# Patient Record
Sex: FEMALE | Race: BLACK OR AFRICAN AMERICAN | NOT HISPANIC OR LATINO | Employment: FULL TIME | URBAN - METROPOLITAN AREA
[De-identification: names, ages, dates, MRNs, and addresses within clinical notes are randomized per-mention and may not be internally consistent; named-entity substitution may affect disease eponyms.]

---

## 2017-02-11 ENCOUNTER — HOSPITAL ENCOUNTER (EMERGENCY)
Facility: HOSPITAL | Age: 22
Discharge: HOME | End: 2017-02-11
Attending: EMERGENCY MEDICINE
Payer: MEDICAID

## 2017-02-11 VITALS
DIASTOLIC BLOOD PRESSURE: 56 MMHG | SYSTOLIC BLOOD PRESSURE: 113 MMHG | HEIGHT: 60 IN | BODY MASS INDEX: 29.64 KG/M2 | RESPIRATION RATE: 20 BRPM | OXYGEN SATURATION: 98 % | HEART RATE: 67 BPM | TEMPERATURE: 98 F | WEIGHT: 150.94 LBS

## 2017-02-11 DIAGNOSIS — J06.9 VIRAL URI WITH COUGH: Primary | ICD-10-CM

## 2017-02-11 LAB
B-HCG UR QL: NEGATIVE
CTP QC/QA: YES
FLUAV AG SPEC QL IA: NEGATIVE
FLUBV AG SPEC QL IA: NEGATIVE
SPECIMEN SOURCE: NORMAL

## 2017-02-11 PROCEDURE — 81025 URINE PREGNANCY TEST: CPT | Performed by: PHYSICIAN ASSISTANT

## 2017-02-11 PROCEDURE — 87400 INFLUENZA A/B EACH AG IA: CPT

## 2017-02-11 PROCEDURE — 25000003 PHARM REV CODE 250: Performed by: PHYSICIAN ASSISTANT

## 2017-02-11 PROCEDURE — 99283 EMERGENCY DEPT VISIT LOW MDM: CPT | Mod: 25

## 2017-02-11 RX ORDER — FLUTICASONE PROPIONATE 50 MCG
1 SPRAY, SUSPENSION (ML) NASAL 2 TIMES DAILY PRN
Qty: 15 G | Refills: 0 | Status: SHIPPED | OUTPATIENT
Start: 2017-02-11 | End: 2017-07-19

## 2017-02-11 RX ORDER — IBUPROFEN 600 MG/1
600 TABLET ORAL
Status: COMPLETED | OUTPATIENT
Start: 2017-02-11 | End: 2017-02-11

## 2017-02-11 RX ORDER — IBUPROFEN 600 MG/1
600 TABLET ORAL EVERY 6 HOURS PRN
Qty: 20 TABLET | Refills: 0 | Status: SHIPPED | OUTPATIENT
Start: 2017-02-11 | End: 2017-04-02 | Stop reason: ALTCHOICE

## 2017-02-11 RX ADMIN — IBUPROFEN 600 MG: 600 TABLET, FILM COATED ORAL at 01:02

## 2017-02-11 NOTE — ED AVS SNAPSHOT
[facility]  [place] [address]               Jay Sahux   2017  1:04 PM   ED    Description:  Female : 1995   Department:  [facility]           Your Care was Coordinated By:     Provider Role From To    Kalyan Vitale MD Attending Provider [phone] --    Jass Guzman PA-C Physician Assistant [phone] --      Reason for Visit     Sore Throat     Generalized Body Aches           Diagnoses this Visit        Comments    Viral URI with cough    -  Primary       ED Disposition     None           To Do List           Follow-up Information     Go to [facility].    Specialty:  Emergency Medicine    Why:  If symptoms worsen    Contact information:    [place] [address] [phone]        Follow up with Suzy Shanks MD. Schedule an appointment as soon as possible for a visit in 1 week.    Specialty:  Family Medicine    Why:  For follow-up care    Contact information:    [address] [place] [address]  [phone]         These Medications        Disp Refills Start End    fluticasone (FLONASE) 50 mcg/actuation nasal spray 15 g 0 2017     1 spray by Each Nare route 2 (two) times daily as needed for Rhinitis. - Each Nare    Pharmacy: [facility] [address] [facility] [address] AT [facility] Ph #: [phone]       ibuprofen (ADVIL,MOTRIN) 600 MG tablet 20 tablet 0 2017     Take 1 tablet (600 mg total) by mouth every 6 (six) hours as needed for Pain. - Oral    Pharmacy: [facility] [address] [place], [place] - [address] AT [facility] Ph #: [phone]         [facility]     [facility] Care [facility] -  Assistance  Registered nurses in the [facility] provide clinical advisement, health education, appointment booking, and other advisory  services.  Call for this free service at [phone].             Medications           Message regarding Medications     Verify the changes and/or additions to your medication regime listed below are the same as discussed with your clinician today.  If any of these changes or additions are incorrect, please notify your healthcare provider.        START taking these NEW medications        Refills    fluticasone (FLONASE) 50 mcg/actuation nasal spray 0    Si spray by Each Nare route 2 (two) times daily as needed for Rhinitis.    Class: Print    Route: Each Nare    ibuprofen (ADVIL,MOTRIN) 600 MG tablet 0    Sig: Take 1 tablet (600 mg total) by mouth every 6 (six) hours as needed for Pain.    Class: Print    Route: Oral      These medications were administered today        Dose Freq    ibuprofen tablet 600 mg 600 mg ED 1 Time    Sig: Take 1 tablet (600 mg total) by mouth ED 1 Time.    Class: Normal    Route: Oral    Cosign for Ordering: Accepted by Kalyan Vitale MD on 2017  2:02 PM      STOP taking these medications     insulin detemir (LEVEMIR FLEXTOUCH) 100 unit/mL (3 mL) SubQ InPn pen Inject 18 Units into the skin 2 (two) times daily.           Verify that the below list of medications is an accurate representation of the medications you are currently taking.  If none reported, the list may be blank. If incorrect, please contact your healthcare provider. Carry this list with you in case of emergency.           Current Medications     blood sugar diagnostic Strp 1 strip by Misc.(Non-Drug; Combo Route) route 5 (five) times daily.    fluticasone (FLONASE) 50 mcg/actuation nasal spray 1 spray by Each Nare route 2 (two) times daily as needed for Rhinitis.    GLUCAGON EMERGENCY KIT, HUMAN, 1 mg injection     ibuprofen (ADVIL,MOTRIN) 600 MG tablet Take 1 tablet (600 mg total) by mouth every 6 (six) hours as needed for Pain.    insulin lispro (HUMALOG KWIKPEN) 100 unit/mL InPn pen Sliding scale insulin as  "directed    lancets Misc Pt test 6 times daily, fast click lancets    MIRENA 20 mcg/24 hr (5 years) IUD     pen needle, diabetic (BD INSULIN PEN NEEDLE UF MINI) 31 gauge x 3/16" Ndle 1 each by Misc.(Non-Drug; Combo Route) route 3 (three) times daily.           Clinical Reference Information           Your Vitals Were     BP Pulse Temp Resp Height Weight    113/60 (BP Location: Right arm, Patient Position: Sitting) 98 98.8 °F (37.1 °C) (Oral) 17 5' (1.524 m) 68.5 kg (150 lb 15 oz)    SpO2 BMI             97% 29.48 kg/m2         Allergies as of 2/11/2017        Reactions    Clindamycin Anaphylaxis, Hives      Immunizations Administered on Date of Encounter - 2/11/2017     None      ED Micro, Lab, POCT     Start Ordered       Status Ordering Provider    02/11/17 1317 02/11/17 1316  Influenza antigen Nasopharyngeal Swab  STAT      Final result     02/11/17 1317 02/11/17 1316  POCT urine pregnancy  Once      Final result       ED Imaging Orders     None        Discharge Instructions         Viral Upper Respiratory Illness (Adult)  You have a viral upper respiratory illness (URI), which is another term for the common cold. This illness is contagious during the first few days. It is spread through the air by coughing and sneezing. It may also be spread by direct contact (touching the sick person and then touching your own eyes, nose, or mouth). Frequent handwashing will decrease risk of spread. Most viral illnesses go away within 7 to 10 days with rest and simple home remedies. Sometimes the illness may last for several weeks. Antibiotics will not kill a virus, and they are generally not prescribed for this condition.    Home care  · If symptoms are severe, rest at home for the first 2 to 3 days. When you resume activity, don't let yourself get too tired.  · Avoid being exposed to cigarette smoke (yours or others).  · You may use acetaminophen or ibuprofen to control pain and fever, unless another medicine was prescribed. " (Note: If you have chronic liver or kidney disease, have ever had a stomach ulcer or gastrointestinal bleeding, or are taking blood-thinning medicines, talk with your healthcare provider before using these medicines.) Aspirin should never be given to anyone under 18 years of age who is ill with a viral infection or fever. It may cause severe liver or brain damage.  · Your appetite may be poor, so a light diet is fine. Avoid dehydration by drinking 6 to 8 glasses of fluids per day (water, soft drinks, juices, tea, or soup). Extra fluids will help loosen secretions in the nose and lungs.  · Over-the-counter cold medicines will not shorten the length of time youre sick, but they may be helpful for the following symptoms: cough, sore throat, and nasal and sinus congestion. (Note: Do not use decongestants if you have high blood pressure.)  Follow-up care  Follow up with your healthcare provider, or as advised.  When to seek medical advice  Call your healthcare provider right away if any of these occur:  · Cough with lots of colored sputum (mucus)  · Severe headache; face, neck, or ear pain  · Difficulty swallowing due to throat pain  · Fever of 100.4°F (38°C)  Call 911, or get immediate medical care  Call emergency services right away if any of these occur:  · Chest pain, shortness of breath, wheezing, or difficulty breathing  · Coughing up blood  · Inability to swallow due to throat pain  Date Last Reviewed: 9/13/2015  © 5865-0337 [employer]. [address] [place] [address]. All rights reserved. This information is not intended as a substitute for professional medical care. Always follow your healthcare professional's instructions.           [facility] complies with applicable Federal civil rights laws and does not discriminate on the basis of race, color, national origin, age, disability, or sex.        Language Assistance Services     ATTENTION: Language assistance services  are available, free of charge. Please call [phone].      ATENCIÓN: Si habla español, tiene a gray disposición servicios gratuitos de asistencia lingüística. Llame al [phone].     CHÚ Ý: N?u b?n nói Ti?ng Vi?t, có các d?ch v? h? tr? ngôn ng? mi?n phí dành cho b?n. G?i s? [phone].

## 2017-02-11 NOTE — DISCHARGE INSTRUCTIONS
Viral Upper Respiratory Illness (Adult)  You have a viral upper respiratory illness (URI), which is another term for the common cold. This illness is contagious during the first few days. It is spread through the air by coughing and sneezing. It may also be spread by direct contact (touching the sick person and then touching your own eyes, nose, or mouth). Frequent handwashing will decrease risk of spread. Most viral illnesses go away within 7 to 10 days with rest and simple home remedies. Sometimes the illness may last for several weeks. Antibiotics will not kill a virus, and they are generally not prescribed for this condition.    Home care  · If symptoms are severe, rest at home for the first 2 to 3 days. When you resume activity, don't let yourself get too tired.  · Avoid being exposed to cigarette smoke (yours or others).  · You may use acetaminophen or ibuprofen to control pain and fever, unless another medicine was prescribed. (Note: If you have chronic liver or kidney disease, have ever had a stomach ulcer or gastrointestinal bleeding, or are taking blood-thinning medicines, talk with your healthcare provider before using these medicines.) Aspirin should never be given to anyone under 18 years of age who is ill with a viral infection or fever. It may cause severe liver or brain damage.  · Your appetite may be poor, so a light diet is fine. Avoid dehydration by drinking 6 to 8 glasses of fluids per day (water, soft drinks, juices, tea, or soup). Extra fluids will help loosen secretions in the nose and lungs.  · Over-the-counter cold medicines will not shorten the length of time youre sick, but they may be helpful for the following symptoms: cough, sore throat, and nasal and sinus congestion. (Note: Do not use decongestants if you have high blood pressure.)  Follow-up care  Follow up with your healthcare provider, or as advised.  When to seek medical advice  Call your healthcare provider right away if any  of these occur:  · Cough with lots of colored sputum (mucus)  · Severe headache; face, neck, or ear pain  · Difficulty swallowing due to throat pain  · Fever of 100.4°F (38°C)  Call 911, or get immediate medical care  Call emergency services right away if any of these occur:  · Chest pain, shortness of breath, wheezing, or difficulty breathing  · Coughing up blood  · Inability to swallow due to throat pain  Date Last Reviewed: 9/13/2015  © 6505-1109 [employer] [address] [place] [address]. All rights reserved. This information is not intended as a substitute for professional medical care. Always follow your healthcare professional's instructions.

## 2017-02-11 NOTE — ED PROVIDER NOTES
"Encounter Date: 2/11/2017    SCRIBE #1 NOTE: I, Jenelel Cevallos, am scribing for, and in the presence of,  Jass Guzman PA-C. I have scribed the following portions of the note - Other sections scribed: HPI/ROS.       History     Chief Complaint   Patient presents with    Sore Throat     Pt. woke up today with "body aches and my body feelin wierd. I have a sore throat and nose stopped up".     Generalized Body Aches     Review of patient's allergies indicates:   Allergen Reactions    Clindamycin Anaphylaxis and Hives     HPI Comments: CC: Sore Throat    HPI: This 22 yo F who has history of heart murmur, sickle cell trait, and DM presents to the ED for evaluation of acute onset mild sore throat with associated mild nasal congestion, intermittent mild cough, decreased appetite, and generalized body aches for two days. No treatment attempted. Close contact with mother who was recently diagnosed with the flu. No flu shot this season. The pt denies fever, chills, N/V/D, and any other associated symptoms.     The history is provided by the patient. No  was used.     Past Medical History   Diagnosis Date    Diabetes mellitus type I     Heart murmur     Sickle cell trait      No past medical history pertinent negatives.  Past Surgical History   Procedure Laterality Date    Cyst removed on buttox  12/2011    Pilonidal cyst drainage  2014    Dilation and curettage of uterus  05/17/13     Family History   Problem Relation Age of Onset    Diabetes Paternal Grandmother     Hypertension Paternal Grandmother     Asthma Brother     Thyroid disease Mother      Social History   Substance Use Topics    Smoking status: Never Smoker    Smokeless tobacco: Never Used    Alcohol use No     Review of Systems   Constitutional: Positive for appetite change (decreased). Negative for chills and fever.   HENT: Positive for congestion (nasal) and sore throat.    Eyes: Negative for visual disturbance. "   Respiratory: Positive for cough (mild, intermittent). Negative for shortness of breath.    Cardiovascular: Negative for chest pain.   Gastrointestinal: Negative for abdominal pain, diarrhea, nausea and vomiting.   Genitourinary: Negative for dysuria and frequency.   Musculoskeletal: Positive for myalgias.   Skin: Negative for rash.   Neurological: Negative for headaches.       Physical Exam   Initial Vitals   BP Pulse Resp Temp SpO2   02/11/17 1256 02/11/17 1256 02/11/17 1256 02/11/17 1256 02/11/17 1256   113/60 98 17 98.8 °F (37.1 °C) 97 %     Physical Exam    Vitals reviewed.  Constitutional: She appears well-developed and well-nourished. She is not diaphoretic. No distress.   HENT:   Head: Normocephalic and atraumatic.   Right Ear: External ear normal.   Left Ear: External ear normal.   Nose: Nose normal.   Mouth/Throat: Oropharynx is clear and moist. No oropharyngeal exudate.   Uvula midline, mild maxillary sinus tenderness.   Eyes: Conjunctivae are normal. No scleral icterus.   Neck: Normal range of motion. Neck supple. No JVD present.   Cardiovascular: Normal rate, regular rhythm, normal heart sounds and intact distal pulses.   Pulmonary/Chest: Breath sounds normal. No stridor. No respiratory distress. She has no wheezes. She has no rhonchi. She has no rales. She exhibits no tenderness.   Musculoskeletal: Normal range of motion. She exhibits no edema or tenderness.   Lymphadenopathy:     She has no cervical adenopathy.   Neurological: She is alert and oriented to person, place, and time.   Skin: Skin is warm and dry. No rash noted.         ED Course   Procedures  Labs Reviewed   INFLUENZA A AND B ANTIGEN   POCT URINE PREGNANCY             Medical Decision Making:   History:   Old Medical Records: I decided to obtain old medical records.    Emergent evaluation of a 21-year-old female with diabetes complaining of myalgias, sinus congestion, sore throat, cough ×1 day.  She denies dysuria, fever, shortness of  breath, and has been exposed to influenza recently.   She presents well-appearing in no distress, afebrile with normal vital signs.  She has mild sinus tenderness but otherwise HEENT exam is unremarkable.  I doubt strep pharyngitis, peritonsillar abscess, posterior pharyngeal abscess, epiglottitis.  Lungs sounds are clear with normal work of breathing.  Heart sounds are normal.  I doubt pneumonia, endocarditis, or bronchitis.  Influenza nasopharyngeal swab is negative.  Patient likely has a non-influenza viral URI with cough and will be treated supportively Flonase and ibuprofen.  Patient discharged with return precautions and advised follow-up with PCP.  Case discussed with Dr. Vitale.          Scribe Attestation:   Scribe #1: I performed the above scribed service and the documentation accurately describes the services I performed. I attest to the accuracy of the note.    Attending Attestation:           Physician Attestation for Scribe:  Physician Attestation Statement for Scribe #1: I, Jass Guzman PA-C, reviewed documentation, as scribed by Jenelle Cevallos in my presence, and it is both accurate and complete.                 ED Course     Clinical Impression:   The encounter diagnosis was Viral URI with cough.          Jass Guzman PA-C  02/11/17 3299

## 2017-02-20 ENCOUNTER — TELEPHONE (OUTPATIENT)
Dept: OBSTETRICS AND GYNECOLOGY | Facility: CLINIC | Age: 22
End: 2017-02-20

## 2017-02-20 NOTE — TELEPHONE ENCOUNTER
----- Message from Ana Cristina Daugherty sent at 2/20/2017 11:03 AM CST -----  Contact: [phone]  Pt is having problems with her birth control and wants to speak with a nurse  Please call pt at your earliest convenience.  Thanks !

## 2017-02-20 NOTE — TELEPHONE ENCOUNTER
Spoke with pt. Pt c/o of pelvic pain after having sex. Pt has IUD and is not sure if that is why she is having the pain. Pt informed that it is not unusual to have pelvic pain or cramping after sex due to IUD. Pt advised to try taking OTC tylenol or IBU and see if it helps relieve the pain. Pt advised to call our office for an evaluation if that does not help.

## 2017-03-13 PROBLEM — E78.00 HYPERCHOLESTEROLEMIA: Status: ACTIVE | Noted: 2017-03-13

## 2017-03-13 PROBLEM — E78.5 DYSLIPIDEMIA: Status: ACTIVE | Noted: 2017-03-13

## 2017-04-02 ENCOUNTER — HOSPITAL ENCOUNTER (EMERGENCY)
Facility: HOSPITAL | Age: 22
Discharge: HOME | End: 2017-04-02
Attending: EMERGENCY MEDICINE
Payer: MEDICAID

## 2017-04-02 VITALS
BODY MASS INDEX: 29.45 KG/M2 | OXYGEN SATURATION: 99 % | HEART RATE: 79 BPM | TEMPERATURE: 99 F | HEIGHT: 60 IN | DIASTOLIC BLOOD PRESSURE: 60 MMHG | WEIGHT: 150 LBS | RESPIRATION RATE: 17 BRPM | SYSTOLIC BLOOD PRESSURE: 106 MMHG

## 2017-04-02 DIAGNOSIS — M79.641 BILATERAL HAND PAIN: Primary | ICD-10-CM

## 2017-04-02 DIAGNOSIS — M79.642 BILATERAL HAND PAIN: Primary | ICD-10-CM

## 2017-04-02 DIAGNOSIS — E10.8 TYPE 1 DIABETES MELLITUS WITH COMPLICATION: ICD-10-CM

## 2017-04-02 LAB
B-HCG UR QL: NEGATIVE
CTP QC/QA: YES

## 2017-04-02 PROCEDURE — 81025 URINE PREGNANCY TEST: CPT | Performed by: NURSE PRACTITIONER

## 2017-04-02 PROCEDURE — 99283 EMERGENCY DEPT VISIT LOW MDM: CPT | Mod: 25

## 2017-04-02 PROCEDURE — 25000003 PHARM REV CODE 250: Performed by: NURSE PRACTITIONER

## 2017-04-02 RX ORDER — NAPROXEN 500 MG/1
500 TABLET ORAL 2 TIMES DAILY PRN
Qty: 10 TABLET | Refills: 0 | Status: SHIPPED | OUTPATIENT
Start: 2017-04-02 | End: 2017-04-07

## 2017-04-02 RX ORDER — NAPROXEN 500 MG/1
500 TABLET ORAL
Status: COMPLETED | OUTPATIENT
Start: 2017-04-02 | End: 2017-04-02

## 2017-04-02 RX ADMIN — NAPROXEN 500 MG: 500 TABLET ORAL at 06:04

## 2017-04-02 NOTE — ED PROVIDER NOTES
"Encounter Date: 4/2/2017    SCRIBE #1 NOTE: I, Willam Faith , am scribing for, and in the presence of,  VINCENT Fowler. I have scribed the following portions of the note - Other sections scribed: HPI, ROS .       History     Chief Complaint   Patient presents with    Hand Pain     bilat, worse in the right +stiffness, works as a      Review of patient's allergies indicates:   Allergen Reactions    Clindamycin Anaphylaxis and Hives     HPI Comments: CC: Hand Pain     HPI: This 21 y.o. female with a medical history of Diabetes mellitus type I; Heart murmur; and Sickle cell trait presents to the ED accompanied by spouse c/o intermittent bilateral palmar and dorsal hand pain that begins 1 day after work everytime. Pt reports moderate "aching" pain (5/10) that is exacerbated with opening a fist. Pt reports working as a . Pt states hand pain was not present while working as a . Pt reports history of similar hand pain that was treated with Gabapentin, with relief. Pt states recent PCP change and recent new work as  resulting in no Gabapentin treatment for hand pain history for weeks. Pt states compliance with DM treatment and CBG levels are normally around 100. Pt denies wrist pain, finger pain, elbow pain, fever, chills, nausea, vomiting, or any other associated symptoms. Pt has no modifying factors. Pt has no prior treatment.     The history is provided by the patient. No  was used.     Past Medical History:   Diagnosis Date    Diabetes mellitus type I     Heart murmur     Sickle cell trait      Past Surgical History:   Procedure Laterality Date    cyst removed on buttox  12/2011    DILATION AND CURETTAGE OF UTERUS  05/17/13    PILONIDAL CYST DRAINAGE  2014     Family History   Problem Relation Age of Onset    Diabetes Paternal Grandmother     Hypertension Paternal Grandmother     Asthma Brother     Thyroid disease Mother      Social History "   Substance Use Topics    Smoking status: Never Smoker    Smokeless tobacco: Never Used    Alcohol use No     Review of Systems   Constitutional: Negative for chills and fever.   HENT: Negative for sore throat.    Eyes: Negative for pain.   Respiratory: Negative for shortness of breath.    Cardiovascular: Negative for chest pain.   Gastrointestinal: Negative for nausea and vomiting.   Genitourinary: Negative for dysuria.   Musculoskeletal: Positive for arthralgias (bilateral palmar and dorsal hand ). Negative for back pain and neck pain.        (-) bilateral elbow   (-) bilateral wrist   (-) any finger pain    Skin: Negative for rash and wound.   Neurological: Negative for headaches.   Psychiatric/Behavioral: Negative for confusion.       Physical Exam   Initial Vitals   BP Pulse Resp Temp SpO2   04/02/17 1732 04/02/17 1732 04/02/17 1732 04/02/17 1732 04/02/17 1732   106/60 79 17 99.2 °F (37.3 °C) 99 %     Physical Exam    Nursing note and vitals reviewed.  Constitutional: She appears well-developed and well-nourished. She is not diaphoretic. She is cooperative.  Non-toxic appearance. No distress.   HENT:   Head: Normocephalic and atraumatic.   Right Ear: External ear normal.   Left Ear: External ear normal.   Eyes: Conjunctivae and EOM are normal.   Neck: Normal range of motion. No tracheal deviation present.   Cardiovascular: Normal rate, regular rhythm and intact distal pulses.   Pulses:       Radial pulses are 2+ on the right side, and 2+ on the left side.   Pulmonary/Chest: Effort normal. No stridor. No tachypnea and no bradypnea.   Musculoskeletal:        Right wrist: Normal.        Left wrist: Normal.        Right hand: She exhibits normal range of motion, no bony tenderness, normal two-point discrimination, normal capillary refill, no laceration and no swelling. Normal sensation noted. Normal strength noted.        Left hand: She exhibits normal range of motion, no bony tenderness, normal two-point  discrimination, normal capillary refill, no laceration and no swelling. Normal sensation noted. Normal strength noted.        Hands:  No focal tenderness palpation over the dorsal or palmar surfaces of the hand.  She has full range of motion of all fingers without pain.  She has +2 radial pulses bilaterally.  No increased warmth, open wounds, lacerations, or drainage present.   Neurological: She is alert and oriented to person, place, and time. She has normal strength. No sensory deficit. Gait normal. GCS eye subscore is 4. GCS verbal subscore is 5. GCS motor subscore is 6.   Skin: Skin is warm, dry and intact. No rash noted. No cyanosis or erythema. Nails show no clubbing.   Psychiatric: She has a normal mood and affect. Her behavior is normal. Judgment and thought content normal.         ED Course   Procedures  Labs Reviewed   POCT URINE PREGNANCY   POCT GLUCOSE MONITORING CONTINUOUS                   APC / Resident Notes:   This is an evaluation a 21-year-old female presents emergency department for a chronic history of bilateral aching hand pain that she reports is worse after physical activity including work.  She currently works as a  doing lots of repetitive activities with her hand. Physical Exam shows a non-toxic, afebrile, and well appearing female.  There is no focal tenderness palpation over the dorsal or palmar surface of the hand.  No bruising, erythema, edema, open wounds, or increased warmth noted.  Ocean and bilateral wrist without painful range of motion of all fingers without pain she reports the aching sensation is worse after movement and located between the MCP joints and the wrist on bilateral hands.  Forearm compartments are soft.  She has experiences pain previously which she was on gabapentin for short time which did help some of the symptoms however she had to stop that due to the side effects.  She does have type 1 diabetes and has been told she may have diabetic neuropathy.  Vital Signs Are Reassuring.  RESULTS: UPT negative.  Blood sugars 206mg/dL.    My overall impression is hand pain - possibly complication of diabetic neuropathy. I considered, but at this time, do not suspect fracture, dislocation, septic joint, cellulitis, compartment syndrome.    ED Course: Naproxen. D/C Meds: Naproxen. The diagnosis, treatment plan, instructions for follow-up and reevaluation with her PCP as well as ED return precautions were discussed and understanding was verbalized. All questions or concerns have been addressed.  She does report the gabapentin helped her previously however she did stop it because of side effects, given this I will defer treatment with gabapentin to a primary care doctor.  This case was discussed with Dr. Lopes who is in agreement with my assessment and plan. ADELAIDE Valentine, VINCENT-C        Scribe Attestation:   Scribe #1: I performed the above scribed service and the documentation accurately describes the services I performed. I attest to the accuracy of the note.    Attending Attestation:           Physician Attestation for Scribe:  Physician Attestation Statement for Scribe #1: I, VINCENT Fowler, reviewed documentation, as scribed by Willam Faith  in my presence, and it is both accurate and complete.                 ED Course     Clinical Impression:   The primary encounter diagnosis was Bilateral hand pain. A diagnosis of Type 1 diabetes mellitus with complication was also pertinent to this visit.    Disposition:   Disposition: Discharged  Condition: Stable       VINCENT Chiu  04/02/17 1466

## 2017-04-02 NOTE — DISCHARGE INSTRUCTIONS
Please return to the Emergency Department for any new or worsening symptoms including: worsening hand pain, fever, chest pain, shortness of breath, loss of consciousness, dizziness, weakness, or any other concerns.     Please follow up with your Primary Care Provider within in the week. If you do not have a Primary Care Provider, you may contact the one listed on your discharge paperwork or you may also call the [facility] Appointment Desk at [phone] to schedule an appointment with a Primary Care Provider.     Please take all medication as prescribed. You have been prescribed Naproxen for pain. This is an Non-Steroidal Anti-Inflammatory (NSAID) Medication. Please do not take any additional NSAIDs while you are taking this medication including (Advil, Aleve, Motrin, Ibuprofen, Mobic\meloxicam, Naprosyn, etc.). Please stop taking this medication if you experience: weakness, itching, yellow skin or eyes, joint pains, vomiting blood, blood or black stools, unusual weight gain, or swelling in your arms, legs, hands, or feet.

## 2017-04-02 NOTE — ED AVS SNAPSHOT
[facility]  [place] [address]               Jay Sahux   2017  5:38 PM   ED    Description:  Female : 1995   Department:  [facility]           Your Care was Coordinated By:     Provider Role From To    Vicky Lopes MD Attending Provider [phone] --    VINCENT Chiu Nurse Practitioner [phone] --      Reason for Visit     Hand Pain           Diagnoses this Visit        Comments    Bilateral hand pain    -  Primary     Type 1 diabetes mellitus with complication           ED Disposition     ED Disposition Condition Comment    Discharge             To Do List           Follow-up Information     Schedule an appointment as soon as possible for a visit with Suzy Shanks MD.    Specialty:  Family Medicine    Why:  This Week, For Follow-Up    Contact information:    [place] [address]  [phone]          Go to [facility].    Specialty:  Emergency Medicine    Why:  If symptoms worsen    Contact information:    [place] [id number] [phone]       These Medications        Disp Refills Start End    naproxen (NAPROSYN) 500 MG tablet 10 tablet 0 2017    Take 1 tablet (500 mg total) by mouth 2 (two) times daily as needed (Pain). Take With Meals - Oral    Pharmacy: [facility] [address] [place] [address] [address] AT [facility] Ph #: [phone]         [facility]     [facility] Care [facility] -  Assistance  Unless otherwise directed by your provider, please contact [facility], our nurse care line that is available for  assistance.     Registered nurses in the [facility] provide: appointment scheduling, clinical advisement, health education, and other advisory services.  Call: [phone] (toll free)               Medications           Message regarding  Medications     Verify the changes and/or additions to your medication regime listed below are the same as discussed with your clinician today.  If any of these changes or additions are incorrect, please notify your healthcare provider.        START taking these NEW medications        Refills    naproxen (NAPROSYN) 500 MG tablet 0    Sig: Take 1 tablet (500 mg total) by mouth 2 (two) times daily as needed (Pain). Take With Meals    Class: Print    Route: Oral      These medications were administered today        Dose Freq    naproxen tablet 500 mg 500 mg ED 1 Time    Sig: Take 1 tablet (500 mg total) by mouth ED 1 Time.    Class: Normal    Route: Oral      STOP taking these medications     ibuprofen (ADVIL,MOTRIN) 600 MG tablet Take 1 tablet (600 mg total) by mouth every 6 (six) hours as needed for Pain.           Verify that the below list of medications is an accurate representation of the medications you are currently taking.  If none reported, the list may be blank. If incorrect, please contact your healthcare provider. Carry this list with you in case of emergency.           Current Medications     insulin lispro (HUMALOG KWIKPEN) 100 unit/mL InPn pen 4 units TID AC plus Sliding scale insulin as directed    LANTUS SOLOSTAR 100 unit/mL (3 mL) InPn pen Inject 20 Units into the skin 2 (two) times daily.    blood sugar diagnostic Strp 1 strip by Misc.(Non-Drug; Combo Route) route 5 (five) times daily.    fluticasone (FLONASE) 50 mcg/actuation nasal spray 1 spray by Each Nare route 2 (two) times daily as needed for Rhinitis.    GLUCAGON EMERGENCY KIT, HUMAN, 1 mg injection     lancets Misc Pt test 6 times daily, fast click lancets    MIRENA 20 mcg/24 hr (5 years) IUD     naproxen (NAPROSYN) 500 MG tablet Take 1 tablet (500 mg total) by mouth 2 (two) times daily as needed (Pain). Take With Meals    naproxen tablet 500 mg Take 1 tablet (500 mg total) by mouth ED 1 Time.    pen needle, diabetic (BD INSULIN PEN NEEDLE  "UF MINI) 31 gauge x 3/16" Ndle 1 each by Misc.(Non-Drug; Combo Route) route 3 (three) times daily.           Clinical Reference Information           Your Vitals Were     BP Pulse Temp Resp Height Weight    106/60 (BP Location: Right arm, Patient Position: Sitting) 79 99.2 °F (37.3 °C) (Oral) 17 5' (1.524 m) 68 kg (150 lb)    SpO2 BMI             99% 29.29 kg/m2         Allergies as of 4/2/2017        Reactions    Clindamycin Anaphylaxis, Hives      Immunizations Administered on Date of Encounter - 4/2/2017     None      ED Micro, Lab, POCT     Start Ordered       Status Ordering Provider    04/02/17 1757 04/02/17 1756  POCT glucose  Once      Ordered     04/02/17 1757 04/02/17 1756  POCT urine pregnancy  Once      Final result       ED Imaging Orders     None        Discharge Instructions       Please return to the Emergency Department for any new or worsening symptoms including: worsening hand pain, fever, chest pain, shortness of breath, loss of consciousness, dizziness, weakness, or any other concerns.     Please follow up with your Primary Care Provider within in the week. If you do not have a Primary Care Provider, you may contact the one listed on your discharge paperwork or you may also call the [facility] Appointment Desk at [phone] to schedule an appointment with a Primary Care Provider.     Please take all medication as prescribed. You have been prescribed Naproxen for pain. This is an Non-Steroidal Anti-Inflammatory (NSAID) Medication. Please do not take any additional NSAIDs while you are taking this medication including (Advil, Aleve, Motrin, Ibuprofen, Mobic\meloxicam, Naprosyn, etc.). Please stop taking this medication if you experience: weakness, itching, yellow skin or eyes, joint pains, vomiting blood, blood or black stools, unusual weight gain, or swelling in your arms, legs, hands, or feet.     Discharge References/Attachments     MYALGIAS (ENGLISH)      Your Scheduled Appointments  "    Apr 04, 2017 10:15 AM CDT   Established Patient with MD Dr. [facility]    [address] [facility] [address]   [phone]               [facility] complies with applicable Federal civil rights laws and does not discriminate on the basis of race, color, national origin, age, disability, or sex.        Language Assistance Services     ATTENTION: Language assistance services are available, free of charge. Please call [phone].      ATENCIÓN: Si habla español, tiene a gray disposición servicios gratuitos de asistencia lingüística. Llame al [phone].     CHÚ Ý: N?u b?n nói Ti?ng Vi?t, có các d?ch v? h? tr? ngôn ng? mi?n phí dành cho b?n. G?i s? [phone].

## 2017-04-05 LAB — POCT GLUCOSE: 206 MG/DL (ref 70–110)

## 2017-05-13 ENCOUNTER — HOSPITAL ENCOUNTER (INPATIENT)
Facility: HOSPITAL | Age: 22
LOS: 2 days | Discharge: HOME | DRG: 392 | End: 2017-05-15
Attending: EMERGENCY MEDICINE | Admitting: EMERGENCY MEDICINE
Payer: MEDICAID

## 2017-05-13 DIAGNOSIS — D72.829 LEUKOCYTOSIS, UNSPECIFIED TYPE: ICD-10-CM

## 2017-05-13 DIAGNOSIS — B96.89 BV (BACTERIAL VAGINOSIS): ICD-10-CM

## 2017-05-13 DIAGNOSIS — K52.9 ENTEROCOLITIS: ICD-10-CM

## 2017-05-13 DIAGNOSIS — R10.9 ABDOMINAL PAIN, UNSPECIFIED LOCATION: ICD-10-CM

## 2017-05-13 DIAGNOSIS — N76.0 BV (BACTERIAL VAGINOSIS): ICD-10-CM

## 2017-05-13 DIAGNOSIS — R50.9 ACUTE FEBRILE ILLNESS: Primary | ICD-10-CM

## 2017-05-13 DIAGNOSIS — E10.9 TYPE 1 DIABETES MELLITUS NOT AT GOAL: ICD-10-CM

## 2017-05-13 DIAGNOSIS — E10.8 TYPE 1 DIABETES MELLITUS WITH COMPLICATION: ICD-10-CM

## 2017-05-13 LAB
ALBUMIN SERPL BCP-MCNC: 3.2 G/DL
ALP SERPL-CCNC: 91 U/L
ALT SERPL W/O P-5'-P-CCNC: 11 U/L
ANION GAP SERPL CALC-SCNC: 11 MMOL/L
AST SERPL-CCNC: 14 U/L
B-HCG UR QL: NEGATIVE
BACTERIA #/AREA URNS HPF: NORMAL /HPF
BACTERIA GENITAL QL WET PREP: ABNORMAL
BASOPHILS # BLD AUTO: 0.02 K/UL
BASOPHILS NFR BLD: 0.1 %
BILIRUB SERPL-MCNC: 0.4 MG/DL
BILIRUB UR QL STRIP: NEGATIVE
BUN SERPL-MCNC: 5 MG/DL
CALCIUM SERPL-MCNC: 9.4 MG/DL
CHLORIDE SERPL-SCNC: 103 MMOL/L
CLARITY UR: CLEAR
CLUE CELLS VAG QL WET PREP: ABNORMAL
CO2 SERPL-SCNC: 21 MMOL/L
COLOR UR: YELLOW
CREAT SERPL-MCNC: 0.9 MG/DL
CTP QC/QA: YES
DIFFERENTIAL METHOD: ABNORMAL
EOSINOPHIL # BLD AUTO: 0.1 K/UL
EOSINOPHIL NFR BLD: 0.3 %
ERYTHROCYTE [DISTWIDTH] IN BLOOD BY AUTOMATED COUNT: 12.4 %
EST. GFR  (AFRICAN AMERICAN): >60 ML/MIN/1.73 M^2
EST. GFR  (NON AFRICAN AMERICAN): >60 ML/MIN/1.73 M^2
FILAMENT FUNGI VAG WET PREP-#/AREA: ABNORMAL
GLUCOSE SERPL-MCNC: 144 MG/DL
GLUCOSE UR QL STRIP: NEGATIVE
HCT VFR BLD AUTO: 38 %
HGB BLD-MCNC: 13.5 G/DL
HGB UR QL STRIP: NEGATIVE
KETONES UR QL STRIP: NEGATIVE
LEUKOCYTE ESTERASE UR QL STRIP: ABNORMAL
LIPASE SERPL-CCNC: 6 U/L
LYMPHOCYTES # BLD AUTO: 0.8 K/UL
LYMPHOCYTES NFR BLD: 4.2 %
MCH RBC QN AUTO: 28.7 PG
MCHC RBC AUTO-ENTMCNC: 35.5 %
MCV RBC AUTO: 81 FL
MICROSCOPIC COMMENT: NORMAL
MONOCYTES # BLD AUTO: 0.6 K/UL
MONOCYTES NFR BLD: 3.3 %
NEUTROPHILS # BLD AUTO: 17.2 K/UL
NEUTROPHILS NFR BLD: 92.1 %
NITRITE UR QL STRIP: NEGATIVE
PH UR STRIP: 6 [PH] (ref 5–8)
PLATELET # BLD AUTO: 354 K/UL
PMV BLD AUTO: 9.3 FL
POCT GLUCOSE: 152 MG/DL (ref 70–110)
POCT GLUCOSE: 162 MG/DL (ref 70–110)
POTASSIUM SERPL-SCNC: 3.9 MMOL/L
PROT SERPL-MCNC: 7.6 G/DL
PROT UR QL STRIP: NEGATIVE
RBC # BLD AUTO: 4.71 M/UL
SODIUM SERPL-SCNC: 135 MMOL/L
SP GR UR STRIP: 1.01 (ref 1–1.03)
SPECIMEN SOURCE: ABNORMAL
SQUAMOUS #/AREA URNS HPF: 3 /HPF
T VAGINALIS GENITAL QL WET PREP: ABNORMAL
URN SPEC COLLECT METH UR: ABNORMAL
UROBILINOGEN UR STRIP-ACNC: NEGATIVE EU/DL
WBC # BLD AUTO: 18.73 K/UL
WBC #/AREA URNS HPF: 4 /HPF (ref 0–5)
WBC #/AREA VAG WET PREP: ABNORMAL
YEAST GENITAL QL WET PREP: ABNORMAL

## 2017-05-13 PROCEDURE — 85025 COMPLETE CBC W/AUTO DIFF WBC: CPT

## 2017-05-13 PROCEDURE — 96375 TX/PRO/DX INJ NEW DRUG ADDON: CPT

## 2017-05-13 PROCEDURE — 96365 THER/PROPH/DIAG IV INF INIT: CPT

## 2017-05-13 PROCEDURE — 12000002 HC ACUTE/MED SURGE SEMI-PRIVATE ROOM

## 2017-05-13 PROCEDURE — 80053 COMPREHEN METABOLIC PANEL: CPT

## 2017-05-13 PROCEDURE — 63600175 PHARM REV CODE 636 W HCPCS: Performed by: NURSE PRACTITIONER

## 2017-05-13 PROCEDURE — 83690 ASSAY OF LIPASE: CPT

## 2017-05-13 PROCEDURE — 87591 N.GONORRHOEAE DNA AMP PROB: CPT

## 2017-05-13 PROCEDURE — 25500020 PHARM REV CODE 255: Performed by: EMERGENCY MEDICINE

## 2017-05-13 PROCEDURE — 87210 SMEAR WET MOUNT SALINE/INK: CPT

## 2017-05-13 PROCEDURE — 81025 URINE PREGNANCY TEST: CPT | Performed by: EMERGENCY MEDICINE

## 2017-05-13 PROCEDURE — 99285 EMERGENCY DEPT VISIT HI MDM: CPT | Mod: 59

## 2017-05-13 PROCEDURE — 87040 BLOOD CULTURE FOR BACTERIA: CPT

## 2017-05-13 PROCEDURE — 96372 THER/PROPH/DIAG INJ SC/IM: CPT

## 2017-05-13 PROCEDURE — 96367 TX/PROPH/DG ADDL SEQ IV INF: CPT

## 2017-05-13 PROCEDURE — 87086 URINE CULTURE/COLONY COUNT: CPT

## 2017-05-13 PROCEDURE — 96361 HYDRATE IV INFUSION ADD-ON: CPT

## 2017-05-13 PROCEDURE — 81000 URINALYSIS NONAUTO W/SCOPE: CPT

## 2017-05-13 PROCEDURE — 82962 GLUCOSE BLOOD TEST: CPT

## 2017-05-13 PROCEDURE — 25000003 PHARM REV CODE 250: Performed by: NURSE PRACTITIONER

## 2017-05-13 RX ORDER — DICYCLOMINE HYDROCHLORIDE 10 MG/ML
20 INJECTION INTRAMUSCULAR
Status: COMPLETED | OUTPATIENT
Start: 2017-05-13 | End: 2017-05-13

## 2017-05-13 RX ORDER — ACETAMINOPHEN 500 MG
500 TABLET ORAL
Status: COMPLETED | OUTPATIENT
Start: 2017-05-13 | End: 2017-05-13

## 2017-05-13 RX ORDER — GLUCAGON 1 MG
1 KIT INJECTION
Status: DISCONTINUED | OUTPATIENT
Start: 2017-05-13 | End: 2017-05-15 | Stop reason: HOSPADM

## 2017-05-13 RX ORDER — ONDANSETRON 2 MG/ML
4 INJECTION INTRAMUSCULAR; INTRAVENOUS
Status: DISCONTINUED | OUTPATIENT
Start: 2017-05-13 | End: 2017-05-13

## 2017-05-13 RX ORDER — INSULIN ASPART 100 [IU]/ML
0-5 INJECTION, SOLUTION INTRAVENOUS; SUBCUTANEOUS EVERY 6 HOURS PRN
Status: DISCONTINUED | OUTPATIENT
Start: 2017-05-13 | End: 2017-05-14

## 2017-05-13 RX ORDER — ONDANSETRON 2 MG/ML
4 INJECTION INTRAMUSCULAR; INTRAVENOUS
Status: COMPLETED | OUTPATIENT
Start: 2017-05-13 | End: 2017-05-13

## 2017-05-13 RX ADMIN — SODIUM CHLORIDE 1000 ML: 0.9 INJECTION, SOLUTION INTRAVENOUS at 08:05

## 2017-05-13 RX ADMIN — DICYCLOMINE HYDROCHLORIDE 20 MG: 10 INJECTION INTRAMUSCULAR at 08:05

## 2017-05-13 RX ADMIN — ACETAMINOPHEN 500 MG: 500 TABLET ORAL at 09:05

## 2017-05-13 RX ADMIN — IOHEXOL 75 ML: 350 INJECTION, SOLUTION INTRAVENOUS at 09:05

## 2017-05-13 RX ADMIN — ONDANSETRON 4 MG: 2 INJECTION INTRAMUSCULAR; INTRAVENOUS at 09:05

## 2017-05-13 NOTE — IP AVS SNAPSHOT
[place] [address] [place] [address]  Phone: [phone]           Patient Discharge Instructions   Our goal is to set you up for success. This packet includes information on your condition, medications, and your home care.  It will help you care for yourself to prevent having to return to the hospital.     Please ask your nurse if you have any questions.      There are many details to remember when preparing to leave the hospital. Here is what you will need to do:    1. Take your medicine. If you are prescribed medications, review your Medication List on the following pages. You may have new medications to  at the pharmacy and others that you'll need to stop taking. Review the instructions for how and when to take your medications. Talk with your doctor or nurses if you are unsure of what to do.     2. Go to your follow-up appointments. Specific follow-up information is listed in the following pages. Your may be contacted by a nurse or clinical provider about future appointments. Be sure we have all of the phone numbers to reach you. Please contact your provider's office if you are unable to make an appointment.     3. Watch for warning signs. Your doctor or nurse will give you detailed warning signs to watch for and when to call for assistance. These instructions may also include educational information about your condition. If you experience any of warning signs to your health, call your doctor.               ** Verify the list of medication(s) below is accurate and up to date. Carry this with you in case of emergency. If your medications have changed, please notify your healthcare provider.             Medication List      START taking these medications        Additional Info                      ciprofloxacin HCl 500 MG tablet   Commonly known as:  CIPRO   Quantity:  20 tablet   Refills:  0   Dose:  500 mg    Instructions:  Take 1 tablet (500 mg total) by mouth 2  (two) times daily.     Begin Date    AM    Noon    PM    Bedtime       metronidazole 500 MG tablet   Commonly known as:  FLAGYL   Quantity:  42 tablet   Refills:  0   Dose:  500 mg    Instructions:  Take 1 tablet (500 mg total) by mouth 3 (three) times daily.     Begin Date    AM    Noon    PM    Bedtime         CONTINUE taking these medications        Additional Info                      blood sugar diagnostic Strp   Quantity:  200 each   Refills:  6   Dose:  1 strip    Instructions:  1 strip by Misc.(Non-Drug; Combo Route) route 5 (five) times daily.     Begin Date    AM    Noon    PM    Bedtime       fluticasone 50 mcg/actuation nasal spray   Commonly known as:  FLONASE   Quantity:  15 g   Refills:  0   Dose:  1 spray    Instructions:  1 spray by Each Nare route 2 (two) times daily as needed for Rhinitis.     Begin Date    AM    Noon    PM    Bedtime       gabapentin 300 MG capsule   Commonly known as:  NEURONTIN   Refills:  0   Dose:  300 mg    Instructions:  Take 300 mg by mouth daily as needed.     Begin Date    AM    Noon    PM    Bedtime       GLUCAGON EMERGENCY KIT (HUMAN) 1 mg Kit   Refills:  1   Generic drug:  glucagon (human recombinant)      Begin Date    AM    Noon    PM    Bedtime       insulin glargine (TOUJEO) 300 unit/mL (1.5 mL) Inpn pen   Commonly known as:  TOUJEO SOLOSTAR   Quantity:  5 Syringe   Refills:  6   Dose:  40 Units    Instructions:  Inject 40 Units into the skin every evening.     Begin Date    AM    Noon    PM    Bedtime       insulin lispro 100 unit/mL Inpn pen   Commonly known as:  HUMALOG KWIKPEN   Quantity:  1 Box   Refills:  1    Instructions:  4 units TID AC plus Sliding scale insulin as directed     Begin Date    AM    Noon    PM    Bedtime       lancets Misc   Quantity:  200 each   Refills:  11    Instructions:  Pt test 6 times daily, fast click lancets     Begin Date    AM    Noon    PM    Bedtime       MIRENA 20 mcg/24 hr (5 years) IUD   Refills:  0   Generic drug:   "levonorgestrel      Begin Date    AM    Noon    PM    Bedtime       pen needle, diabetic 31 gauge x 3/16" Ndle   Commonly known as:  BD INSULIN PEN NEEDLE UF MINI   Quantity:  100 each   Refills:  6   Dose:  1 each    Instructions:  1 each by Misc.(Non-Drug; Combo Route) route 3 (three) times daily.     Begin Date    AM    Noon    PM    Bedtime            Where to Get Your Medications      These medications were sent to [facility] [facility] [address] [facility]  [address], [facility] [address]    Hours:  24-hours Phone:  [phone]     ciprofloxacin HCl 500 MG tablet    metronidazole 500 MG tablet                  Please bring to all follow up appointments:    1. A copy of your discharge instructions.  2. All medicines you are currently taking in their original bottles.  3. Identification and insurance card.    Please arrive 15 minutes ahead of scheduled appointment time.    Please call 24 hours in advance if you must reschedule your appointment and/or time.        Your Scheduled Appointments     May 23, 2017  2:45 PM CDT   Established Patient with MD Dr. Suzy Woody ([facility])    [address] [place], [place] [id number] [phone]              Follow-up Information     Follow up with Suzy Shanks MD. Go on 5/23/2017.    Specialty:  Family Medicine    Why:  For Appointment on Tuesday 5/23/2017 @ 1:30PM    Contact information:    [address] [place], [place] [address] [phone]          Follow up with Magan Zhao MD. Go on 5/30/2017.    Specialty:  General Surgery    Why:  For Appointment on Tuesday 5/30/2017 @ 3PM.    Contact information:    [address] [place], [place] [address] [phone]            Primary Diagnosis     Your primary diagnosis was:  Inflammation Of Small Intestine And Colon      Admission Information     Date & Time Provider Department [facility]    5/13/2017  8:07 PM Suzy Shanks MD [facility] [id number]      Care Providers     Provider Role Specialty Primary office phone    Suzy Shanks MD Attending Provider Family Medicine [phone]    Magan Zhao MD Consulting Physician  General Surgery [phone]      Your Vitals Were     BP Pulse Temp Resp Height Weight    116/58 68 97.9 °F (36.6 °C) (Oral) 20 5' (1.524 m) 68 kg (150 lb)    Last Period SpO2 BMI          (LMP Unknown) 99% 29.29 kg/m2        Recent Lab Values        12/1/2011 4/30/2014 7/17/2014 9/2/2014 9/11/2014 10/4/2016 4/27/2017         4:20 PM  5:59 PM  1:43 PM  3:58 PM  3:19 PM 11:15 AM  9:23 AM     A1C 10.7 (H) 8.9 (H) 7.3 (H) 7.3 (H) 7.2 (H) 9.4 (H) 9.0 (H)     Comment for A1C at 11:15 AM on 10/4/2016:           Pre-diabetes: 5.7 - 6.4           Diabetes: >6.4           Glycemic control for adults with diabetes: <7.0      Comment for A1C at  9:23 AM on 4/27/2017:           Pre-diabetes: 5.7 - 6.4           Diabetes: >6.4           Glycemic control for adults with diabetes: <7.0        Pending Labs     Order Current Status    Blood Culture #1 **CANNOT BE ORDERED STAT** Preliminary result    Blood Culture #2 **CANNOT BE ORDERED STAT** Preliminary result      Allergies as of 5/15/2017        Reactions    Clindamycin Anaphylaxis, Hives      [facility]     [facility] Care [facility] - 24/7 Assistance  Unless otherwise directed by your provider, please contact [facility], our nurse care line that is available for 24/7 assistance.     Registered nurses in the [facility] provide clinical advisement, health education, appointment booking, and other advisory services.  Call for this free service at [phone].        Advance Directives     An advance directive is a document which, in the event you are no longer able to make decisions for yourself, tells your healthcare team what kind of treatment you do or do not want to receive, or who you would like to make those decisions for you.  If  you do not currently have an advance directive, [facility] encourages you to create one.  For more information call:  [phone]), [phone] ([phone]),  or log on to [email or web address].        Language Assistance Services     ATTENTION: Language assistance services are available, free of charge. Please call [phone].      ATENCIÓN: Si habla español, tiene a gray disposición servicios gratuitos de asistencia lingüística. Llame al [phone].     CHÚ Ý: N?u b?n nói Ti?ng Vi?t, có các d?ch v? h? tr? ngôn ng? mi?n phí dành cho b?n. G?i s? [phone].        Diabetes Discharge Instructions                                    [facility] complies with applicable Federal civil rights laws and does not discriminate on the basis of race, color, national origin, age, disability, or sex.

## 2017-05-14 PROBLEM — B96.89 BV (BACTERIAL VAGINOSIS): Status: ACTIVE | Noted: 2017-05-14

## 2017-05-14 PROBLEM — K52.9 ENTEROCOLITIS: Status: ACTIVE | Noted: 2017-05-14

## 2017-05-14 PROBLEM — N76.0 BV (BACTERIAL VAGINOSIS): Status: ACTIVE | Noted: 2017-05-14

## 2017-05-14 LAB
ALBUMIN SERPL BCP-MCNC: 2.7 G/DL
ALP SERPL-CCNC: 77 U/L
ALT SERPL W/O P-5'-P-CCNC: 11 U/L
ANION GAP SERPL CALC-SCNC: 8 MMOL/L
AST SERPL-CCNC: 15 U/L
BASOPHILS # BLD AUTO: 0.02 K/UL
BASOPHILS NFR BLD: 0.1 %
BILIRUB SERPL-MCNC: 0.6 MG/DL
BUN SERPL-MCNC: 4 MG/DL
C TRACH DNA SPEC QL NAA+PROBE: NOT DETECTED
CALCIUM SERPL-MCNC: 8.6 MG/DL
CHLORIDE SERPL-SCNC: 108 MMOL/L
CO2 SERPL-SCNC: 24 MMOL/L
CREAT SERPL-MCNC: 0.8 MG/DL
DIFFERENTIAL METHOD: ABNORMAL
EOSINOPHIL # BLD AUTO: 0 K/UL
EOSINOPHIL NFR BLD: 0.1 %
ERYTHROCYTE [DISTWIDTH] IN BLOOD BY AUTOMATED COUNT: 12.4 %
EST. GFR  (AFRICAN AMERICAN): >60 ML/MIN/1.73 M^2
EST. GFR  (NON AFRICAN AMERICAN): >60 ML/MIN/1.73 M^2
GLUCOSE SERPL-MCNC: 74 MG/DL
HCT VFR BLD AUTO: 34.7 %
HGB BLD-MCNC: 12.6 G/DL
LYMPHOCYTES # BLD AUTO: 1.1 K/UL
LYMPHOCYTES NFR BLD: 4.4 %
MCH RBC QN AUTO: 29.1 PG
MCHC RBC AUTO-ENTMCNC: 36.3 %
MCV RBC AUTO: 80 FL
MONOCYTES # BLD AUTO: 1 K/UL
MONOCYTES NFR BLD: 4 %
N GONORRHOEA DNA SPEC QL NAA+PROBE: DETECTED
NEUTROPHILS # BLD AUTO: 21.8 K/UL
NEUTROPHILS NFR BLD: 91.4 %
PLATELET # BLD AUTO: 360 K/UL
PMV BLD AUTO: 9.8 FL
POCT GLUCOSE: 188 MG/DL (ref 70–110)
POCT GLUCOSE: 224 MG/DL (ref 70–110)
POCT GLUCOSE: 231 MG/DL (ref 70–110)
POCT GLUCOSE: 314 MG/DL (ref 70–110)
POCT GLUCOSE: 64 MG/DL (ref 70–110)
POCT GLUCOSE: 82 MG/DL (ref 70–110)
POTASSIUM SERPL-SCNC: 3.6 MMOL/L
PROT SERPL-MCNC: 6.8 G/DL
RBC # BLD AUTO: 4.33 M/UL
SODIUM SERPL-SCNC: 140 MMOL/L
TOXIC GRANULES BLD QL SMEAR: PRESENT
WBC # BLD AUTO: 24.02 K/UL

## 2017-05-14 PROCEDURE — 63600175 PHARM REV CODE 636 W HCPCS: Performed by: NURSE PRACTITIONER

## 2017-05-14 PROCEDURE — 11000001 HC ACUTE MED/SURG PRIVATE ROOM

## 2017-05-14 PROCEDURE — 25000003 PHARM REV CODE 250: Performed by: NURSE PRACTITIONER

## 2017-05-14 PROCEDURE — 80053 COMPREHEN METABOLIC PANEL: CPT

## 2017-05-14 PROCEDURE — 85025 COMPLETE CBC W/AUTO DIFF WBC: CPT

## 2017-05-14 PROCEDURE — 25000003 PHARM REV CODE 250: Performed by: EMERGENCY MEDICINE

## 2017-05-14 PROCEDURE — 36415 COLL VENOUS BLD VENIPUNCTURE: CPT

## 2017-05-14 PROCEDURE — 63600175 PHARM REV CODE 636 W HCPCS: Performed by: EMERGENCY MEDICINE

## 2017-05-14 PROCEDURE — 63600175 PHARM REV CODE 636 W HCPCS: Performed by: SURGERY

## 2017-05-14 RX ORDER — HYDROMORPHONE HYDROCHLORIDE 2 MG/ML
1 INJECTION, SOLUTION INTRAMUSCULAR; INTRAVENOUS; SUBCUTANEOUS ONCE
Status: COMPLETED | OUTPATIENT
Start: 2017-05-14 | End: 2017-05-14

## 2017-05-14 RX ORDER — INSULIN ASPART 100 [IU]/ML
0-5 INJECTION, SOLUTION INTRAVENOUS; SUBCUTANEOUS
Status: DISCONTINUED | OUTPATIENT
Start: 2017-05-14 | End: 2017-05-15 | Stop reason: HOSPADM

## 2017-05-14 RX ORDER — MORPHINE SULFATE 10 MG/ML
4 INJECTION INTRAMUSCULAR; INTRAVENOUS; SUBCUTANEOUS EVERY 4 HOURS PRN
Status: DISCONTINUED | OUTPATIENT
Start: 2017-05-14 | End: 2017-05-14

## 2017-05-14 RX ORDER — SODIUM CHLORIDE 9 MG/ML
INJECTION, SOLUTION INTRAVENOUS CONTINUOUS
Status: DISCONTINUED | OUTPATIENT
Start: 2017-05-14 | End: 2017-05-15 | Stop reason: HOSPADM

## 2017-05-14 RX ORDER — MORPHINE SULFATE 10 MG/ML
2 INJECTION INTRAMUSCULAR; INTRAVENOUS; SUBCUTANEOUS EVERY 4 HOURS PRN
Status: DISCONTINUED | OUTPATIENT
Start: 2017-05-14 | End: 2017-05-14

## 2017-05-14 RX ORDER — GABAPENTIN 300 MG/1
300 CAPSULE ORAL DAILY PRN
COMMUNITY
End: 2018-09-17

## 2017-05-14 RX ORDER — HYDROMORPHONE HYDROCHLORIDE 2 MG/ML
0.5 INJECTION, SOLUTION INTRAMUSCULAR; INTRAVENOUS; SUBCUTANEOUS
Status: DISCONTINUED | OUTPATIENT
Start: 2017-05-14 | End: 2017-05-15 | Stop reason: HOSPADM

## 2017-05-14 RX ORDER — ONDANSETRON 2 MG/ML
4 INJECTION INTRAMUSCULAR; INTRAVENOUS EVERY 12 HOURS PRN
Status: DISCONTINUED | OUTPATIENT
Start: 2017-05-14 | End: 2017-05-15 | Stop reason: HOSPADM

## 2017-05-14 RX ORDER — METOCLOPRAMIDE HYDROCHLORIDE 5 MG/ML
5 INJECTION INTRAMUSCULAR; INTRAVENOUS EVERY 6 HOURS PRN
Status: DISCONTINUED | OUTPATIENT
Start: 2017-05-14 | End: 2017-05-15 | Stop reason: HOSPADM

## 2017-05-14 RX ORDER — METRONIDAZOLE 500 MG/100ML
500 INJECTION, SOLUTION INTRAVENOUS
Status: COMPLETED | OUTPATIENT
Start: 2017-05-14 | End: 2017-05-14

## 2017-05-14 RX ORDER — NALOXONE HCL 0.4 MG/ML
VIAL (ML) INJECTION
Status: COMPLETED
Start: 2017-05-14 | End: 2017-05-14

## 2017-05-14 RX ADMIN — DEXTROSE MONOHYDRATE 12.5 G: 25 INJECTION, SOLUTION INTRAVENOUS at 06:05

## 2017-05-14 RX ADMIN — METOCLOPRAMIDE 5 MG: 5 INJECTION, SOLUTION INTRAMUSCULAR; INTRAVENOUS at 10:05

## 2017-05-14 RX ADMIN — PIPERACILLIN SODIUM AND TAZOBACTAM SODIUM 4.5 G: 4; .5 INJECTION, POWDER, LYOPHILIZED, FOR SOLUTION INTRAVENOUS at 04:05

## 2017-05-14 RX ADMIN — SODIUM CHLORIDE: 0.9 INJECTION, SOLUTION INTRAVENOUS at 10:05

## 2017-05-14 RX ADMIN — INSULIN ASPART 2 UNITS: 100 INJECTION, SOLUTION INTRAVENOUS; SUBCUTANEOUS at 04:05

## 2017-05-14 RX ADMIN — ONDANSETRON 4 MG: 2 INJECTION INTRAMUSCULAR; INTRAVENOUS at 09:05

## 2017-05-14 RX ADMIN — METRONIDAZOLE 500 MG: 500 INJECTION, SOLUTION INTRAVENOUS at 12:05

## 2017-05-14 RX ADMIN — SODIUM CHLORIDE: 0.9 INJECTION, SOLUTION INTRAVENOUS at 01:05

## 2017-05-14 RX ADMIN — PIPERACILLIN SODIUM AND TAZOBACTAM SODIUM 4.5 G: 4; .5 INJECTION, POWDER, LYOPHILIZED, FOR SOLUTION INTRAVENOUS at 08:05

## 2017-05-14 RX ADMIN — PIPERACILLIN AND TAZOBACTAM 4.5 G: 4; .5 INJECTION, POWDER, LYOPHILIZED, FOR SOLUTION INTRAVENOUS; PARENTERAL at 12:05

## 2017-05-14 RX ADMIN — INSULIN ASPART 2 UNITS: 100 INJECTION, SOLUTION INTRAVENOUS; SUBCUTANEOUS at 10:05

## 2017-05-14 RX ADMIN — HYDROMORPHONE HYDROCHLORIDE 0.5 MG: 2 INJECTION INTRAMUSCULAR; INTRAVENOUS; SUBCUTANEOUS at 03:05

## 2017-05-14 RX ADMIN — MORPHINE SULFATE 4 MG: 10 INJECTION INTRAVENOUS at 01:05

## 2017-05-14 NOTE — ASSESSMENT & PLAN NOTE
Pt on Zosyn and Flagyl.  Will monitor WBC's.  Surgery says no need for surgical intervention at this time.  Will advance diet

## 2017-05-14 NOTE — CONSULTS
"[facility]  Surgery Consult    Subjective:     Reason for consultation: abdominal pain    History of Present Illness:   Patient is a 21 y.o. female presents with abdominal pain. Onset of symptoms was gradual starting 3 days ago with gradually worsening course since that time. The pain is located in the epigastrium and in the periumbilical area without radiation. Patient describes the pain as cramping and sharp and rated as severe. Pain has been associated with diarrhea and nausea. Patient denies bright red blood per rectum, constipation, melena and vomiting. Symptoms are aggravated by eating. Symptoms improve with pain meds.  Patient reports hx of feeling constipated and taking laxative x 2 w/ diarrhea since that time.  No hx of similar events.  No recent travel or abnormal PO intake.  No sick contacts.  No CMT on pelvic exam in ED.  Patient reports feeling better since admit.    Patient Active Problem List   Diagnosis    Type 1 diabetes mellitus not at goal    Hypoglycemia associated with diabetes    Status post  section    Dyslipidemia    BMI 30.0-30.9,adult    Hypercholesterolemia    Acute febrile illness          No current facility-administered medications on file prior to encounter.      Current Outpatient Prescriptions on File Prior to Encounter   Medication Sig Dispense Refill    blood sugar diagnostic Strp 1 strip by Misc.(Non-Drug; Combo Route) route 5 (five) times daily. 200 each 6    insulin glargine, TOUJEO, (TOUJEO SOLOSTAR) 300 unit/mL (1.5 mL) InPn pen Inject 40 Units into the skin every evening. 5 Syringe 6    insulin lispro (HUMALOG KWIKPEN) 100 unit/mL InPn pen 4 units TID AC plus Sliding scale insulin as directed 1 Box 1    lancets Misc Pt test 6 times daily, fast click lancets 200 each 11    MIRENA 20 mcg/24 hr (5 years) IUD   0    pen needle, diabetic (BD INSULIN PEN NEEDLE UF MINI) 31 gauge x 3/16" Ndle 1 each by Misc.(Non-Drug; Combo Route) route 3 (three) " times daily. 100 each 6    fluticasone (FLONASE) 50 mcg/actuation nasal spray 1 spray by Each Nare route 2 (two) times daily as needed for Rhinitis. 15 g 0    GLUCAGON EMERGENCY KIT, HUMAN, 1 mg injection   1          Review of patient's allergies indicates:   Allergen Reactions    Clindamycin Anaphylaxis and Hives          Past Medical History:   Diagnosis Date    Diabetes mellitus type I     Heart murmur     Sickle cell trait           Past Surgical History:   Procedure Laterality Date    cyst removed on buttox  12/2011    DILATION AND CURETTAGE OF UTERUS  05/17/13    PILONIDAL CYST DRAINAGE  2014          Family History   Problem Relation Age of Onset    Diabetes Paternal Grandmother     Hypertension Paternal Grandmother     Asthma Brother     Thyroid disease Mother           Social History     Social History    Marital status: Single     Spouse name: N/A    Number of children: N/A    Years of education: N/A     Occupational History    Hospitality at [employer]       Social History Main Topics    Smoking status: Never Smoker    Smokeless tobacco: Never Used    Alcohol use Not on file    Drug use: No    Sexual activity: Yes     Partners: Male     Birth control/ protection: IUD     Other Topics Concern    Not on file     Social History Narrative    No partner. In school, at [employer] for practical nursing.         Review of Systems:  Constitutional: positive for anorexia and fevers, negative for chills and night sweats  Respiratory: negative for cough, hemoptysis and wheezing  Cardiovascular: negative for chest pain, exertional chest pressure/discomfort and palpitations  Hematologic/lymphatic: negative for bleeding and easy bruising     Physical Exam:    Vitals:    05/14/17 0412   BP: (!) 114/59   Pulse: 81   Resp: 20   Temp: 97.2 °F (36.2 °C)       General: Alert and oriented, No acute distress.   Eye: Extraocular movements are intact, Normal conjunctiva.  No scleral icterus  HENT:  Normocephalic, Normal hearing, Oral mucosa is moist, No pharyngeal erythema.   Neck: Supple, Non-tender, No jugular venous distention.  No mass or LAD  Respiratory: Respirations are non-labored, Symmetrical chest wall expansion, No chest wall tenderness.   Cardiovascular: Normal rate, Regular rhythm, Extremities warm, No edema.   Gastrointestinal: Soft, Non-distended, min TTP upper abdomen.  No TTP at mcburney's.  No guarding or rebound.   Neurologic: Alert, Oriented, Normal motor function, No focal defects.   Psychiatric: Cooperative, Appropriate mood & affect, Normal judgment.    Data Review:  CBC:   Recent Labs  Lab 05/14/17 0450   WBC 24.02*   RBC 4.33   HGB 12.6   HCT 34.7*   *   MCV 80*   MCH 29.1   MCHC 36.3*     CMP:   Recent Labs  Lab 05/14/17 0450   GLU 74   CALCIUM 8.6*   ALBUMIN 2.7*   PROT 6.8      K 3.6   CO2 24      BUN 4*   CREATININE 0.8   ALKPHOS 77   ALT 11   AST 15   BILITOT 0.6     Microbiology Results (last 7 days)     Procedure Component Value Units Date/Time    Blood Culture #2 **CANNOT BE ORDERED STAT** [[id number]] Collected:  05/13/17 2350    Order Status:  Completed Specimen:  Blood from Peripheral, Hand, Left Updated:  05/14/17 0712     Blood Culture, Routine No Growth to date    Blood Culture #1 **CANNOT BE ORDERED STAT** [[id number]] Collected:  05/13/17 2345    Order Status:  Completed Specimen:  Blood from Peripheral, Antecubital, Left Updated:  05/14/17 0712     Blood Culture, Routine No Growth to date    Urine culture [[id number]] Collected:  05/13/17 2017    Order Status:  Sent Specimen:  Urine from Urine, Clean Catch Updated:  05/13/17 2317    C. trachomatis/N. gonorrhoeae by AMP DNA Cervix [[id number]] Collected:  05/13/17 2302    Order Status:  Sent Specimen:  Genital Updated:  05/13/17 2303          Recent Labs  Lab 05/13/17 2017   COLORU Yellow   SPECGRAV 1.010   PHUR 6.0   PROTEINUA Negative   BACTERIA Occasional   NITRITE Negative   LEUKOCYTESUR 2+*    UROBILINOGEN Negative     CT personally reviewed and d/w radiologist on at main [place].  Appendix readily visualized and normal in size and with gas in lumen.  Small bowel wall thickening c/w enteritis.    ASSESSMENT/PLAN:     Abdominal pain and diarrhea  - appendix readily visualized and wnl (normal size and air in lumen)  - radiographic evidence of enteritis   - WBC elevated  - continue fluids and IV abx for now  - ok for liquids from my standpoint    Continue supportive measures.  Will follow.

## 2017-05-14 NOTE — PLAN OF CARE
Problem: Patient Care Overview  Goal: Plan of Care Review  Outcome: Ongoing (interventions implemented as appropriate)  Plan of care established. Complaint of pain well controlled after administration of hydromorphone. IV fluids and antibiotics administered as ordered. q6h accuchecks. NPO. Remains free from falls or further trauma.

## 2017-05-14 NOTE — NURSING
Patient tolerated clear and full liquids well, advanced to 1800 ADA diet. Will continue to monitor patient. n

## 2017-05-14 NOTE — SUBJECTIVE & OBJECTIVE
"Past Medical History:   Diagnosis Date    Diabetes mellitus type I     Heart murmur     Sickle cell trait        Past Surgical History:   Procedure Laterality Date    cyst removed on buttox  12/2011    DILATION AND CURETTAGE OF UTERUS  05/17/13    PILONIDAL CYST DRAINAGE  2014       Review of patient's allergies indicates:   Allergen Reactions    Clindamycin Anaphylaxis and Hives       No current facility-administered medications on file prior to encounter.      Current Outpatient Prescriptions on File Prior to Encounter   Medication Sig    blood sugar diagnostic Strp 1 strip by Misc.(Non-Drug; Combo Route) route 5 (five) times daily.    insulin glargine, TOUJEO, (TOUJEO SOLOSTAR) 300 unit/mL (1.5 mL) InPn pen Inject 40 Units into the skin every evening.    insulin lispro (HUMALOG KWIKPEN) 100 unit/mL InPn pen 4 units TID AC plus Sliding scale insulin as directed    lancets Misc Pt test 6 times daily, fast click lancets    MIRENA 20 mcg/24 hr (5 years) IUD     pen needle, diabetic (BD INSULIN PEN NEEDLE UF MINI) 31 gauge x 3/16" Ndle 1 each by Misc.(Non-Drug; Combo Route) route 3 (three) times daily.    fluticasone (FLONASE) 50 mcg/actuation nasal spray 1 spray by Each Nare route 2 (two) times daily as needed for Rhinitis.    GLUCAGON EMERGENCY KIT, HUMAN, 1 mg injection      Family History     Problem Relation (Age of Onset)    Asthma Brother    Diabetes Paternal Grandmother    Hypertension Paternal Grandmother    Thyroid disease Mother        Social History Main Topics    Smoking status: Never Smoker    Smokeless tobacco: Never Used    Alcohol use Not on file    Drug use: No    Sexual activity: Yes     Partners: Male     Birth control/ protection: IUD     Review of Systems   All other systems reviewed and are negative.    Objective:     Vital Signs (Most Recent):  Temp: 97.2 °F (36.2 °C) (05/14/17 0412)  Pulse: 81 (05/14/17 0412)  Resp: 20 (05/14/17 0412)  BP: (!) 114/59 (05/14/17 0412)  SpO2: " 98 % (05/14/17 0412) Vital Signs (24h Range):  Temp:  [97.2 °F (36.2 °C)-101.5 °F (38.6 °C)] 97.2 °F (36.2 °C)  Pulse:  [] 81  Resp:  [16-20] 20  SpO2:  [98 %-100 %] 98 %  BP: ()/(42-65) 114/59     Weight: 68 kg (150 lb)  Body mass index is 29.29 kg/(m^2).    Physical Exam   Constitutional: She is oriented to person, place, and time. She appears well-developed and well-nourished.   HENT:   Head: Normocephalic and atraumatic.   Eyes: EOM are normal. Pupils are equal, round, and reactive to light.   Cardiovascular: Normal rate, regular rhythm and normal heart sounds.  Exam reveals no gallop and no friction rub.    No murmur heard.  Pulmonary/Chest: Effort normal and breath sounds normal.   Abdominal: Soft. Bowel sounds are normal. There is tenderness in the right lower quadrant and left lower quadrant. There is guarding. There is no rebound.   Neurological: She is alert and oriented to person, place, and time.   Skin: Skin is warm and dry.   Psychiatric: She has a normal mood and affect.        Significant Labs:   CBC:   Recent Labs  Lab 05/13/17 2035 05/14/17  0450   WBC 18.73* 24.02*   HGB 13.5 12.6   HCT 38.0 34.7*   * 360*     CMP:   Recent Labs  Lab 05/13/17 2035 05/14/17  0450   * 140   K 3.9 3.6    108   CO2 21* 24   * 74   BUN 5* 4*   CREATININE 0.9 0.8   CALCIUM 9.4 8.6*   PROT 7.6 6.8   ALBUMIN 3.2* 2.7*   BILITOT 0.4 0.6   ALKPHOS 91 77   AST 14 15   ALT 11 11   ANIONGAP 11 8   EGFRNONAA >60 >60     POCT Glucose:   Recent Labs  Lab 05/14/17  0152 05/14/17  0634 05/14/17  0727   POCTGLUCOSE 82 64* 188*     Urine Culture: No results for input(s): LABURIN in the last 48 hours.  Urine Studies:   Recent Labs  Lab 05/13/17 2017   COLORU Yellow   APPEARANCEUA Clear   PHUR 6.0   SPECGRAV 1.010   PROTEINUA Negative   GLUCUA Negative   KETONESU Negative   BILIRUBINUA Negative   OCCULTUA Negative   NITRITE Negative   UROBILINOGEN Negative   LEUKOCYTESUR 2+*   WBCUA 4   BACTERIA  Occasional   SQUAMEPITHEL 3       Significant Imaging: CT: I have reviewed all pertinent results/findings within the past 24 hours and my personal findings are:  Distention and fluid filled small and large bowel loops with minimal bowel wall thickening.  The findings are most suggestive of infectious enterocolitis.  No evidence of bowel obstruction.

## 2017-05-14 NOTE — NURSING
Arrived to unit via wheelchair accompanied by family. Oriented to room and unit. See flow sheet for admission and physical assessment. Complaint of severe, intermittent RLQ pain which is stabbing in quality. 4mg IV morphine administered as ordered.

## 2017-05-14 NOTE — ED PROVIDER NOTES
Encounter Date: 5/13/2017    SCRIBE #1 NOTE: I, Fabiola Zee, am scribing for, and in the presence of,  VINCENT Pretty. I have scribed the following portions of the note - Other sections scribed: HPI, ROS.       History     Chief Complaint   Patient presents with    Abdominal Pain     Complaining of severe abdominal pain and constipation x 3days. Denies N/V/D.     Review of patient's allergies indicates:   Allergen Reactions    Clindamycin Anaphylaxis and Hives     HPI Comments: CC: Abdominal Pain    HPI: This 21 year old female has a past medical history of diabetes mellitus type I, heart murmur, and sickle cell trait presents to the ED complaining of a 3 day history of generalized abdominal pain with an associated headache. Pt denies fever at home, shortness of breath, nausea, vomiting, and diarrhea. Pt also denies vaginal discharge, vaginal bleeding but reports frequency. Pt attempted Laxative yesterday with no relief. No other symptoms reported and no other treatments attempted prior to arrival.        The history is provided by the patient. No  was used.     Past Medical History:   Diagnosis Date    Diabetes mellitus type I     Heart murmur     Sickle cell trait      Past Surgical History:   Procedure Laterality Date    cyst removed on buttox  12/2011    DILATION AND CURETTAGE OF UTERUS  05/17/13    PILONIDAL CYST DRAINAGE  2014     Family History   Problem Relation Age of Onset    Diabetes Paternal Grandmother     Hypertension Paternal Grandmother     Asthma Brother     Thyroid disease Mother      Social History   Substance Use Topics    Smoking status: Never Smoker    Smokeless tobacco: Never Used    Alcohol use No     Review of Systems   Constitutional: Negative for fever.   HENT: Negative for sore throat.    Respiratory: Negative for shortness of breath.    Cardiovascular: Negative for chest pain.   Gastrointestinal: Positive for abdominal pain and constipation.  Negative for diarrhea, nausea and vomiting.   Genitourinary: Positive for frequency. Negative for dysuria and vaginal discharge.   Musculoskeletal: Negative for back pain.   Skin: Negative for rash.   Neurological: Negative for weakness.   Hematological: Does not bruise/bleed easily.       Physical Exam   Initial Vitals   BP Pulse Resp Temp SpO2   05/13/17 1956 05/13/17 1956 05/13/17 1956 05/13/17 1956 05/13/17 1956   120/64 127 16 100.5 °F (38.1 °C) 100 %     Physical Exam    Nursing note and vitals reviewed.  Constitutional: She appears well-developed and well-nourished. She is not diaphoretic. She is cooperative.  Non-toxic appearance. No distress.   Appears uncomfortable   HENT:   Head: Normocephalic and atraumatic.   Right Ear: External ear normal.   Left Ear: External ear normal.   Mouth/Throat: Oropharynx is clear and moist.   Eyes: Conjunctivae and EOM are normal.   Neck: Normal range of motion. No tracheal deviation and normal range of motion present. No rigidity.   Cardiovascular: Regular rhythm, normal heart sounds and normal pulses. Tachycardia present.    Pulses:       Radial pulses are 2+ on the right side, and 2+ on the left side.   Pulmonary/Chest: Effort normal and breath sounds normal. No stridor. No tachypnea and no bradypnea. No respiratory distress. She has no wheezes. She has no rhonchi. She has no rales. She exhibits no tenderness.   Abdominal: Soft. Bowel sounds are normal. She exhibits no distension, no abdominal bruit and no mass. There is tenderness (RLQ > periumbilical or suprapubic) in the right lower quadrant, periumbilical area and suprapubic area. There is no rigidity, no rebound, no guarding and no CVA tenderness.   Genitourinary: Pelvic exam was performed with patient supine. There is no rash, tenderness or lesion on the right labia. There is no rash, tenderness or lesion on the left labia. Cervix exhibits no motion tenderness, no discharge and no friability. Right adnexum displays  no mass, no tenderness and no fullness. Left adnexum displays no mass, no tenderness and no fullness. No erythema, tenderness or bleeding in the vagina. No signs of injury around the vagina. Vaginal discharge (scant thin clear/white ) found.   Genitourinary Comments: Exam chaperoned by: CASSANDRA Hoffman.  Cervical os is closed.  There is no CMT or adnexal tenderness or fullness.   Musculoskeletal: Normal range of motion.   Neurological: She is alert and oriented to person, place, and time. She has normal strength. GCS eye subscore is 4. GCS verbal subscore is 5. GCS motor subscore is 6.   Skin: Skin is warm, dry and intact. No rash noted. No cyanosis or erythema. Nails show no clubbing.   Psychiatric: She has a normal mood and affect. Her speech is normal and behavior is normal. Judgment and thought content normal.         ED Course   Procedures  Labs Reviewed   URINALYSIS - Abnormal; Notable for the following:        Result Value    Leukocytes, UA 2+ (*)     All other components within normal limits   CBC W/ AUTO DIFFERENTIAL - Abnormal; Notable for the following:     WBC 18.73 (*)     MCV 81 (*)     Platelets 354 (*)     Gran # 17.2 (*)     Lymph # 0.8 (*)     Gran% 92.1 (*)     Lymph% 4.2 (*)     Mono% 3.3 (*)     All other components within normal limits   COMPREHENSIVE METABOLIC PANEL - Abnormal; Notable for the following:     Sodium 135 (*)     CO2 21 (*)     Glucose 144 (*)     BUN, Bld 5 (*)     Albumin 3.2 (*)     All other components within normal limits   VAGINAL SCREEN - Abnormal; Notable for the following:     Clue Cells, Wet Prep Occasional (*)     WBC - Vaginal Screen Rare (*)     Bacteria - Vaginal Screen Occasional (*)     All other components within normal limits   POCT GLUCOSE - Abnormal; Notable for the following:     POCT Glucose 152 (*)     All other components within normal limits   POCT GLUCOSE - Abnormal; Notable for the following:     POCT Glucose 162 (*)     All other components within normal  limits   CULTURE, URINE   C. TRACHOMATIS/N. GONORRHOEAE BY AMP DNA   CULTURE, BLOOD   CULTURE, BLOOD   LIPASE   URINALYSIS MICROSCOPIC   POCT URINE PREGNANCY   POCT GLUCOSE MONITORING CONTINUOUS             Medical Decision Making:   Clinical Tests:   Lab Tests: Ordered and Reviewed  Radiological Study: Ordered and Reviewed       APC / Resident Notes:   This is an evaluation of a 21-year-old female that presents emergency Department with complaints of abdominal pain. Physical Exam shows a non-toxic, febrile, and well appearing female.  She has tenderness of the periumbilical area, suprapubic area, and right lower quadrant.  More tenderness in the right lower quadrant.  There is no rebound or guarding.  Bowel sounds regular.  Ears and throat without signs of infection.  Breath sounds are clear and equal auscultation.  Pelvic exam with scant thin clear/white discharge.  No CMT or adnexal tenderness or fullness.  She is febrile with a mild tachycardia however normotensive.  RESULTS: UPT negative.  CBC with leukocytosis of 18.73.  Normal H&H.  Elevated platelet count 354.  CMP: Sodium 135, CO2 21, glucose 144, and 3.2, AB 15, otherwise unremarkable.  Lipase 6.  Glucose 152 mg/dL.  Urinalysis with 2+ leukocytes, otherwise unremarkable.  Vaginal screen with clue cells.  CT of the abdomen and pelvis: Small bowel loops are distended with fluid.  Mild wall thickening of the small bowel loops.  The appendix is not consistently identified her mother no secondary findings of acute appendicitis.  There is moderate amount of fluid and stool in the large bowel.  There is prominence of the bilateral adnexa.  Trace amount of free fluid in the pelvis.    My overall impression is abdominal pain with leukocytosis and abnormal CAT scan. I considered, but at this time, do not suspect structure, bowel perforation, UTI, pyelonephritis, TOA, ovarian torsion, sepsis.  No evidence of acute intra-abdominal infection or appendicitis on CAT scan  at this time, however unable to completely rule out appendicitis at this time.  Given the findings of the CAT scan, the patient be admitted to the hospital for serial evaluations.     ED course: Tylenol, Bentyl, Zofran and IV fluids. After review of the patients physical exam, ED testing including the CAT scan, and history/symptoms, the patient will be admitted to the hospital as an Obs.  At 1011p Dr. Bustos was paged. Call returned at 1027p and the case was discussed.  Dr. Bustos will accept the admission on behalf of Dr. Shanks with recommendations for Gen Sx consult. Dr. Zhao was paged and the case was discussed with him at Dr. Bustos's request to make him aware of the patient. Admit orders will be completed. The diagnosis, treatment and plan for admission were discussed with the patient and understanding was verbalized. All questions or concerns have been addressed. This case was discussed with Dr. Escalante who is in agreement with my assessment and plan. ADELAIDE Valentine, VINCENT-C        Scribe Attestation:   Scribe #1: I performed the above scribed service and the documentation accurately describes the services I performed. I attest to the accuracy of the note.    Attending Attestation:           Physician Attestation for Scribe:  Physician Attestation Statement for Scribe #1: I, VINCENT Pretty, reviewed documentation, as scribed by Fabiola Zee in my presence, and it is both accurate and complete.                 ED Course     Clinical Impression:   The primary encounter diagnosis was Acute febrile illness. Diagnoses of Leukocytosis, unspecified type, Type 1 diabetes mellitus with complication, and Abdominal pain, unspecified location were also pertinent to this visit.    Disposition:   Disposition: Admitted  Condition: Stable       VINCENT Chiu  05/14/17 0029

## 2017-05-14 NOTE — PROGRESS NOTES
After administration of morphine, patient became very fidgety/hyperactive and pain was unrelieved. One time dose of dilaudid administered per Dr. Zhao. Patient now resting with no complaints of pain. Will notify MD for pain medication change.

## 2017-05-14 NOTE — H&P
[facility] Hospital Medicine  History & Physical    Patient Name: Jay Schmid  MRN: [id number]  Admission Date: 5/13/2017  Attending Physician: Suzy Shanks MD   Primary Care Provider: Suzy Shanks MD         Patient information was obtained from patient, parent and ER records.     Subjective:     Principal Problem:Enterocolitis    Chief Complaint:   Chief Complaint   Patient presents with    Abdominal Pain     Complaining of severe abdominal pain and constipation x 3days. Denies N/V/D.        HPI: 22yo Type 1 diabetic presents with abdominal pain in the RLQ.  Pt reports the pain became worse and she presented here to the ED.  CT initially read that the appendix could not be visualized and pt admitted for surgical evaluation.  Pt required pain medication overnight and she became hypotensive with morphine.  A rescue was called and pt was switched to Dilaudid which has controlled her pain    Past Medical History:   Diagnosis Date    Diabetes mellitus type I     Heart murmur     Sickle cell trait        Past Surgical History:   Procedure Laterality Date    cyst removed on buttox  12/2011    DILATION AND CURETTAGE OF UTERUS  05/17/13    PILONIDAL CYST DRAINAGE  2014       Review of patient's allergies indicates:   Allergen Reactions    Clindamycin Anaphylaxis and Hives       No current facility-administered medications on file prior to encounter.      Current Outpatient Prescriptions on File Prior to Encounter   Medication Sig    blood sugar diagnostic Strp 1 strip by Misc.(Non-Drug; Combo Route) route 5 (five) times daily.    insulin glargine, TOUJEO, (TOUJEO SOLOSTAR) 300 unit/mL (1.5 mL) InPn pen Inject 40 Units into the skin every evening.    insulin lispro (HUMALOG KWIKPEN) 100 unit/mL InPn pen 4 units TID AC plus Sliding scale insulin as directed    lancets Misc Pt test 6 times daily, fast click lancets    MIRENA 20 mcg/24 hr (5 years) IUD     pen needle, diabetic (BD  "INSULIN PEN NEEDLE UF MINI) 31 gauge x 3/16" Ndle 1 each by Misc.(Non-Drug; Combo Route) route 3 (three) times daily.    fluticasone (FLONASE) 50 mcg/actuation nasal spray 1 spray by Each Nare route 2 (two) times daily as needed for Rhinitis.    GLUCAGON EMERGENCY KIT, HUMAN, 1 mg injection      Family History     Problem Relation (Age of Onset)    Asthma Brother    Diabetes Paternal Grandmother    Hypertension Paternal Grandmother    Thyroid disease Mother        Social History Main Topics    Smoking status: Never Smoker    Smokeless tobacco: Never Used    Alcohol use Not on file    Drug use: No    Sexual activity: Yes     Partners: Male     Birth control/ protection: IUD     Review of Systems   All other systems reviewed and are negative.    Objective:     Vital Signs (Most Recent):  Temp: 97.2 °F (36.2 °C) (05/14/17 0412)  Pulse: 81 (05/14/17 0412)  Resp: 20 (05/14/17 0412)  BP: (!) 114/59 (05/14/17 0412)  SpO2: 98 % (05/14/17 0412) Vital Signs (24h Range):  Temp:  [97.2 °F (36.2 °C)-101.5 °F (38.6 °C)] 97.2 °F (36.2 °C)  Pulse:  [] 81  Resp:  [16-20] 20  SpO2:  [98 %-100 %] 98 %  BP: ()/(42-65) 114/59     Weight: 68 kg (150 lb)  Body mass index is 29.29 kg/(m^2).    Physical Exam   Constitutional: She is oriented to person, place, and time. She appears well-developed and well-nourished.   HENT:   Head: Normocephalic and atraumatic.   Eyes: EOM are normal. Pupils are equal, round, and reactive to light.   Cardiovascular: Normal rate, regular rhythm and normal heart sounds.  Exam reveals no gallop and no friction rub.    No murmur heard.  Pulmonary/Chest: Effort normal and breath sounds normal.   Abdominal: Soft. Bowel sounds are normal. There is tenderness in the right lower quadrant and left lower quadrant. There is guarding. There is no rebound.   Neurological: She is alert and oriented to person, place, and time.   Skin: Skin is warm and dry.   Psychiatric: She has a normal mood and affect. "        Significant Labs:   CBC:   Recent Labs  Lab 05/13/17 2035 05/14/17 0450   WBC 18.73* 24.02*   HGB 13.5 12.6   HCT 38.0 34.7*   * 360*     CMP:   Recent Labs  Lab 05/13/17 2035 05/14/17 0450   * 140   K 3.9 3.6    108   CO2 21* 24   * 74   BUN 5* 4*   CREATININE 0.9 0.8   CALCIUM 9.4 8.6*   PROT 7.6 6.8   ALBUMIN 3.2* 2.7*   BILITOT 0.4 0.6   ALKPHOS 91 77   AST 14 15   ALT 11 11   ANIONGAP 11 8   EGFRNONAA >60 >60     POCT Glucose:   Recent Labs  Lab 05/14/17  0152 05/14/17  0634 05/14/17  0727   POCTGLUCOSE 82 64* 188*     Urine Culture: No results for input(s): LABURIN in the last 48 hours.  Urine Studies:   Recent Labs  Lab 05/13/17 2017   COLORU Yellow   APPEARANCEUA Clear   PHUR 6.0   SPECGRAV 1.010   PROTEINUA Negative   GLUCUA Negative   KETONESU Negative   BILIRUBINUA Negative   OCCULTUA Negative   NITRITE Negative   UROBILINOGEN Negative   LEUKOCYTESUR 2+*   WBCUA 4   BACTERIA Occasional   SQUAMEPITHEL 3       Significant Imaging: CT: I have reviewed all pertinent results/findings within the past 24 hours and my personal findings are:  Distention and fluid filled small and large bowel loops with minimal bowel wall thickening.  The findings are most suggestive of infectious enterocolitis.  No evidence of bowel obstruction.    Assessment/Plan:     * Enterocolitis  Pt on Zosyn and Flagyl.  Will monitor WBC's.  Surgery says no need for surgical intervention at this time.  Will advance diet      Type 1 diabetes mellitus not at goal  Will cover patient with sliding scale until back eating regular diet      BV (bacterial vaginosis)  Will get pelvic US for completeness      VTE Risk Mitigation         Ordered     Low Risk of VTE  Once      05/14/17 0046        Julianna Beach MD  Department of Hospital Medicine   [facility]

## 2017-05-14 NOTE — PLAN OF CARE
05/14/17 1644   Discharge Assessment   Assessment Type Discharge Planning Assessment   Confirmed/corrected address and phone number on facesheet? Yes   Assessment information obtained from? Patient   Communicated expected length of stay with patient/caregiver no   If Healthcare Directive is received, is it scanned into Epic? no (comment)   Prior to hospitilization cognitive status: Alert/Oriented;No Deficits   Prior to hospitalization functional status: Independent;Assistive Equipment   Current cognitive status: Alert/Oriented;No Deficits   Current Functional Status: Independent   Arrived From admitted as an inpatient   Lives With alone   Able to Return to Prior Arrangements yes   How many people do you have in your home that can help with your care after discharge? 0   Who are your caregiver(s) and their phone number(s)? (Mother, Earline [phone]    )   Patient's perception of discharge disposition admitted as an inpatient   Readmission Within The Last 30 Days no previous admission in last 30 days   Patient currently being followed by outpatient case management? No   Patient currently receives home health services? No   Does the patient currently use HME? Yes   Patient currently receives private duty nursing? No   Patient currently receives any other outside agency services? No   Equipment Currently Used at Home glucometer   Do you have any problems affording any of your prescribed medications? No   Is the patient taking medications as prescribed? yes   Do you have any financial concerns preventing you from receiving the healthcare you need? No   Does the patient have transportation to healthcare appointments? Yes   Transportation Available car;family or friend will provide   On Dialysis? No   Does the patient receive services at the Coumadin Clinic? No   Are there any open cases? No   Discharge Plan A Home   Discharge Plan B Home with family   Patient/Family In Agreement With Plan yes   Help at home from  mother, Earline Danielsdix   [phone]              [facility] [address] [place] [address] [address]  [address]  [facility] [address]  [facility] [address]  Phone: [phone] Fax: [phone]    [facility] [address] [place] [address] [address]  [address]  [facility] [address]  [facility] [address]  Phone: [phone] Fax: [phone]    [facility] [address] [place] [address] [address] AT [address]  [place] [address]  Phone: [phone] Fax: [phone]

## 2017-05-14 NOTE — NURSING
"Pt called from restroom with complaint that, "I feel like I'm about to pass out." BP 80/40 HR 40bpm CBG 82. Watery bowel movement noted. Assisted back to bed. Restless and complaining of increased, diffuse abdominal pain. Charge nurse and ICU charge nurse at bedside. Dr. Zhao notified of change in condition. Will notify primary. BP now 109/55, HR 67.     "

## 2017-05-15 VITALS
SYSTOLIC BLOOD PRESSURE: 116 MMHG | DIASTOLIC BLOOD PRESSURE: 58 MMHG | WEIGHT: 150 LBS | RESPIRATION RATE: 20 BRPM | HEART RATE: 68 BPM | BODY MASS INDEX: 29.45 KG/M2 | TEMPERATURE: 98 F | HEIGHT: 60 IN | OXYGEN SATURATION: 99 %

## 2017-05-15 LAB
ALBUMIN SERPL BCP-MCNC: 2.4 G/DL
ALP SERPL-CCNC: 64 U/L
ALT SERPL W/O P-5'-P-CCNC: 9 U/L
ANION GAP SERPL CALC-SCNC: 6 MMOL/L
AST SERPL-CCNC: 11 U/L
BACTERIA UR CULT: NORMAL
BASOPHILS # BLD AUTO: 0.02 K/UL
BASOPHILS NFR BLD: 0.2 %
BILIRUB SERPL-MCNC: 0.3 MG/DL
BUN SERPL-MCNC: 6 MG/DL
CALCIUM SERPL-MCNC: 8.6 MG/DL
CHLORIDE SERPL-SCNC: 107 MMOL/L
CO2 SERPL-SCNC: 23 MMOL/L
CREAT SERPL-MCNC: 0.9 MG/DL
DIFFERENTIAL METHOD: ABNORMAL
EOSINOPHIL # BLD AUTO: 0.2 K/UL
EOSINOPHIL NFR BLD: 2.2 %
ERYTHROCYTE [DISTWIDTH] IN BLOOD BY AUTOMATED COUNT: 12.5 %
EST. GFR  (AFRICAN AMERICAN): >60 ML/MIN/1.73 M^2
EST. GFR  (NON AFRICAN AMERICAN): >60 ML/MIN/1.73 M^2
GLUCOSE SERPL-MCNC: 334 MG/DL
HCT VFR BLD AUTO: 33.9 %
HGB BLD-MCNC: 12 G/DL
LYMPHOCYTES # BLD AUTO: 2.2 K/UL
LYMPHOCYTES NFR BLD: 22 %
MCH RBC QN AUTO: 28.5 PG
MCHC RBC AUTO-ENTMCNC: 35.4 %
MCV RBC AUTO: 81 FL
MONOCYTES # BLD AUTO: 0.4 K/UL
MONOCYTES NFR BLD: 3.7 %
NEUTROPHILS # BLD AUTO: 7.2 K/UL
NEUTROPHILS NFR BLD: 71.9 %
PLATELET # BLD AUTO: 330 K/UL
PMV BLD AUTO: 9.3 FL
POCT GLUCOSE: 301 MG/DL (ref 70–110)
POTASSIUM SERPL-SCNC: 3.7 MMOL/L
PROT SERPL-MCNC: 6 G/DL
RBC # BLD AUTO: 4.21 M/UL
SODIUM SERPL-SCNC: 136 MMOL/L
WBC # BLD AUTO: 9.98 K/UL

## 2017-05-15 PROCEDURE — 85025 COMPLETE CBC W/AUTO DIFF WBC: CPT

## 2017-05-15 PROCEDURE — 36415 COLL VENOUS BLD VENIPUNCTURE: CPT

## 2017-05-15 PROCEDURE — 94761 N-INVAS EAR/PLS OXIMETRY MLT: CPT

## 2017-05-15 PROCEDURE — 25000003 PHARM REV CODE 250: Performed by: NURSE PRACTITIONER

## 2017-05-15 PROCEDURE — 63600175 PHARM REV CODE 636 W HCPCS

## 2017-05-15 PROCEDURE — 80053 COMPREHEN METABOLIC PANEL: CPT

## 2017-05-15 RX ORDER — CIPROFLOXACIN 500 MG/1
500 TABLET ORAL 2 TIMES DAILY
Qty: 20 TABLET | Refills: 0 | Status: SHIPPED | OUTPATIENT
Start: 2017-05-15 | End: 2017-05-25

## 2017-05-15 RX ORDER — METRONIDAZOLE 500 MG/1
500 TABLET ORAL 3 TIMES DAILY
Qty: 42 TABLET | Refills: 0 | Status: SHIPPED | OUTPATIENT
Start: 2017-05-15 | End: 2017-05-29

## 2017-05-15 RX ADMIN — INSULIN DETEMIR 30 UNITS: 100 INJECTION, SOLUTION SUBCUTANEOUS at 08:05

## 2017-05-15 RX ADMIN — PIPERACILLIN SODIUM AND TAZOBACTAM SODIUM 4.5 G: 4; .5 INJECTION, POWDER, LYOPHILIZED, FOR SOLUTION INTRAVENOUS at 08:05

## 2017-05-15 RX ADMIN — PIPERACILLIN SODIUM AND TAZOBACTAM SODIUM 4.5 G: 4; .5 INJECTION, POWDER, LYOPHILIZED, FOR SOLUTION INTRAVENOUS at 12:05

## 2017-05-15 NOTE — PLAN OF CARE
Problem: Patient Care Overview  Goal: Plan of Care Review  Outcome: Ongoing (interventions implemented as appropriate)  Resting comfortably this shift. Denies abdominal pain but complains of nausea/cramping which is adequately relieved with PRN medication. VSSAF. IV fluids and antibiotics administered as ordered. Elevated blood sugar managed with sliding scale insulin. Ambulates independently. Remains free from falls or further trauma.

## 2017-05-15 NOTE — PROGRESS NOTES
"Call placed to Dr. Zhao's office [phone], spoke to Chayo. Follow up appointment arranged for patient to see MD on Thursday 5/30/2017 @ 3PM. All information added to "follow up" tab.      Call placed to Dr. Farfan office [phone], spoke to Vanessa. Follow up appointment arranged for patient to see MD on Thursday 5/23/2017 @ 1:30PM. All information added to "follow up" tab.    "

## 2017-05-15 NOTE — NURSING
Patient discharged per MD order. IV removed. Catheter tip intact. No distress noted. Patient given d/c instructions, rx, and return to work note. Patient verbalized understanding.

## 2017-05-15 NOTE — DISCHARGE SUMMARY
"Discharge summary dictated # [id number]      Home Medication Instructions   blood sugar diagnostic Strp  1 strip by Misc.(Non-Drug; Combo Route) route 5 (five) times daily.     ciprofloxacin HCl (CIPRO) 500 MG tablet  Take 1 tablet (500 mg total) by mouth 2 (two) times daily.     fluticasone (FLONASE) 50 mcg/actuation nasal spray  1 spray by Each Nare route 2 (two) times daily as needed for Rhinitis.     gabapentin (NEURONTIN) 300 MG capsule  Take 300 mg by mouth daily as needed.     GLUCAGON EMERGENCY KIT, HUMAN, 1 mg injection       insulin glargine, TOUJEO, (TOUJEO SOLOSTAR) 300 unit/mL (1.5 mL) InPn pen  Inject 40 Units into the skin every evening.     insulin lispro (HUMALOG KWIKPEN) 100 unit/mL InPn pen  4 units TID AC plus Sliding scale insulin as directed     lancets Misc  Pt test 6 times daily, fast click lancets     metronidazole (FLAGYL) 500 MG tablet  Take 1 tablet (500 mg total) by mouth 3 (three) times daily.     MIRENA 20 mcg/24 hr (5 years) IUD       pen needle, diabetic (BD INSULIN PEN NEEDLE UF MINI) 31 gauge x 3/16" Ndle  1 each by Misc.(Non-Drug; Combo Route) route 3 (three) times daily.       "

## 2017-05-15 NOTE — PROGRESS NOTES
WRITTEN HEALTHCARE DISCHARGE INFORMATION     Things that YOU are responsible for to Manage Your Care At Home:  1. Getting your prescriptions filled.  2. Taking you medications as directed. DO NOT MISS ANY DOSES!  3. Going to your follow-up doctor appointments. This is important because it allows the doctor to monitor your progress and to determine if any changes need to be made to your treatment plan.    If you are unable to make your follow up appointments, please call the number listed and reschedule this appointment.     After discharge, if you need assistance, you can call [facility] Line for 24/7 assistance at [phone]    Thank you for choosing [facility] and allowing us to care for you.   From your care management team: Denise Canseco [phone] or [phone]     You should receive a call from [facility] within 48-72 hours to help manage your care after discharge. Please try to make sure that you answer your phone for this important phone call.     Follow-up Information     Follow up with Suzy Shanks MD. Go on 5/23/2017.    Specialty:  Family Medicine    Why:  For Appointment on Tuesday 5/23/2017 @ 1:30PM    Contact information:    [address] [place] [address]  [phone]          Follow up with Magan Zhao MD. Go on 5/30/2017.    Specialty:  General Surgery    Why:  For Appointment on Tuesday 5/30/2017 @ 3PM.    Contact information:    [address] [place] [address]  [phone]

## 2017-05-15 NOTE — PROGRESS NOTES
TN reviewed follow up appointment information and was informed of [facility]. Patient is in agreement and verbalized an understanding. Placed discharge information in blue discharge folder.  Patient was reminded to call the number written on the dry erase board for any oather questions, concerns, or needs.  TN also reviewed patient responsibility checklist with her using teach back. Patient was able to verbalize her responsibilities after discharge to manage her care at home being   1. Going to follow up appointments   2.  rx from the pharmacy when discharged  3. Taking her medication as prescribed       Patients nurse, Dahiana,  informed that patient can discharge from  standpoint.

## 2017-05-15 NOTE — PROVIDER PROGRESS NOTES - EMERGENCY DEPT.
Encounter Date: 5/13/2017    ED Physician Progress Notes           Ms. Schmid had a positive gonorrhea test that resulted after being discharged.  She was not suspected of having cervicitis during her stay.  She was treated for enteritis, in the ED initially with broad-spectrum antibiotics including Zosyn.  Although Zosyn may cover gonorrhea, the patient was contacted and the results of her tests were discussed.  Suprax was called in for her to take a one-time dose of 400 mg by mouth to ensure appropriate coverage for gonorrhea.  Patient asymptomatic during phone call encounter.  She confirmed ALLERGY to clindamycin only.  Patient was given instructions to inform her sexual partners and to abstain from sexual intercourse for 2 weeks.  Patient verbalized understanding and agreed with plan.

## 2017-05-15 NOTE — PROGRESS NOTES
Progress Note    Admit Date: 5/13/2017   LOS: 2 days     SUBJECTIVE:       Patient denies abd pain.  Feels much better.  Reports tolerating her diet w/out n/v.  afebrile    OBJECTIVE:       Vital Signs Range (Last 24H):  Temp:  [98 °F (36.7 °C)-98.2 °F (36.8 °C)]   Pulse:  [64-80]   Resp:  [16-20]   BP: (106-117)/(53-65)   SpO2:  [98 %-100 %]     I & O (Last 24H):  Intake/Output Summary (Last 24 hours) at 05/15/17 0654  Last data filed at 05/15/17 0600   Gross per 24 hour   Intake              960 ml   Output                0 ml   Net              960 ml         Physical Exam:  NAD  Abdomen: soft, non-tender, non-distended.    Laboratory:  CBC:   Recent Labs  Lab 05/15/17  0504   WBC 9.98   RBC 4.21   HGB 12.0   HCT 33.9*      MCV 81*   MCH 28.5   MCHC 35.4           ASSESSMENT/PLAN:     Enteritis  - clinically improved  - exam benign  - continue observation from my standpoint    Dispo as per primary team

## 2017-05-15 NOTE — PLAN OF CARE
05/15/17 1401   Final Note   Assessment Type Final Discharge Note   Discharge Disposition Home   Discharge planning education complete? Yes   What phone number can be called within the next 1-3 days to see how you are doing after discharge? [phone]   Hospital Follow Up  Appt(s) scheduled? Yes   Discharge plans and expectations educations in teach back method with documentation complete? Yes   Offered [facility] -- Bedside Delivery? n/a   Discharge/Hospital Encounter Summary to (non-[facility]) PCP No   Referral to Outpatient Case Management complete? No   Referral to / orders for Home Health Complete? No   30 day supply of medicines given at discharge, if documented non-compliance / non-adherence? No   Any social issues identified prior to discharge? No   Did you assess the readiness or willingness of the family or caregiver to support self management of care? Yes   Right Care Referral Info   Post Acute Recommendation No Care

## 2017-05-16 NOTE — DISCHARGE SUMMARY
HISTORY OF PRESENT ILLNESS:  A 21-year-old -American female with history   of type 1 diabetes mellitus and sickle cell trait, admitted to [facility] on 05/13/2017 with complaints of fever and right lower quadrant   abdominal pain starting a few hours prior to arrival at [facility].    PHYSICAL EXAMINATION:  GENERAL:  Revealed the patient, alert.  ABDOMEN:  She had right lower quadrant tenderness and left lower quadrant   tenderness, guarding, but without rebound tenderness.  Bowel sounds were   normoactive.  LUNGS:  Clear.  HEART:  Regular.    LABORATORY DATA:  Initial laboratory data showed WBC of 24.02, hemoglobin 12.6,   hematocrit 34.7 and platelet count 360.  Sodium was 140, potassium 3.6, chloride   108, CO2 24, AST 15, ALT 11, alkaline phosphatase 77.  A CT scan of the abdomen   revealed distention and fluid filled small and large bowel loops with minimal   bowel wall thickening, finding more suggestive of infectious enterocolitis.    There was no evidence of bowel obstruction.    HOSPITAL COURSE:  The patient was admitted with a diagnosis of enterocolitis and   was started on Zosyn and Flagyl.  Surgery consult was requested and after   evaluation, it was concluded that this patient did not need any surgical   intervention at this time.  Glucose was monitored and NovoLog was given as   needed according to a sliding scale.  Because of the diagnosis of bacterial   vaginosis, a pelvic ultrasound was ordered and showed an intrauterine device,   the complex 1.8 cm cystic structure in the left ovary representing likely a   collapsing ovarian cyst/follicle, short-term followup was suggested.  The   patient's condition improved rapidly during the hospital stay.  Her WBC count   dropped to 9.9 on 05/15/2017.  Her home insulin was resumed.  During physical   examination, on 05/15/2017, the patient was back to her baseline.  Abdomen was   soft, nontender, without masses and the  bowel sounds were normoactive.  An IV   Zosyn was discontinued and the patient was discharged home in good condition   with prescriptions for ciprofloxacin 500 mg p.o. b.i.d. and Flagyl 500 mg p.o.   q. 8 hours.  Followup appointment was given with Dr. Shanks in 1 week.  She   will continue a diabetic diet and activities will be as tolerated.    FINAL DIAGNOSES:  Enterocolitis, acute febrile illness, bacterial vaginosis,   dyslipidemia, type 1 diabetes mellitus, uncontrolled.      DELONTE  dd: 05/15/2017 19:19:12 (CDT)  td: 05/16/2017 03:24:16 (CDT)  Doc ID   #[id number]  Job ID #[id number]    CC:

## 2017-05-17 ENCOUNTER — PATIENT OUTREACH (OUTPATIENT)
Dept: ADMINISTRATIVE | Facility: CLINIC | Age: 22
End: 2017-05-17
Payer: MEDICAID

## 2017-05-18 NOTE — PHYSICIAN QUERY
"PT Name: Jay Schmid  MR #: [id number]     Physician Query Form - Documentation Clarification      CDS/: Tony Oliver RN               Contact information:[email or web address]    This form is a permanent document in the medical record.     Query Date: May 18, 2017    By submitting this query, we are merely seeking further clarification of documentation. Please utilize your independent clinical judgment when addressing the question(s) below.    The Medical record reflects the following:    Supporting Clinical Findings Location in Medical Record     Hypoglycemia associated with diabetes        5/14  Consult- gen. Surg: problem list     "...type 1 diabetes mellitus, uncontrolled."     Glucose:   144,  74,  334    POCT Glucose: 152, 162, 82, 64, 188, 224, 231, 314, 301   5/15  Discharge Summ- [place]    5/13-15  Labs    5/13-14  Point of Care- Labs                                                                            Doctor, Please specify diagnosis Diabetes mellitus, type 1, associated with above clinical findings.    Provider Use Only     [ X ] Diabetes mellitus, type 1, Hyperglycemic, poorly controlled     [  ] Diabetes mellitus, type 1, hypoglycemic, poorly controlled     [  ] Other/specify: ____________________________                                                                                                              [  ] Clinically undetermined            "

## 2017-05-19 LAB
BACTERIA BLD CULT: NORMAL
BACTERIA BLD CULT: NORMAL

## 2017-07-19 ENCOUNTER — OFFICE VISIT (OUTPATIENT)
Dept: ENDOCRINOLOGY | Facility: CLINIC | Age: 22
End: 2017-07-19
Payer: MEDICAID

## 2017-07-19 VITALS
BODY MASS INDEX: 30.26 KG/M2 | HEIGHT: 60 IN | HEART RATE: 88 BPM | DIASTOLIC BLOOD PRESSURE: 65 MMHG | SYSTOLIC BLOOD PRESSURE: 102 MMHG | WEIGHT: 154.13 LBS

## 2017-07-19 DIAGNOSIS — E10.65 TYPE 1 DIABETES MELLITUS WITH HYPERGLYCEMIA: Primary | ICD-10-CM

## 2017-07-19 DIAGNOSIS — E11.649 HYPOGLYCEMIA ASSOCIATED WITH DIABETES: ICD-10-CM

## 2017-07-19 DIAGNOSIS — E66.9 OBESITY (BMI 30-39.9): ICD-10-CM

## 2017-07-19 DIAGNOSIS — Z71.9 HEALTH EDUCATION/COUNSELING: ICD-10-CM

## 2017-07-19 PROCEDURE — 99213 OFFICE O/P EST LOW 20 MIN: CPT | Mod: PBBFAC | Performed by: NURSE PRACTITIONER

## 2017-07-19 PROCEDURE — 99214 OFFICE O/P EST MOD 30 MIN: CPT | Mod: S$PBB,,, | Performed by: NURSE PRACTITIONER

## 2017-07-19 PROCEDURE — 3045F PR MOST RECENT HEMOGLOBIN A1C LEVEL 7.0-9.0%: CPT | Mod: ,,, | Performed by: NURSE PRACTITIONER

## 2017-07-19 PROCEDURE — 99999 PR PBB SHADOW E&M-EST. PATIENT-LVL III: CPT | Mod: PBBFAC,,, | Performed by: NURSE PRACTITIONER

## 2017-07-19 RX ORDER — INSULIN LISPRO 100 [IU]/ML
INJECTION, SOLUTION INTRAVENOUS; SUBCUTANEOUS
Qty: 1 BOX | Refills: 6 | Status: SHIPPED | OUTPATIENT
Start: 2017-07-19 | End: 2017-10-18 | Stop reason: SDUPTHER

## 2017-07-19 RX ORDER — PEN NEEDLE, DIABETIC 30 GX3/16"
NEEDLE, DISPOSABLE MISCELLANEOUS
Qty: 120 EACH | Refills: 6 | Status: SHIPPED | OUTPATIENT
Start: 2017-07-19 | End: 2018-09-24 | Stop reason: SDUPTHER

## 2017-07-19 RX ORDER — INSULIN GLARGINE 100 [IU]/ML
INJECTION, SOLUTION SUBCUTANEOUS
Qty: 1 BOX | Refills: 6 | Status: SHIPPED | OUTPATIENT
Start: 2017-07-19 | End: 2017-10-18 | Stop reason: SDUPTHER

## 2017-07-19 RX ORDER — INSULIN GLARGINE 100 [IU]/ML
40 INJECTION, SOLUTION SUBCUTANEOUS DAILY
Refills: 5 | COMMUNITY
Start: 2017-05-24 | End: 2017-07-19

## 2017-07-19 NOTE — PROGRESS NOTES
CC: This 21 y.o. AA female presents for management of T1DM and review of chronic medical conditions.     HPI:   Diagnosed with T1DM in . Reports a syncopal episode at the time of diagnosis and BG readings were HIGH.   + reports she has been hospitalized for hyperglycemia with BG readings in the 600's during her pregnancy. She reports she was off insulin at that time and on oral medications.   Denies hx of DKA though.      She was seen last in clinic by PARIS Vo NP in 2014. She has not been seen in Endocrine since. She was pregnant when PARIS Vo NP saw her last. Now .  Reports her PCP has been managing her BG. She does not plan to get pregnant at this time as she has the Mirena     She is being seen by me for the first time today. She has had 3 ER visits his year so far with 1 admission for acute febrile illness in May 2017.      She does not have a glucagon kit at home. She has a medic alert bracelet.     CURRENT DM MEDS:  Lantus 40 units daily, humalog 8 units AC with low dose correction scale 150-200 +1, etc.  She reports leaving her insulin in the car- most days. Insulin pens that she is not     Pt denies missed doses. Reports sometimes she is taking after it before she eats.     Pt is monitoring BG at home 3-4 times a day.   Jay TEJEDA Sharmaine brought glucometer or log to clinic today revealing the following BG readings:  30 day average- ~249  14 day avg 211  7 day avg 210  Time and date is inaccurate in her meter.   Variable readings found on review- anywhere from 's. She reports that she is mostly in the 200's.     Feels s/s of hypoglycemia when BG <120's. Today she felt hypoglycemia with . Treats with food. Reports yesterday her 63 after giving herself extra insulin. Doesn't have hypoglycemia often.     DIET/ MEAL PATTERN: eats more towards the end of the day after work.   Grits, sandwiches, baked chips, pasta.   Drinks mostly water.   Reports she rarely snacks.      EXERCISE:  walks everyday. Walks the stairs.     Works at a hotel during the day.     STANDARDS OF CARE:   Eye exam: 2014  Foot exam: none   ASA: none  Statin: none  ACE-I/ARB: none    ROS:   Gen: Appetite good, no weight gain or loss, denies fatigue and weakness.  Skin: Skin is intact and heals well, no rashes, pruritis, easy bruising, no hair changes, no intolerance to heat/cold.  Eyes: + blurry vision   Resp: no SOB or HARRIS, no cough  Cardiac: No palpitations, chest pain, denies syncope, weakness, no edema or cyanosis.  GI: No nausea or vomiting, diarrhea, constipation, or abdominal pain.  /GYN: No nocturia, no urinary frequency, burning or pain.   PVD: No leg pain with or without exercise, denies cyanosis, pallor, or cold extremities.  MS/Neuro: Denies numbness/ tingling in BLE; FROM of joints without swelling or pain. Gait steady, speech clear, no tremor, coordination problem.  Psych: Denies drug/ETOH abuse, no hx. of eating disorders or depression.  Other systems: negative.      Lab Results   Component Value Date    HGBA1C 9.0 (H) 04/27/2017     Lab Results   Component Value Date    TSH 0.804 10/04/2016       Chemistry        Component Value Date/Time     05/15/2017 0504    K 3.7 05/15/2017 0504     05/15/2017 0504    CO2 23 05/15/2017 0504    BUN 6 05/15/2017 0504    CREATININE 0.9 05/15/2017 0504     (H) 05/15/2017 0504        Component Value Date/Time    CALCIUM 8.6 (L) 05/15/2017 0504    ALKPHOS 64 05/15/2017 0504    AST 11 05/15/2017 0504    ALT 9 (L) 05/15/2017 0504    BILITOT 0.3 05/15/2017 0504          Lab Results   Component Value Date    LDLCALC 103 (H) 04/27/2017         PHYSICAL EXAMINATION  Constitutional: Well developed, well nourished, NAD.   Psych: AAOx3, appropriate mood and affect, pleasant expression, conversant, appears relaxed, well groomed.   Eyes: Conjunctiva and corneas clear.  Neck: Supple, trachea midline, FROM, no thyromegaly or nodules, carotid pulses strong, no  bruits.  Respiratory: Resp even and unlabored, CTA bilateral.  Cardiac: RRR, S1, S2 heard, no murmurs; radial pulses +2 bilaterally.   Abdomen: Soft, non-tender, non-distended; +BSs x  4.  Vascular: Same temperature peripherally to centrally, DP pulses +2 bilaterally, no edema.   Neuro: Gait steady.  Skin: Normal skin turgor. Skin warm and dry. + acanthosis nigracans observed. Injection sites with no complications.   Feet: No pressure areas, blisters, ulcers. + calluses bilateral plantar surfaces. Wearing sandals, nails in good condition.     Protective Sensation (w/ 10 gram monofilament):  Right: Intact  Left: Intact    Visual Inspection:  Nails Intact - without Evidence of Foot Deformity- Bilateral and Callus -  Bilateral    Pedal Pulses:   Right: Present  Left: Present    Posterior tibialis:   Right:Present  Left: Present      ASSESSMENT and PLAN:    1. Type 1 diabetes with hyperglycemia-   Discussed diagnosis of DM, progression of disease, long term complications and tx options- including pumps and sensors. Reviewed A1c and BG goals. Reviewed hypoglycemia, s/s and appropriate tx options. Instructed to monitor BG as directed, bring logs/ meter to clinic visits.     -she is on high doses of insulin for T1DM. Labs with RTC to confirm diagnosis as they are not on file. Discussed with patient. Weight based insulin dose at 1 unit/kg would be a TDD of 69 units and she is on 64 units daily. She denies missed doses of insulin. She is however exposing the insulin to heat by leaving it in her car- pretty much daily. Discussed this can affect the absorption of the insulin. Hesitant to increase doses at this time.     Continue current insulin doses: Lantus 40 units daily, humalog 8 units AC with low dose correction scale 150-200 +1, etc  BG monitoring AC/HS. Send logs in 1-2 weeks for review via the portal.   Discard current insulin pens and begin using new pens. Do not leave insulin in the car. Must remain at room  temperature. rx sent to pharmacy. Keep pens not in use in the refrigerator.     DM education- she expresses some interest in learning about insulin pumps. Please discuss options.   Information provided on Dexcom.     Schedule eye exam. Referral placed.     Notify clinic of any hypoglycemia episodes with BG readings < 70, if BG readings consistently run <80 or > 240, or for any BG concerns.       2. Hypoglycemia with T1DM-  Reviewed hypoglycemia s/s and treatment. Glucagon kit rx sent to pharmacy.   Wear medic alert bracelet.  Notify clinic of any hypoglycemia.        3. Obesity-  Body mass index is 30.1 kg/m².  Increases insulin resistance. Discussed DM diet and low CHO snacks at length. Discussed insulin administration at length.         4. Health counseling -  Spent more than 50% of the visit and more than 35 minutes on health counseling.        Orders Placed This Encounter   Procedures    Hemoglobin A1c     Standing Status:   Future     Standing Expiration Date:   9/17/2018    Comprehensive metabolic panel     Standing Status:   Future     Standing Expiration Date:   9/17/2018    TSH     Standing Status:   Future     Standing Expiration Date:   9/17/2018    C-peptide     Standing Status:   Future     Standing Expiration Date:   9/17/2018    Glutamic acid decarboxylase     Standing Status:   Future     Standing Expiration Date:   9/17/2018    Anti-islet cell antibody     Standing Status:   Future     Standing Expiration Date:   9/17/2018    Thyroid peroxidase antibody     Standing Status:   Future     Standing Expiration Date:   9/17/2018    Celiac Disease Panel     Standing Status:   Future     Standing Expiration Date:   9/17/2018    Ambulatory Referral to Diabetes Education     Referral Priority:   Routine     Referral Type:   Consultation     Referral Reason:   Specialty Services Required     Requested Specialty:   Diabetes     Number of Visits Requested:   1    Ambulatory consult to Ophthalmology      Referral Priority:   Routine     Referral Type:   Consultation     Referral Reason:   Specialty Services Required     Requested Specialty:   Ophthalmology     Number of Visits Requested:   1        Return in about 3 months (around 10/19/2017).

## 2017-07-19 NOTE — PATIENT INSTRUCTIONS
Hypoglycemia (Low Blood Sugar)     Fast-acting sugar includes a cup of nonfat milk.     Too little sugar (glucose) in your blood is called hypoglycemia or low blood sugar. Low blood sugar usually means anything lower than 70 mg/dL. Talk with your healthcare provider about your target range and what level is too low for you. Diabetes itself doesnt cause low blood sugar. But some of the treatments for diabetes, such as pills or insulin, may raise your risk for it. Low blood sugar may cause you to pass out or have a seizure. So always treat low blood sugar right away, but don't overeat.  Special note: Always carry a source of fast-acting sugar and a snack in case of hypoglycemia.   What you may notice  If you have low blood sugar, you may have one or more of these symptoms:  · Shakiness or dizziness  · Cold, clammy skin or sweating  · Feelings of hunger  · Headache  · Nervousness  · A hard, fast heartbeat  · Weakness  · Confusion or irritability  · Blurred vision  · Having nightmares or waking up confused or sweating  · Numbness or tingling in the lips or tongue  What you should do  Here are tips to follow if you have hypoglycemia:   · First check your blood sugar. If it is too low (out of your target range), eat or drink 15 to 20 grams of fast-acting sugar. This may be 3 to 4 glucose tablets, 4 ounces (half a cup) of fruit juice or regular (nondiet) soda, 8 ounces (1 cup) of fat-free milk, or 1 tablespoon of honey. Dont take more than this, or your blood sugar may go too high.  · Wait 15 minutes. Then recheck your blood sugar if you can.  · If your blood sugar is still too low, repeat the steps above and check your blood sugar again. If your blood sugar still has not returned to your target range, contact your healthcare provider or seek emergency care.  · Once your blood sugar returns to target range, eat a snack or meal.  Preventing low blood sugar  Things you can do include the following:   · If your condition  needs a strict treatment plan, eat your meals and snacks at the same times each day. Dont skip meals!  · If your treatment plan lets you change when you eat and what you eat, learn how to change the time and dose of your rapid-acting insulin to match this.   · Ask your healthcare provider if it is safe for you to drink alcohol. Never drink on an empty stomach.  · Take your medicine at the prescribed times.  · Always carry a source of fast-acting sugar and a snack when youre away from home.  Other things to do  Additional tips include the following:  · Carry a medical ID card, a compact USB drive, or wear a medical alert bracelet or necklace. It should say that you have diabetes. It should also say what to do if you pass out or have a seizure.  · Make sure your family, friends, and coworkers know the signs of low blood sugar. Tell them what to do if your blood sugar falls very low and you cant treat yourself.  · Keep a glucagon emergency kit handy. Be sure your family, friends, and coworkers know how and when to use it. Check it regularly and replace the glucagon before it expires.  · Talk with your health care team about other things you can do to prevent low blood sugar.     If you have unexplained hypoglycemia or hypoglycemia several times, call your healthcare provider.   Date Last Reviewed: 5/1/2016 © 2000-2016 [employer]. [address] [place] [address], [place] [address]. All rights reserved. This information is not intended as a substitute for professional medical care. Always follow your healthcare professional's instructions.        Understanding Carbohydrates, Fats, and Protein  Food is a source of fuel and nourishment for your body. Its also a source of pleasure. Having diabetes doesnt mean you have to eat special foods or give up desserts. Instead, your dietitian can show you how to plan meals to suit your body. To start, learn how different foods affect blood  sugar.  Carbohydrates  Carbohydrates are the main source of fuel for the body. Carbohydrates raise blood sugar. Many people think carbohydrates are only found in pasta or bread. But carbohydrates are actually in many kinds of foods:  · Sugars occur naturally in foods such as fruit, milk, honey, and molasses. Sugars can also be added to many foods, from cereals and yogurt to candy and desserts. Sugars raise blood sugar.  · Starches are found in bread, cereals, pasta, and dried beans. Theyre also found in corn, peas, potatoes, yam, acorn squash, and butternut squash. Starches also raise blood sugar.   · Fiber is found in foods such as vegetables, fruits, beans, and whole grains. Unlike other carbs, fiber isnt digested or absorbed. So it doesnt raise blood sugar. In fact, fiber can help keep blood sugar from rising too fast. It also helps keep blood cholesterol at a healthy level.  Did you know?  Even though carbohydrates raise blood sugar, its best to have some in every meal. They are an important part of a healthy diet.   Fat  Fat is an energy source that can be stored until needed. Fat does not raise blood sugar. However, it can raise blood cholesterol, increasing the risk of heart disease. Fat is also high in calories, which can cause weight gain. Not all types of fat are the same.  More Healthy:  · Monounsaturated fats are mostly found in vegetable oils, such as olive, canola, and peanut oils. They are also found in avocados and some nuts. Monounsaturated fats are healthy for your heart. Thats because they lower LDL (unhealthy) cholesterol.  · Polyunsaturated fats are mostly found in vegetable oils, such as corn, safflower, and soybean oils. They are also found in some seeds, nuts, and fish. Polyunsaturated fats lower LDL (unhealthy) cholesterol. So, choosing them instead of saturated fats is healthy for your heart. Certain unsaturated fats can help lower triglycerides.   Less Healthy:  · Saturated fats are  found in animal products, such as meat, poultry, whole milk, lard, and butter. Saturated fats raise LDL cholesterol and are not healthy for your heart.  · Hydrogenated oils and trans fats are formed when vegetable oils are processed into solid fats. They are found in many processed foods. Hydrogenated oils and trans fats raise LDL cholesterol and lower HDL (healthy) cholesterol. They are not healthy for your heart.  Protein  Protein helps the body build and repair muscle and other tissue. Protein has little or no effect on blood sugar. However, many foods that contain protein also contain saturated fat. By choosing low-fat protein sources, you can get the benefits of protein without the extra fat:  · Plant protein is found in dry beans and peas, nuts, and soy products, such as tofu and soymilk. These sources tend to be cholesterol-free and low in saturated fat.  · Animal protein is found in fish, poultry, meat, cheese, milk, and eggs. These contain cholesterol and can be high in saturated fat. Aim for lean, lower-fat choices.  Date Last Reviewed: 3/1/2016  © 5007-1204 [employer]. [address] [place] [address]. All rights reserved. This information is not intended as a substitute for professional medical care. Always follow your healthcare professional's instructions.        Diet: Diabetes  Food is an important tool that you can use to control diabetes and stay healthy. Eating well-balanced meals in the correct amounts will help you control your blood glucose levels and prevent low blood sugar reactions. It will also help you reduce the health risks of diabetes. There is no one specific diet that is right for everyone with diabetes. But there are general guidelines to follow. A registered dietitian (RD) will create a tailored diet approach thats just right for you. He or she will also help you plan healthy meals and snacks. If you have any questions, call your dietitian for  advice.    Guidelines for success  Talk with your healthcare provider before starting a diabetes diet or weight loss program. If you haven't talked with a dietitian yet, ask your provider for a referral. The following guidelines can help you succeed:  · Select foods from the 6 food groups below. Your dietitian will help you find food choices within each group. He or she will also show you serving sizes and how many servings you can have at each meal.  ¨ Grains, beans, and starchy vegetables  ¨ Vegetables  ¨ Fruit  ¨ Milk or yogurt  ¨ Meat, poultry, fish, or tofu  ¨ Healthy fats  · Check your blood sugar levels as directed by your provider. Take any medicine as prescribed by your provider.  · Learn to read food labels and pick the right portion sizes.  · Eat only the amount of food in your meal plan. Eat about the same amount of food at regular times each day. Dont skip meals. Eat meals 4 to 5 hours apart, with snacks in between.  · Limit alcohol. It raises blood sugar levels. Drink water or calorie-free diet drinks that use safe sweeteners.  · Eat less fat to help lower your risk of heart disease. Use nonfat or low-fat dairy products and lean meats. Avoid fried foods. Use cooking oils that are unsaturated, such as olive, canola, or peanut oil.  · Talk with your dietitian about safe sugar substitutes.  · Avoid added salt. It can contribute to high blood pressure, which can cause heart disease. People with diabetes already have a risk of high blood pressure and heart disease.  · Stay at a healthy weight. If you need to lose weight, cut down on your portion sizes. But dont skip meals. Exercise is an important part of any weight management program. Talk with your provider about an exercise program thats right for you.  · For more information about the best diet plan for you, talk with a registered dietitian (RD). To find an RD in your area, contact:  ¨ Academy of Nutrition and Dietetics www.eatright.org  ¨ The  American Diabetes Association 919-326-6838 www.diabetes.org  Date Last Reviewed: 8/1/2016  © 3638-1177 [employer] [place]. [address] [place] [address], [place] [address]. All rights reserved. This information is not intended as a substitute for professional medical care. Always follow your healthcare professional's instructions.        Eating Out When You Have Diabetes  Eating right is an important part of keeping your blood sugar in your target range. You just need to make healthy choices.    Tips for restaurant meals  When you eat away from home try these tips:  · Try to schedule your dining-out meal at your normal meal time. Make a reservation if possible, so you don't have to wait to eat. If you can't make a reservation, try to arrive at the restaurant at a less-busy time to cut down your wait time. Eat a small fruit or starch snack at your regular mealtime if your restaurant meal is going to be later than usual.   · Call ahead to see if the restaurant can meet your dietary needs if you've never been there before. Or you can go online to see the menu ahead of time.  · Carry some crackers with you in case the restaurant needs you to wait until you can be served.  · Ask how foods are prepared before you order.  · Instead of fried, sautéed, or breaded foods, choose ones that are broiled, steamed, grilled, or baked.  · Ask for sauces, gravies, and dressings on the side.  · Only eat an amount that fits your meal plan. Remember: You can take home the leftovers.  · Save dessert for special occasions. Then choose a small dessert or share one with a friend or family member.  Make healthy choices  Fast food  · Garden salad with light dressing on the side  · Baked potato with vegetables or herbs  · Broiled, roasted, or grilled chicken sandwich  · Sliced turkey or lean roast beef sandwich  Mexican  · Chicken enchilada, without cheese or sour cream   · Small burrito with whole beans and chicken  · Whole beans (not refried)  and rice  · Chicken or fish fajitas  Steakhouse  · Grilled or broiled lean cuts of beef  · Baked potato with vegetables or herbs  · Broiled or baked chicken. Dont eat the skin.  · Steamed vegetables  Asian  · Steamed dumplings or potstickers  · Broiled, boiled, or steamed meats or fish  · Sushi or sashimi  · Steamed rice or boiled noodles. One serving is equal to 1/3 cup.  Date Last Reviewed: 6/1/2016 © 2000-2016 [employer]. [address] [place] [id number]. All rights reserved. This information is not intended as a substitute for professional medical care. Always follow your healthcare professional's instructions.      Snacks can be an important part of a balanced, healthy meal plan. They allow you to eat more frequently, feeling full and satisfied throughout the day. Also, they allow you to spread carbohydrates evenly, which may stabilize blood sugars.  Plus, snacks are enjoyable!     The amount of carbohydrate needed at snacks varies. Generally, about 15-30 grams of carbohydrate per snack is recommended.  Below you will find some tasty treats.       0-5 gm carb   Crystal Light   Vitamin Water Zero   Herbal tea, unsweetened   2 tsp peanut butter on celery   1./2 cup sugar-free jell-o   1 sugar-free popsicle   ¼ cup blueberries   8oz Blue Vicki unsweetened almond milk   5 baby carrots & celery sticks, cucumbers, bell peppers dipped in ¼ cup salsa, 2Tbsp light ranch dressing or 2Tbsp plain Greek yogurt   10 Goldfish crackers   ½ oz low-fat cheese or string cheese   1 closed handful of nuts, unsalted   1 Tbsp of sunflower seeds, unsalted   1 cup Smart Pop popcorn   1 whole grain brown rice cake        15 gm carb   1 small piece of fruit or ½ banana or 1/2 cup lite canned fruit   3 xochitl cracker squares   3 cups Smart Pop popcorn, top spray butter, Hughes lite salt or cinnamon and Truvia   5 Vanilla Wafers   ½ cup low fat, no added sugar ice cream or frozen yogurt (Blue  perez, Blue Bunny, Weight Watchers, Skinny Cow)   ½ turkey, ham, or chicken sandwich   ½ c fruit with ½ c Cottage cheese   4-6 unsalted wheat crackers with 1 oz low fat cheese or 1 tbsp peanut butter    30-45 goldfish crackers (depending on flavor)    7-8 Tenriism mini brown rice cakes (caramel, apple cinnamon, chocolate)    12 Tenriism mini brown rice cakes (cheddar, bbq, ranch)    1/3 cup hummus dip with raw veg   1/2 whole wheat diane, 1Tbsp hummus   Mini Pizza (1/2 whole wheat English muffin, low-fat  cheese, tomato sauce)   100 calorie snack pack (Oreo, Chips Ahoy, Ritz Mix, Baked Cheetos)   4-6 oz. light or Greek Style yogurt (Chobani, Yoplait, Okios, [place])   ½ cup sugar-free pudding     6 in. wheat tortilla or diane oven toasted chips (topped with spray butter flavoring, cinnamon, Truvia OR spray butter, garlic powder, chili powder)    18 BBQ Popchips (available at [employer], [employer], Fresh Market)

## 2017-08-15 ENCOUNTER — TELEPHONE (OUTPATIENT)
Dept: ENDOCRINOLOGY | Facility: CLINIC | Age: 22
End: 2017-08-15

## 2017-08-15 NOTE — TELEPHONE ENCOUNTER
Called patient and requested Blood Sugar Logs as per Elva Cao, NP- Patient explains that, she had forgotten, but will send today.

## 2017-08-16 ENCOUNTER — PATIENT MESSAGE (OUTPATIENT)
Dept: ENDOCRINOLOGY | Facility: CLINIC | Age: 22
End: 2017-08-16

## 2017-08-17 ENCOUNTER — PATIENT MESSAGE (OUTPATIENT)
Dept: ENDOCRINOLOGY | Facility: CLINIC | Age: 22
End: 2017-08-17

## 2017-09-16 ENCOUNTER — HOSPITAL ENCOUNTER (EMERGENCY)
Facility: OTHER | Age: 22
Discharge: HOME | End: 2017-09-16
Attending: EMERGENCY MEDICINE
Payer: MEDICAID

## 2017-09-16 VITALS
RESPIRATION RATE: 17 BRPM | HEART RATE: 111 BPM | WEIGHT: 154 LBS | TEMPERATURE: 99 F | DIASTOLIC BLOOD PRESSURE: 63 MMHG | OXYGEN SATURATION: 100 % | BODY MASS INDEX: 30.23 KG/M2 | HEIGHT: 60 IN | SYSTOLIC BLOOD PRESSURE: 111 MMHG

## 2017-09-16 DIAGNOSIS — K52.9 GASTROENTERITIS: Primary | ICD-10-CM

## 2017-09-16 LAB
ALBUMIN SERPL-MCNC: 3.7 G/DL (ref 3.3–5.5)
ALBUMIN SERPL-MCNC: 4.1 G/DL (ref 3.3–5.5)
ALP SERPL-CCNC: 76 U/L (ref 42–141)
ALP SERPL-CCNC: 80 U/L (ref 42–141)
B-HCG UR QL: NEGATIVE
BILIRUB SERPL-MCNC: 0.9 MG/DL (ref 0.2–1.6)
BILIRUB SERPL-MCNC: 1 MG/DL (ref 0.2–1.6)
BILIRUBIN, POC UA: NEGATIVE
BLOOD, POC UA: NEGATIVE
BUN SERPL-MCNC: 8 MG/DL (ref 7–22)
CALCIUM SERPL-MCNC: 9.2 MG/DL (ref 8–10.3)
CHLORIDE SERPL-SCNC: 99 MMOL/L (ref 98–108)
CLARITY, POC UA: CLEAR
COLOR, POC UA: YELLOW
CREAT SERPL-MCNC: 0.9 MG/DL (ref 0.6–1.2)
CTP QC/QA: YES
GLUCOSE SERPL-MCNC: 216 MG/DL (ref 73–118)
GLUCOSE, POC UA: NEGATIVE
KETONES, POC UA: ABNORMAL
LEUKOCYTE EST, POC UA: NEGATIVE
NITRITE, POC UA: NEGATIVE
PH UR STRIP: 5.5 [PH]
POC ALT (SGPT): 23 U/L (ref 10–47)
POC ALT (SGPT): 24 U/L (ref 10–47)
POC AMYLASE: 73 U/L (ref 14–97)
POC AST (SGOT): 23 U/L (ref 11–38)
POC AST (SGOT): 24 U/L (ref 11–38)
POC GGT: 13 U/L (ref 5–65)
POC TCO2: 23 MMOL/L (ref 18–33)
POCT GLUCOSE: 219 MG/DL (ref 70–110)
POTASSIUM BLD-SCNC: 3.7 MMOL/L (ref 3.6–5.1)
PROTEIN, POC UA: NEGATIVE
PROTEIN, POC: 7.4 G/DL (ref 6.4–8.1)
PROTEIN, POC: 7.4 G/DL (ref 6.4–8.1)
SODIUM BLD-SCNC: 142 MMOL/L (ref 128–145)
SPECIFIC GRAVITY, POC UA: 1.02
UROBILINOGEN, POC UA: 0.2 E.U./DL

## 2017-09-16 PROCEDURE — 96361 HYDRATE IV INFUSION ADD-ON: CPT

## 2017-09-16 PROCEDURE — 25000003 PHARM REV CODE 250: Performed by: EMERGENCY MEDICINE

## 2017-09-16 PROCEDURE — 80053 COMPREHEN METABOLIC PANEL: CPT

## 2017-09-16 PROCEDURE — 81003 URINALYSIS AUTO W/O SCOPE: CPT

## 2017-09-16 PROCEDURE — 96374 THER/PROPH/DIAG INJ IV PUSH: CPT

## 2017-09-16 PROCEDURE — 85025 COMPLETE CBC W/AUTO DIFF WBC: CPT

## 2017-09-16 PROCEDURE — 63600175 PHARM REV CODE 636 W HCPCS: Performed by: EMERGENCY MEDICINE

## 2017-09-16 PROCEDURE — 82150 ASSAY OF AMYLASE: CPT

## 2017-09-16 PROCEDURE — 81025 URINE PREGNANCY TEST: CPT | Performed by: EMERGENCY MEDICINE

## 2017-09-16 PROCEDURE — 99283 EMERGENCY DEPT VISIT LOW MDM: CPT | Mod: 25

## 2017-09-16 RX ORDER — DIPHENOXYLATE HYDROCHLORIDE AND ATROPINE SULFATE 2.5; .025 MG/1; MG/1
1 TABLET ORAL
Status: COMPLETED | OUTPATIENT
Start: 2017-09-16 | End: 2017-09-16

## 2017-09-16 RX ORDER — DICYCLOMINE HYDROCHLORIDE 20 MG/1
20 TABLET ORAL 2 TIMES DAILY
Qty: 30 TABLET | Refills: 0 | Status: SHIPPED | OUTPATIENT
Start: 2017-09-16 | End: 2017-10-01

## 2017-09-16 RX ORDER — ONDANSETRON 4 MG/1
4 TABLET, ORALLY DISINTEGRATING ORAL EVERY 8 HOURS PRN
Qty: 14 TABLET | Refills: 1 | Status: SHIPPED | OUTPATIENT
Start: 2017-09-16 | End: 2018-07-29

## 2017-09-16 RX ORDER — ONDANSETRON 2 MG/ML
4 INJECTION INTRAMUSCULAR; INTRAVENOUS
Status: COMPLETED | OUTPATIENT
Start: 2017-09-16 | End: 2017-09-16

## 2017-09-16 RX ORDER — DICYCLOMINE HYDROCHLORIDE 10 MG/1
20 CAPSULE ORAL
Status: COMPLETED | OUTPATIENT
Start: 2017-09-16 | End: 2017-09-16

## 2017-09-16 RX ADMIN — ONDANSETRON 4 MG: 2 INJECTION INTRAMUSCULAR; INTRAVENOUS at 09:09

## 2017-09-16 RX ADMIN — DICYCLOMINE HYDROCHLORIDE 20 MG: 10 CAPSULE ORAL at 10:09

## 2017-09-16 RX ADMIN — DIPHENOXYLATE HYDROCHLORIDE AND ATROPINE SULFATE 1 TABLET: 2.5; .025 TABLET ORAL at 09:09

## 2017-09-16 RX ADMIN — SODIUM CHLORIDE 1000 ML: 0.9 INJECTION, SOLUTION INTRAVENOUS at 09:09

## 2017-09-16 NOTE — ED PROVIDER NOTES
Encounter Date: 9/16/2017       History     Chief Complaint   Patient presents with    Emesis     and diarrhea x 2 days, last ate pizza last ngiht and has been vomiting since     Chief complaint: Vomiting and diarrhea  21-year-old has had multiple episodes of vomiting and diarrhea since 10 PM last night.  She has mild cramps to her lower abdomen.  No fever.  No urinary symptoms.  She does report pain to her lower back.  She has not taken anything for the symptoms.  Patient is a diabetic.  She thought her blood sugar was low but it was over 200 here.  No sick contacts or bad food exposures.  There are no aggravating or alleviating factors for her symptoms.          Review of patient's allergies indicates:   Allergen Reactions    Clindamycin Anaphylaxis and Hives     Past Medical History:   Diagnosis Date    Diabetes mellitus type I     Heart murmur     Sickle cell trait      Past Surgical History:   Procedure Laterality Date    cyst removed on buttox  12/2011    DILATION AND CURETTAGE OF UTERUS  05/17/13    PILONIDAL CYST DRAINAGE  2014     Family History   Problem Relation Age of Onset    Diabetes Paternal Grandmother     Hypertension Paternal Grandmother     Asthma Brother     Thyroid disease Mother      Social History   Substance Use Topics    Smoking status: Never Smoker    Smokeless tobacco: Never Used    Alcohol use No     Review of Systems   Constitutional: Negative for fever.   HENT: Negative for sore throat.    Respiratory: Negative for shortness of breath.    Cardiovascular: Negative for chest pain.   Gastrointestinal: Positive for abdominal pain, diarrhea, nausea and vomiting.   Genitourinary: Negative for dysuria.   Musculoskeletal: Positive for back pain.   Skin: Negative for rash.   Neurological: Positive for weakness.   Hematological: Does not bruise/bleed easily.       Physical Exam     Initial Vitals [09/16/17 0905]   BP Pulse Resp Temp SpO2   119/68 110 17 98.7 °F (37.1 °C) 99 %       MAP       85         Physical Exam    Nursing note and vitals reviewed.  Constitutional: She appears well-developed and well-nourished.   HENT:   Head: Normocephalic and atraumatic.   Mouth/Throat: Oropharynx is clear and moist.   Eyes: Conjunctivae and EOM are normal. Pupils are equal, round, and reactive to light.   Neck: Normal range of motion. Neck supple.   Cardiovascular: Normal rate and regular rhythm.   Pulmonary/Chest: Breath sounds normal.   Abdominal: Soft. There is no tenderness. There is no rebound and no guarding.       Musculoskeletal: Normal range of motion.   Neurological: She is alert and oriented to person, place, and time. She has normal strength.   Skin: Skin is warm and dry.   Psychiatric: She has a normal mood and affect.         ED Course   Procedures  Labs Reviewed   POCT GLUCOSE - Abnormal; Notable for the following:        Result Value    POCT Glucose 219 (*)     All other components within normal limits   POCT URINALYSIS W/O SCOPE   POCT URINE PREGNANCY   POCT GLUCOSE   POCT CBC   POCT CMP   POCT AMYLASE             Medical Decision Making:   Initial Assessment:   21-year-old diabetic presents with vomiting and diarrhea since last night.  On exam patient appears well.  Vital signs are stable.  She has mild tenderness to her lower abdomen.  Clinical Tests:   Lab Tests: Ordered and Reviewed       <> Summary of Lab: White blood cell count 12.3, glucose 216, ketonuria, normal bicarbonate  ED Management:  Patient will be given a liter of IV fluids.  CBC, CMP amylase and urine /UPT will be done.  Patient feels better after the medications provided.  She has no evidence of DKA.  She is able to tolerate liquids without vomiting.  Patient was instructed on a clear liquid diet.  She can also take Imodium as needed for the diarrhea.  She will be discharged on Bentyl was given strict return precautions.                   ED Course      Clinical Impression:   The encounter diagnosis was  Gastroenteritis.                           Jolynn Lane MD  09/16/17 1046

## 2017-10-04 ENCOUNTER — HOSPITAL ENCOUNTER (EMERGENCY)
Facility: HOSPITAL | Age: 22
Discharge: HOME | End: 2017-10-04
Attending: EMERGENCY MEDICINE
Payer: MEDICAID

## 2017-10-04 VITALS
TEMPERATURE: 98 F | WEIGHT: 151 LBS | SYSTOLIC BLOOD PRESSURE: 103 MMHG | DIASTOLIC BLOOD PRESSURE: 61 MMHG | OXYGEN SATURATION: 100 % | HEIGHT: 60 IN | RESPIRATION RATE: 18 BRPM | HEART RATE: 70 BPM | BODY MASS INDEX: 29.64 KG/M2

## 2017-10-04 DIAGNOSIS — R73.9 HYPERGLYCEMIA: Primary | ICD-10-CM

## 2017-10-04 DIAGNOSIS — R55 NEAR SYNCOPE: ICD-10-CM

## 2017-10-04 LAB
ALBUMIN SERPL BCP-MCNC: 3.5 G/DL
ALP SERPL-CCNC: 94 U/L
ALT SERPL W/O P-5'-P-CCNC: 13 U/L
ANION GAP SERPL CALC-SCNC: 13 MMOL/L
AST SERPL-CCNC: 13 U/L
BACTERIA #/AREA URNS HPF: ABNORMAL /HPF
BASOPHILS # BLD AUTO: 0.03 K/UL
BASOPHILS NFR BLD: 0.2 %
BILIRUB SERPL-MCNC: 0.5 MG/DL
BILIRUB UR QL STRIP: NEGATIVE
BUN SERPL-MCNC: 11 MG/DL
CALCIUM SERPL-MCNC: 9.9 MG/DL
CHLORIDE SERPL-SCNC: 103 MMOL/L
CLARITY UR: CLEAR
CO2 SERPL-SCNC: 22 MMOL/L
COLOR UR: YELLOW
CREAT SERPL-MCNC: 1 MG/DL
DIFFERENTIAL METHOD: ABNORMAL
EOSINOPHIL # BLD AUTO: 0.2 K/UL
EOSINOPHIL NFR BLD: 1.4 %
ERYTHROCYTE [DISTWIDTH] IN BLOOD BY AUTOMATED COUNT: 13 %
EST. GFR  (AFRICAN AMERICAN): >60 ML/MIN/1.73 M^2
EST. GFR  (NON AFRICAN AMERICAN): >60 ML/MIN/1.73 M^2
GLUCOSE SERPL-MCNC: 326 MG/DL
GLUCOSE UR QL STRIP: ABNORMAL
HCT VFR BLD AUTO: 41 %
HGB BLD-MCNC: 14.6 G/DL
HGB UR QL STRIP: NEGATIVE
KETONES UR QL STRIP: ABNORMAL
LEUKOCYTE ESTERASE UR QL STRIP: NEGATIVE
LYMPHOCYTES # BLD AUTO: 2.3 K/UL
LYMPHOCYTES NFR BLD: 16.4 %
MCH RBC QN AUTO: 28.7 PG
MCHC RBC AUTO-ENTMCNC: 35.6 G/DL
MCV RBC AUTO: 81 FL
MICROSCOPIC COMMENT: ABNORMAL
MONOCYTES # BLD AUTO: 0.6 K/UL
MONOCYTES NFR BLD: 4.3 %
NEUTROPHILS # BLD AUTO: 10.6 K/UL
NEUTROPHILS NFR BLD: 77.7 %
NITRITE UR QL STRIP: NEGATIVE
PH UR STRIP: 6 [PH] (ref 5–8)
PLATELET # BLD AUTO: 334 K/UL
PMV BLD AUTO: 10.2 FL
POCT GLUCOSE: 119 MG/DL (ref 70–110)
POCT GLUCOSE: 166 MG/DL (ref 70–110)
POCT GLUCOSE: 279 MG/DL (ref 70–110)
POCT GLUCOSE: 319 MG/DL (ref 70–110)
POTASSIUM SERPL-SCNC: 3.5 MMOL/L
PROT SERPL-MCNC: 7.3 G/DL
PROT UR QL STRIP: NEGATIVE
RBC # BLD AUTO: 5.08 M/UL
SODIUM SERPL-SCNC: 138 MMOL/L
SP GR UR STRIP: 1.02 (ref 1–1.03)
SQUAMOUS #/AREA URNS HPF: 4 /HPF
URN SPEC COLLECT METH UR: ABNORMAL
UROBILINOGEN UR STRIP-ACNC: NEGATIVE EU/DL
WBC # BLD AUTO: 13.68 K/UL
YEAST URNS QL MICRO: ABNORMAL

## 2017-10-04 PROCEDURE — 80053 COMPREHEN METABOLIC PANEL: CPT

## 2017-10-04 PROCEDURE — 93005 ELECTROCARDIOGRAM TRACING: CPT

## 2017-10-04 PROCEDURE — 99284 EMERGENCY DEPT VISIT MOD MDM: CPT | Mod: 25

## 2017-10-04 PROCEDURE — 81000 URINALYSIS NONAUTO W/SCOPE: CPT

## 2017-10-04 PROCEDURE — 85025 COMPLETE CBC W/AUTO DIFF WBC: CPT

## 2017-10-04 PROCEDURE — 82962 GLUCOSE BLOOD TEST: CPT

## 2017-10-04 PROCEDURE — 93010 ELECTROCARDIOGRAM REPORT: CPT | Mod: ,,, | Performed by: INTERNAL MEDICINE

## 2017-10-04 NOTE — ED NOTES
"Patient presented to the ED with EMS stating that "she woke up late and was rushing, when she became dizzy and weak, so she laid herself on the floor and called EMS." Patient stated she didn't fall out or hit her head, she stated she "felt this way before, so she knew what was going on so she took her insulin right before EMS came." Patient  upon arrival with NAD noted. Patient AAOx3.   "

## 2017-10-04 NOTE — DISCHARGE INSTRUCTIONS
Continue with current regimen for hyperglycemia. Schedule appt to establish primary care physician. Follow-up with endocrinology as scheduled later this month. Return to this ED if any other problems.

## 2017-10-04 NOTE — ED NOTES
Pts family member agitated asking what we are still waiting on. Informed awaiting results of urine sample.

## 2017-10-04 NOTE — ED PROVIDER NOTES
Encounter Date: 10/4/2017    SCRIBE #1 NOTE: I, Shreyascliff Willam, am scribing for, and in the presence of,  Uriel Oliver PA-C. I have scribed the following portions of the note - Other sections scribed: HPI, ROS.       History     Chief Complaint   Patient presents with    Loss of Consciousness     Passed out at home.  States she woke up late and was rushing to get done when she stood up, felt dizzy/lightheaded, then passed out.  states this has happened before.  Pt is DM 1.  took 40 units of Lantus and 5 units of Humalog approx 45 min ago.  EMS  mg/dl per EMS     CC: Lightheadedness    23 y/o female with Diabetes mellitus type I, Heart murmur, and Sickle cell trait presents to the ED via EMS c/o acute onset lightheadedness that started PTA. The patient states that she felt lightheaded while pulling her son out of the bath tub, so she checked her CBG, which was 550. The patient reports that she felt like she was going to pass out, so she called EMS and took her insulin while she waited.  The patient denies LOC, chest pain, SOB, N/V/D, vaginal pain, vaginal d/c, or vaginal bleeding. No other symptoms reported.  No alleviating or exacerbating factors.  Symptoms constant.    The patient has an appt with her endocrinologist this month.      The history is provided by the patient. No  was used.     Review of patient's allergies indicates:   Allergen Reactions    Clindamycin Anaphylaxis and Hives     Past Medical History:   Diagnosis Date    Diabetes mellitus type I     Heart murmur     Sickle cell trait      Past Surgical History:   Procedure Laterality Date    cyst removed on buttox  12/2011    DILATION AND CURETTAGE OF UTERUS  05/17/13    PILONIDAL CYST DRAINAGE  2014     Family History   Problem Relation Age of Onset    Diabetes Paternal Grandmother     Hypertension Paternal Grandmother     Asthma Brother     Thyroid disease Mother      Social History   Substance Use Topics     Smoking status: Never Smoker    Smokeless tobacco: Never Used    Alcohol use No     Review of Systems   Constitutional: Negative for chills and fever.   HENT: Negative for rhinorrhea.    Eyes: Negative for redness.   Respiratory: Negative for cough and shortness of breath.    Cardiovascular: Negative for chest pain.   Gastrointestinal: Negative for diarrhea, nausea and vomiting.   Genitourinary: Negative for difficulty urinating, dysuria, vaginal bleeding, vaginal discharge and vaginal pain.   Musculoskeletal: Negative for back pain.   Skin: Negative for rash.   Neurological: Positive for light-headedness. Negative for headaches.        (-) LOC       Physical Exam     Initial Vitals [10/04/17 1345]   BP Pulse Resp Temp SpO2   107/65 86 16 98.2 °F (36.8 °C) 99 %      MAP       79         Physical Exam    Nursing note and vitals reviewed.  Constitutional: She appears well-developed and well-nourished. She is not diaphoretic. No distress.   HENT:   Head: Normocephalic and atraumatic.   Eyes: Conjunctivae and EOM are normal. Pupils are equal, round, and reactive to light.   Neck: Normal range of motion. Neck supple. No tracheal deviation present.   Cardiovascular: Normal heart sounds.   Pulmonary/Chest: Breath sounds normal. No stridor. No respiratory distress. She has no wheezes. She has no rhonchi. She exhibits no tenderness.   Abdominal: Soft. Bowel sounds are normal. She exhibits no distension and no mass. There is no tenderness. There is no rebound and no guarding.   Musculoskeletal: Normal range of motion. She exhibits no tenderness.   Lymphadenopathy:     She has no cervical adenopathy.   Neurological: She is alert and oriented to person, place, and time.   Skin: Skin is warm and dry. Capillary refill takes less than 2 seconds.   Psychiatric: She has a normal mood and affect. Her behavior is normal. Judgment and thought content normal.         ED Course   Procedures  Labs Reviewed   CBC W/ AUTO DIFFERENTIAL  - Abnormal; Notable for the following:        Result Value    WBC 13.68 (*)     MCV 81 (*)     Gran # 10.6 (*)     Gran% 77.7 (*)     Lymph% 16.4 (*)     All other components within normal limits   COMPREHENSIVE METABOLIC PANEL - Abnormal; Notable for the following:     CO2 22 (*)     Glucose 326 (*)     All other components within normal limits   POCT GLUCOSE - Abnormal; Notable for the following:     POCT Glucose 319 (*)     All other components within normal limits   POCT GLUCOSE - Abnormal; Notable for the following:     POCT Glucose 279 (*)     All other components within normal limits   POCT GLUCOSE - Abnormal; Notable for the following:     POCT Glucose 166 (*)     All other components within normal limits   URINALYSIS             Medical Decision Making:   Initial Assessment:   21-year-old female with type 1 diabetes with chief complaint near syncopal episode, hyperglycemia at home.  Differential Diagnosis:   Hyperglycemia, DKA, HHS  Clinical Tests:   Lab Tests: Ordered and Reviewed  Medical Tests: Ordered and Reviewed  ED Management:  Patient overall well-appearing, in no acute distress, afebrile, vitals within normal limits.    Patient presents to ED complaining of near-syncopal episode at home today.  She states she took her blood sugar which was approximately 550.  She did give herself her long-acting and regular insulin injections prior to EMS arrival.  Initial glucose was greater than 300, which has been trending down.  Patient denies complaints at this time.  Physical exam is unremarkable.  CBC without evidence of significant infection or anemia.  CMP without acute slight abnormality.  No kidney insult.  No transaminitis.  Anion gap not significantly elevated.  Urinalysis with trace ketones.  Patient is alert and oriented ×4.  GCS 15.  No compelling evidence to diagnosed DKA at this time.  EKG in normal sinus rhythm, ventricular rate 71 bpm, no evidence of ischemia, arrhythmia, or heart  block.    Patient is comfortable, without complaints.  I do feel she is safe and stable for discharge without further intervention.  Upon further questioning, patient states that she woke up late and did not give herself her typical scheduled medicines.  I've asked her to continue with appropriate regimen for hyperglycemia, and to return to this ED if any other problems occur.  I've given her information to establish primary care physician in the absence of Dr. Shanks.  She has an appointment with endocrinology later this month.  Return precautions given.  Other:   I have discussed this case with another health care provider.       <> Summary of the Discussion: I have discussed this case with Dr. Tovar.            Scribe Attestation:   Scribe #1: I performed the above scribed service and the documentation accurately describes the services I performed. I attest to the accuracy of the note.    Attending Attestation:           Physician Attestation for Scribe:  Physician Attestation Statement for Scribe #1: I, Uriel Oliver PA-C, reviewed documentation, as scribed by Zachary Quarles in my presence, and it is both accurate and complete.                 ED Course      Clinical Impression:   The primary encounter diagnosis was Hyperglycemia. A diagnosis of Near syncope was also pertinent to this visit.    Disposition:   Disposition: Discharged  Condition: Stable                        Uriel Rosa PA-C  10/04/17 9314

## 2017-10-11 ENCOUNTER — HOSPITAL ENCOUNTER (EMERGENCY)
Facility: OTHER | Age: 22
Discharge: HOME | End: 2017-10-11
Attending: EMERGENCY MEDICINE
Payer: MEDICAID

## 2017-10-11 VITALS
HEART RATE: 96 BPM | TEMPERATURE: 98 F | DIASTOLIC BLOOD PRESSURE: 66 MMHG | OXYGEN SATURATION: 98 % | RESPIRATION RATE: 20 BRPM | SYSTOLIC BLOOD PRESSURE: 132 MMHG | HEIGHT: 64 IN | WEIGHT: 151 LBS | BODY MASS INDEX: 25.78 KG/M2

## 2017-10-11 DIAGNOSIS — N30.01 ACUTE CYSTITIS WITH HEMATURIA: Primary | ICD-10-CM

## 2017-10-11 LAB
B-HCG UR QL: NEGATIVE
BILIRUBIN, POC UA: NEGATIVE
BLOOD, POC UA: ABNORMAL
CLARITY, POC UA: ABNORMAL
COLOR, POC UA: YELLOW
CTP QC/QA: YES
GLUCOSE, POC UA: ABNORMAL
KETONES, POC UA: NEGATIVE
LEUKOCYTE EST, POC UA: ABNORMAL
NITRITE, POC UA: NEGATIVE
PH UR STRIP: 7 [PH]
POCT GLUCOSE: 339 MG/DL (ref 70–110)
PROTEIN, POC UA: ABNORMAL
SPECIFIC GRAVITY, POC UA: 1.01
UROBILINOGEN, POC UA: 0.2 E.U./DL

## 2017-10-11 PROCEDURE — 81025 URINE PREGNANCY TEST: CPT | Performed by: EMERGENCY MEDICINE

## 2017-10-11 PROCEDURE — 87086 URINE CULTURE/COLONY COUNT: CPT

## 2017-10-11 PROCEDURE — 87186 SC STD MICRODIL/AGAR DIL: CPT

## 2017-10-11 PROCEDURE — 87088 URINE BACTERIA CULTURE: CPT

## 2017-10-11 PROCEDURE — 81003 URINALYSIS AUTO W/O SCOPE: CPT

## 2017-10-11 PROCEDURE — 99283 EMERGENCY DEPT VISIT LOW MDM: CPT | Mod: 25

## 2017-10-11 PROCEDURE — 87077 CULTURE AEROBIC IDENTIFY: CPT

## 2017-10-11 RX ORDER — SULFAMETHOXAZOLE AND TRIMETHOPRIM 800; 160 MG/1; MG/1
1 TABLET ORAL 2 TIMES DAILY
Qty: 14 TABLET | Refills: 0 | Status: SHIPPED | OUTPATIENT
Start: 2017-10-11 | End: 2017-10-18

## 2017-10-11 NOTE — ED PROVIDER NOTES
"Encounter Date: 10/11/2017       History     Chief Complaint   Patient presents with    Abdominal Pain     patient reports "my stomach and my back hurts and it hurts when I pee"    Back Pain    Dysuria     The history is provided by the patient. No  was used.   Female  Problem   Primary symptoms include pelvic pain, dysuria.    Primary symptoms include no discharge, no fever, no dyspareunia, no genital lesions, no genital pain, no genital rash, no genital itching, no genital odor, no vaginal bleeding.   This is a new problem. The current episode started two days ago. The problem occurs constantly. The problem has been gradually worsening. The symptoms occur during urination. She is not pregnant. Pertinent negatives include no anorexia, no diaphoresis, no abdominal swelling, no abdominal pain, no constipation, no diarrhea, no nausea, no vomiting, no frequency, no light-headedness and no dizziness. She has tried nothing for the symptoms. There is no concern regarding sexually transmitted diseases.     Review of patient's allergies indicates:   Allergen Reactions    Clindamycin Anaphylaxis and Hives     Past Medical History:   Diagnosis Date    Diabetes mellitus type I     Heart murmur     Sickle cell trait      Past Surgical History:   Procedure Laterality Date    cyst removed on buttox  12/2011    DILATION AND CURETTAGE OF UTERUS  05/17/13    PILONIDAL CYST DRAINAGE  2014     Family History   Problem Relation Age of Onset    Diabetes Paternal Grandmother     Hypertension Paternal Grandmother     Asthma Brother     Thyroid disease Mother      Social History   Substance Use Topics    Smoking status: Never Smoker    Smokeless tobacco: Never Used    Alcohol use No     Review of Systems   Constitutional: Negative.  Negative for diaphoresis and fever.   HENT: Negative.  Negative for sore throat.    Eyes: Negative.    Respiratory: Negative.  Negative for shortness of breath.  "   Cardiovascular: Negative.  Negative for chest pain.   Gastrointestinal: Negative.  Negative for abdominal pain, anorexia, constipation, diarrhea, nausea and vomiting.   Genitourinary: Positive for dysuria and pelvic pain. Negative for dyspareunia, frequency and vaginal bleeding.   Musculoskeletal: Positive for back pain.   Skin: Negative.  Negative for rash.   Allergic/Immunologic: Negative.    Neurological: Negative.  Negative for dizziness, weakness and light-headedness.   Hematological: Does not bruise/bleed easily.   Psychiatric/Behavioral: Negative.    All other systems reviewed and are negative.      Physical Exam     Initial Vitals [10/11/17 1709]   BP Pulse Resp Temp SpO2   132/66 96 20 97.9 °F (36.6 °C) 98 %      MAP       88         Physical Exam    Nursing note and vitals reviewed.  Constitutional: She appears well-developed and well-nourished. She is not diaphoretic.  Non-toxic appearance. She does not appear ill. No distress.   HENT:   Head: Normocephalic and atraumatic.   Eyes: Conjunctivae are normal. Right eye exhibits no discharge. Left eye exhibits no discharge.   Neck: Normal range of motion.   Cardiovascular: Normal rate, regular rhythm, normal heart sounds and intact distal pulses. Exam reveals no gallop and no friction rub.    No murmur heard.  Pulmonary/Chest: Breath sounds normal. No respiratory distress. She has no wheezes. She has no rhonchi. She has no rales. She exhibits no tenderness.   Abdominal: Soft. Bowel sounds are normal. She exhibits no distension and no mass. There is no hepatosplenomegaly. There is tenderness in the suprapubic area. There is no rebound, no guarding and no CVA tenderness. No hernia.   Musculoskeletal: Normal range of motion.   5/5 motor strength BLE with intact sensation  Negative SLR BLE  Normal heel/toe BLE  2+ reflexes BLE  No bony tenderness  No s/s cauda equina     Neurological: She is alert and oriented to person, place, and time.   Skin: Skin is warm and  dry. No rash noted.   Psychiatric: She has a normal mood and affect. Her behavior is normal. Judgment and thought content normal.         ED Course   Procedures  Labs Reviewed   POCT URINALYSIS W/O SCOPE - Abnormal; Notable for the following:        Result Value    Glucose, UA 2+ (*)     Bilirubin, UA Negative (*)     Ketones, UA Negative (*)     Blood, UA 3+ (*)     Protein, UA 2+ (*)     Nitrite, UA Negative (*)     Leukocytes, UA 2+ (*)     All other components within normal limits   POCT GLUCOSE - Abnormal; Notable for the following:     POCT Glucose 339 (*)     All other components within normal limits   CULTURE, URINE   POCT URINE PREGNANCY   POCT URINALYSIS W/O SCOPE   POCT GLUCOSE             Medical Decision Making:   Initial Assessment:   Acute cystitis  Differential Diagnosis:   Dysuria, gastritis, abdominal pain  Clinical Tests:   Lab Tests: Ordered and Reviewed  Radiological Study: Ordered and Reviewed  ED Management:  The patient's will be discharged home on Bactrim with instructions to drink plenty of fluids, monitor her blood glucose levels closely, take her insulin as prescribed, follow with her primary care provider tomorrow and return to the ER as needed if symptoms worsen or fail to improve.  The patient verbalized an understanding of discharge instructions and treatment plan.              Attending Attestation:     Physician Attestation Statement for NP/PA:   I discussed this assessment and plan of this patient with the NP/PA, but I did not personally examine the patient. The face to face encounter was performed by the NP/PA.    Other NP/PA Attestation Additions:      Medical Decision Making: Urinalysis shows +2 leukocyte esterase and +3 blood.  Agree with plan to treat with antibiotics for urinary tract infection.                 ED Course      Clinical Impression:   The primary encounter diagnosis was Acute cystitis with hematuria. A diagnosis of Uncontrolled type 2 diabetes mellitus without  complication, with long-term current use of insulin was also pertinent to this visit.                           Toussaint Battley III, VINCENT  10/12/17 1604       Elva Mendoza MD  10/12/17 5963

## 2017-10-13 LAB — BACTERIA UR CULT: NORMAL

## 2017-10-17 ENCOUNTER — LAB VISIT (OUTPATIENT)
Dept: LAB | Facility: HOSPITAL | Age: 22
End: 2017-10-17
Attending: NURSE PRACTITIONER
Payer: MEDICAID

## 2017-10-17 DIAGNOSIS — E10.65 TYPE 1 DIABETES MELLITUS WITH HYPERGLYCEMIA: ICD-10-CM

## 2017-10-17 LAB
ALBUMIN SERPL BCP-MCNC: 3.5 G/DL
ALP SERPL-CCNC: 85 U/L
ALT SERPL W/O P-5'-P-CCNC: 45 U/L
ANION GAP SERPL CALC-SCNC: 7 MMOL/L
AST SERPL-CCNC: 20 U/L
BILIRUB SERPL-MCNC: 0.5 MG/DL
BUN SERPL-MCNC: 9 MG/DL
C PEPTIDE SERPL-MCNC: <0.08 NG/ML
CALCIUM SERPL-MCNC: 9.6 MG/DL
CHLORIDE SERPL-SCNC: 103 MMOL/L
CO2 SERPL-SCNC: 27 MMOL/L
CREAT SERPL-MCNC: 1 MG/DL
EST. GFR  (AFRICAN AMERICAN): >60 ML/MIN/1.73 M^2
EST. GFR  (NON AFRICAN AMERICAN): >60 ML/MIN/1.73 M^2
GLUCOSE SERPL-MCNC: 257 MG/DL
POTASSIUM SERPL-SCNC: 4.8 MMOL/L
PROT SERPL-MCNC: 7.5 G/DL
SODIUM SERPL-SCNC: 137 MMOL/L
THYROPEROXIDASE IGG SERPL-ACNC: <6 IU/ML
TSH SERPL DL<=0.005 MIU/L-ACNC: 1.04 UIU/ML

## 2017-10-17 PROCEDURE — 83036 HEMOGLOBIN GLYCOSYLATED A1C: CPT

## 2017-10-17 PROCEDURE — 36415 COLL VENOUS BLD VENIPUNCTURE: CPT

## 2017-10-17 PROCEDURE — 84443 ASSAY THYROID STIM HORMONE: CPT

## 2017-10-17 PROCEDURE — 80053 COMPREHEN METABOLIC PANEL: CPT

## 2017-10-17 PROCEDURE — 86376 MICROSOMAL ANTIBODY EACH: CPT

## 2017-10-17 PROCEDURE — 83516 IMMUNOASSAY NONANTIBODY: CPT | Mod: 59

## 2017-10-17 PROCEDURE — 84681 ASSAY OF C-PEPTIDE: CPT

## 2017-10-17 PROCEDURE — 86341 ISLET CELL ANTIBODY: CPT | Mod: 91

## 2017-10-17 PROCEDURE — 86341 ISLET CELL ANTIBODY: CPT

## 2017-10-18 ENCOUNTER — OFFICE VISIT (OUTPATIENT)
Dept: ENDOCRINOLOGY | Facility: CLINIC | Age: 22
End: 2017-10-18
Payer: MEDICAID

## 2017-10-18 VITALS
BODY MASS INDEX: 25.35 KG/M2 | DIASTOLIC BLOOD PRESSURE: 72 MMHG | HEART RATE: 83 BPM | HEIGHT: 64 IN | SYSTOLIC BLOOD PRESSURE: 119 MMHG | WEIGHT: 148.5 LBS

## 2017-10-18 DIAGNOSIS — N30.01 ACUTE CYSTITIS WITH HEMATURIA: ICD-10-CM

## 2017-10-18 DIAGNOSIS — E78.5 DYSLIPIDEMIA: ICD-10-CM

## 2017-10-18 DIAGNOSIS — E10.65 TYPE 1 DIABETES MELLITUS WITH HYPERGLYCEMIA: Primary | ICD-10-CM

## 2017-10-18 DIAGNOSIS — E66.3 OVERWEIGHT (BMI 25.0-29.9): ICD-10-CM

## 2017-10-18 DIAGNOSIS — E11.649 HYPOGLYCEMIA ASSOCIATED WITH DIABETES: ICD-10-CM

## 2017-10-18 LAB
CREAT UR-MCNC: 91 MG/DL
ESTIMATED AVG GLUCOSE: 180 MG/DL
GLIADIN PEPTIDE IGA SER-ACNC: 10 UNITS
GLIADIN PEPTIDE IGG SER-ACNC: 2 UNITS
HBA1C MFR BLD HPLC: 7.9 %
IGA SERPL-MCNC: 247 MG/DL
MICROALBUMIN UR DL<=1MG/L-MCNC: <2.5 UG/ML
MICROALBUMIN/CREATININE RATIO: NORMAL UG/MG
TTG IGA SER IA-ACNC: 5 UNITS
TTG IGG SER IA-ACNC: 4 UNITS

## 2017-10-18 PROCEDURE — 99214 OFFICE O/P EST MOD 30 MIN: CPT | Mod: PBBFAC | Performed by: NURSE PRACTITIONER

## 2017-10-18 PROCEDURE — 82570 ASSAY OF URINE CREATININE: CPT

## 2017-10-18 PROCEDURE — 99214 OFFICE O/P EST MOD 30 MIN: CPT | Mod: S$PBB,,, | Performed by: NURSE PRACTITIONER

## 2017-10-18 PROCEDURE — 99999 PR PBB SHADOW E&M-EST. PATIENT-LVL IV: CPT | Mod: PBBFAC,,, | Performed by: NURSE PRACTITIONER

## 2017-10-18 RX ORDER — INSULIN LISPRO 100 [IU]/ML
INJECTION, SOLUTION INTRAVENOUS; SUBCUTANEOUS
Qty: 1 BOX | Refills: 6 | Status: SHIPPED | OUTPATIENT
Start: 2017-10-18 | End: 2018-02-05 | Stop reason: SDUPTHER

## 2017-10-18 RX ORDER — INSULIN GLARGINE 100 [IU]/ML
INJECTION, SOLUTION SUBCUTANEOUS
Qty: 1 BOX | Refills: 6 | Status: SHIPPED | OUTPATIENT
Start: 2017-10-18 | End: 2018-02-05 | Stop reason: SDUPTHER

## 2017-10-18 NOTE — PROGRESS NOTES
CC: This 22 y.o. AA female presents for management of T1DM and review of chronic medical conditions.     HPI:   Diagnosed with T1DM in . Reports a syncopal episode at the time of diagnosis and BG readings were HIGH.   + reports she has been hospitalized for hyperglycemia with BG readings in the 600's during her pregnancy. She reports she was off insulin at that time and on oral medications.   Denies hx of DKA though.      She was seen last in clinic by JOANN Cao in may of 2017. She has not been seen in Endocrine since. Now .  Reports her PCP has been managing her BG. She does not plan to get pregnant at this time as she has the Mirena.     She has had 3 ER visits this year so far with 1 admission for acute febrile illness in May 2017. She went to the ER 2017 due to almost fainting at home.  Waiting for her thyroid antibody test, islet cell antibody test, and TARIQ.  Last seen by Elva Cao NP in 2017 and is now being seen by me again today.    She does have a glucagon kit at home and family and friends know how to use it. She has a medic alert bracelet.     CURRENT DM MEDS:  Lantus 40 units daily, humalog 12 units AC with low dose correction scale 150-200 +1, etc. Unless she eats a light meal and she does 6-8 units.   Uses Insulin pens, does not leave in the car anymore.     Pt denies missed doses. Reports sometimes she is taking after it before she eats.     Pt is monitoring BG at home 3-4 times a day. Sometimes more. Has been checking 2 hours after she gives insulin to ensure her BG is decreasing. She feels as if her meter is not working correctly.   Jay Schmid brought glucometer to clinic today revealing the following BG readings:  30 day average- 269  14 day avg- 269   7 day avg- 269  Time and date is inaccurate in her meter.   Variable readings found on review- anywhere from     Feels s/s of hypoglycemia when BG <120's. Yesterday she felt hypoglycemia with . Treats with  food or juice. Doesn't have hypoglycemia often.    She is afraid that her BG will drop at night so she is having decreased hours of sleep due to anxiety.      DIET/ MEAL PATTERN: eats more towards the end of the day after work.   Eats 2 meals a day.   Drinks mostly water.   Reports she rarely snacks.      EXERCISE: walks everyday. Walks the stairs.     Works at a hotel during the day.     STANDARDS OF CARE:   Eye exam: 2014  Foot exam: none   ASA: none  Statin: none  ACE-I/ARB: none    ROS:   Gen: Appetite good, no weight gain or loss, denies fatigue and weakness.  Skin: Skin is intact and heals well, no rashes, pruritis, easy bruising, no hair changes, no intolerance to heat/cold.  Eyes: + blurry vision   Resp: no SOB or HARRIS, no cough  Cardiac: No palpitations, chest pain, denies syncope, weakness, no edema or cyanosis.  GI: No nausea or vomiting, diarrhea, constipation, or abdominal pain.  /GYN: No nocturia, no urinary frequency, burning or pain.   PVD: No leg pain with or without exercise, denies cyanosis, pallor, or cold extremities.  MS/Neuro: Denies numbness/ tingling in BLE; FROM of joints without swelling or pain. Gait steady, speech clear, no tremor, coordination problem.  Psych: Denies drug/ETOH abuse, no hx. of eating disorders or depression.  Other systems: negative.      Lab Results   Component Value Date    HGBA1C 7.9 (H) 10/17/2017     Lab Results   Component Value Date    TSH 1.045 10/17/2017       Chemistry        Component Value Date/Time     10/17/2017 1108    K 4.8 10/17/2017 1108     10/17/2017 1108    CO2 27 10/17/2017 1108    BUN 9 10/17/2017 1108    CREATININE 1.0 10/17/2017 1108     (H) 10/17/2017 1108        Component Value Date/Time    CALCIUM 9.6 10/17/2017 1108    ALKPHOS 85 10/17/2017 1108    AST 20 10/17/2017 1108    ALT 45 (H) 10/17/2017 1108    BILITOT 0.5 10/17/2017 1108          Lab Results   Component Value Date    LDLCALC 103 (H) 04/27/2017         PHYSICAL  EXAMINATION  Constitutional: Well developed, well nourished, NAD.   Psych: AAOx3, appropriate mood and affect, pleasant expression, conversant, appears relaxed, well groomed.   Eyes: Conjunctiva and corneas clear.  Neck: Supple, trachea midline, FROM, no thyromegaly or nodules, carotid pulses strong, no bruits.  Respiratory: Resp even and unlabored, CTA bilateral.  Cardiac: RRR, S1, S2 heard, no murmurs; radial pulses +2 bilaterally.   Abdomen: Soft, non-tender, non-distended; +BSs x  4.  Vascular: Same temperature peripherally to centrally, DP pulses +2 bilaterally, no edema.   Neuro: Gait steady.  Skin: Normal skin turgor. Skin warm and dry. + acanthosis nigracans observed. Injection sites with no complications.   Feet: No pressure areas, blisters, ulcers. + calluses bilateral plantar surfaces. Wearing sandals, nails in good condition.    Foot exam deffered- done 05/2017.      ASSESSMENT and PLAN:  1. Type 1 diabetes mellitus with hyperglycemia  Hemoglobin A1c improved     Microalbumin/creatinine urine ratio today  Education referral today  Opthalmology referral today    Encouraged patient to change her Lantus 40 units dose to the morning instead of at night to decrease anxiety and decreased sleep at night.   Glucagon kit at home and up to date.     Changed her Humalog dose to 10-10-10 with a correction at 150/+2 units.  Unless she eats something small then decrease humalog dose to 8 units.     Discussed diet and exercise improvements.   Encouraged patient to get a new meter since she feels as if her meter is not working correctly.   Discussed foot care today.      2. Hypoglycemia associated with diabetes  See above    3. Dyslipidemia  Encouraged low fat diet with less fried foods.   Not interested in a statin at this time.   4. Overweight (BMI 25.0-29.9)  Encouraged improvements with diet and exercise.    5.  Acute cystitis  Follow up with PCP from ER visit.         Orders Placed This Encounter   Procedures     Hemoglobin A1c     Standing Status:   Future     Standing Expiration Date:   12/17/2018    Microalbumin/creatinine urine ratio     Order Specific Question:   Specimen Source     Answer:   Urine        Return in about 3 months (around 1/18/2018).

## 2017-10-18 NOTE — PATIENT INSTRUCTIONS
Snacks can be an important part of a balanced, healthy meal plan. They allow you to eat more frequently, feeling full and satisfied throughout the day. Also, they allow you to spread carbohydrates evenly, which may stabilize blood sugars.  Plus, snacks are enjoyable!     The amount of carbohydrate needed at snacks varies. Generally, about 15-30 grams of carbohydrate per snack is recommended.  Below you will find some tasty treats.       0-5 gm carb   Crystal Light   Vitamin Water Zero   Herbal tea, unsweetened   2 tsp peanut butter on celery   1./2 cup sugar-free jell-o   1 sugar-free popsicle   ¼ cup blueberries   8oz Blue Vicki unsweetened almond milk   5 baby carrots & celery sticks, cucumbers, bell peppers dipped in ¼ cup salsa, 2Tbsp light ranch dressing or 2Tbsp plain Greek yogurt   10 Goldfish crackers   ½ oz low-fat cheese or string cheese   1 closed handful of nuts, unsalted   1 Tbsp of sunflower seeds, unsalted   1 cup Smart Pop popcorn   1 whole grain brown rice cake        15 gm carb   1 small piece of fruit or ½ banana or 1/2 cup lite canned fruit   3 xochitl cracker squares   3 cups Smart Pop popcorn, top spray butter, Hughes lite salt or cinnamon and Truvia   5 Vanilla Wafers   ½ cup low fat, no added sugar ice cream or frozen yogurt (Blue bell, Blue Bunny, Weight Watchers, Skinny Cow)   ½ turkey, ham, or chicken sandwich   ½ c fruit with ½ c Cottage cheese   4-6 unsalted wheat crackers with 1 oz low fat cheese or 1 tbsp peanut butter    30-45 goldfish crackers (depending on flavor)    7-8 Denominational mini brown rice cakes (caramel, apple cinnamon, chocolate)    12 Denominational mini brown rice cakes (cheddar, bbq, ranch)    1/3 cup hummus dip with raw veg   1/2 whole wheat diane, 1Tbsp hummus   Mini Pizza (1/2 whole wheat English muffin, low-fat  cheese, tomato sauce)   100 calorie snack pack (Oreo, Chips Ahoy, Ritz Mix, Baked Cheetos)   4-6 oz. light or Greek Style yogurt  (Rosalia Cardona, Keith, [facility])   ½ cup sugar-free pudding     6 in. wheat tortilla or diane oven toasted chips (topped with spray butter flavoring, cinnamon, Truvia OR spray butter, garlic powder, chili powder)    18 BBQ Popchips (available at [employer], Whole Foods, Fresh Market)                   Diabetes Support Group Meetings    Date Topics   February 9 Health Promotion/Nutrition   March 9 Taking Care of Your Kidneys   April 13 Taking Care of Your Feet   May 11 Ease Your Mind with Diabetes   June 8 Hurricane and Emergency Preparedness   July 13 Family & Caregiver Support for Diabetes   *August 17  Taking Care of Your Eyes   September 14 Devices & Technology   October 12 Recipes & Treats   November 9 Getting Pumped Up for Diabetes   December 14 Year-End Close Out            Meetings are held in the [facility] (A) of the [facility] located at [address] [place] [address]. Please call [phone] for additional information.    Free service, offered every 2nd Thursday of every month, except in August! Family members and/or friends are welcome as well!  Support group is for patients with type 1 or type 2 diabetes.    From 3:30p to 4:30p        CHANGES TO MEDICATIONS:   Lantus 40 units in AM  Humalog 10-10-10 with Correction at 150/ +2

## 2017-10-19 DIAGNOSIS — E10.9 TYPE 1 DIABETES MELLITUS NOT AT GOAL: ICD-10-CM

## 2017-10-19 LAB — GAD65 AB SER-SCNC: 154 NMOL/L

## 2017-10-19 RX ORDER — INSULIN PUMP SYRINGE, 3 ML
1 EACH MISCELLANEOUS DAILY
Qty: 1 EACH | Refills: 0 | Status: SHIPPED | OUTPATIENT
Start: 2017-10-19 | End: 2018-02-05

## 2017-10-19 RX ORDER — INSULIN PUMP SYRINGE, 3 ML
EACH MISCELLANEOUS
COMMUNITY
End: 2017-10-19 | Stop reason: SDUPTHER

## 2017-10-19 RX ORDER — LANCETS
EACH MISCELLANEOUS
Qty: 200 EACH | Refills: 11 | Status: SHIPPED | OUTPATIENT
Start: 2017-10-19 | End: 2018-02-05

## 2017-10-19 NOTE — TELEPHONE ENCOUNTER
----- Message from Xiomara Bright sent at 10/19/2017  8:31 AM CDT -----  Contact: Pt   [phone]  Gilda   -  Pt calling to get an refill on test scripts , pt is requesting [facility] located on [facility] as the pharmacy  , please call the pt back to confirm pillo back number [phone]  thanks

## 2017-10-20 LAB — PANC ISLET CELL IGG SER-ACNC: ABNORMAL

## 2017-11-02 ENCOUNTER — TELEPHONE (OUTPATIENT)
Dept: ENDOCRINOLOGY | Facility: CLINIC | Age: 22
End: 2017-11-02

## 2017-11-02 NOTE — TELEPHONE ENCOUNTER
----- Message from Xiomara Bright sent at 11/2/2017 11:55 AM CDT -----  Contact: Patient  [phone]  Gilda  -   Patient   Stated that she is running out of test strips and needs an refill ,  Pt test at least six times a day , pt will not have enough to last through the day call back number [phone]

## 2017-11-28 ENCOUNTER — TELEPHONE (OUTPATIENT)
Dept: ENDOCRINOLOGY | Facility: CLINIC | Age: 22
End: 2017-11-28

## 2017-11-28 NOTE — TELEPHONE ENCOUNTER
----- Message from Julia Francis sent at 11/28/2017  1:38 PM CST -----  Contact: Yeimi / [employer] / [phone] ext [phone]  Yeimi needs additional information to get the pt an insulin pump. She is requesting a call at [phone]  [phone].

## 2017-11-28 NOTE — TELEPHONE ENCOUNTER
Received a call back from Ms Jalloh   [facility]. She says she need to know why this pt need a pump. Pt insurance was denied for  pump and if pt really need a  pump she need a PA. Please advise.

## 2017-11-28 NOTE — TELEPHONE ENCOUNTER
Called [facility] and left a message for Ms. Yeimi to give a call back regards need information for pt for pump. Contact no was provided.

## 2017-11-29 ENCOUNTER — TELEPHONE (OUTPATIENT)
Dept: ENDOCRINOLOGY | Facility: CLINIC | Age: 22
End: 2017-11-29

## 2017-11-29 NOTE — TELEPHONE ENCOUNTER
Called to insurance company for pt pump and left a voice mail to give a call back. Contact no was provided.

## 2017-11-29 NOTE — TELEPHONE ENCOUNTER
Called a insurance company to do PA for  pump. Insurance company need CPT and hip cups code from DME.  DME company needs  to call back to insurance company for PA for pump. Trying to reach out  to the pt to give all information regarding pump but cant able to reach a pt and pt  phone has not set up a voice mail.

## 2017-12-01 ENCOUNTER — TELEPHONE (OUTPATIENT)
Dept: ENDOCRINOLOGY | Facility: CLINIC | Age: 22
End: 2017-12-01

## 2017-12-01 NOTE — TELEPHONE ENCOUNTER
----- Message from Xiomara Bright sent at 12/1/2017 10:28 AM CST -----  Contact: Yeimi  [phone] ext [phone]  Please call the rep back in regards to the pt    No additional information was provided

## 2017-12-04 ENCOUNTER — TELEPHONE (OUTPATIENT)
Dept: ENDOCRINOLOGY | Facility: CLINIC | Age: 22
End: 2017-12-04

## 2017-12-04 NOTE — TELEPHONE ENCOUNTER
Spoke with the Yeimi and notified that trying to reach a patient several times but pt never respond back regards her pump. Ms Jalloh says she will try to reach her back and also she will reach out the [facility] for approval for  insurance company  for pt pump.  [facility] need [facility] information.

## 2017-12-14 ENCOUNTER — TELEPHONE (OUTPATIENT)
Dept: ENDOCRINOLOGY | Facility: CLINIC | Age: 22
End: 2017-12-14

## 2017-12-14 NOTE — TELEPHONE ENCOUNTER
----- Message from Julia Francis sent at 12/14/2017 11:51 AM CST -----  Contact: Yeimi / Medicaid [phone]  Yeimi is requesting a call in regards to an update on pts insulin pump.

## 2018-01-04 ENCOUNTER — HOSPITAL ENCOUNTER (EMERGENCY)
Facility: OTHER | Age: 23
Discharge: HOME | End: 2018-01-04
Attending: INTERNAL MEDICINE
Payer: MEDICAID

## 2018-01-04 VITALS
HEART RATE: 103 BPM | BODY MASS INDEX: 30.23 KG/M2 | TEMPERATURE: 100 F | RESPIRATION RATE: 18 BRPM | HEIGHT: 60 IN | WEIGHT: 154 LBS | SYSTOLIC BLOOD PRESSURE: 115 MMHG | DIASTOLIC BLOOD PRESSURE: 67 MMHG | OXYGEN SATURATION: 98 %

## 2018-01-04 DIAGNOSIS — B34.9 ACUTE VIRAL SYNDROME: Primary | ICD-10-CM

## 2018-01-04 LAB
B-HCG UR QL: NEGATIVE
CTP QC/QA: YES
CTP QC/QA: YES
FLUAV AG NPH QL: NEGATIVE
FLUBV AG NPH QL: NEGATIVE
POCT GLUCOSE: 239 MG/DL (ref 70–110)

## 2018-01-04 PROCEDURE — 87804 INFLUENZA ASSAY W/OPTIC: CPT

## 2018-01-04 PROCEDURE — 25000003 PHARM REV CODE 250: Performed by: NURSE PRACTITIONER

## 2018-01-04 PROCEDURE — 99284 EMERGENCY DEPT VISIT MOD MDM: CPT | Mod: 25

## 2018-01-04 PROCEDURE — 81025 URINE PREGNANCY TEST: CPT | Performed by: NURSE PRACTITIONER

## 2018-01-04 RX ORDER — AZELASTINE 1 MG/ML
2 SPRAY, METERED NASAL 2 TIMES DAILY
Qty: 30 ML | Refills: 0 | Status: SHIPPED | OUTPATIENT
Start: 2018-01-04 | End: 2018-09-17

## 2018-01-04 RX ORDER — ACETAMINOPHEN 325 MG/1
650 TABLET ORAL
Status: COMPLETED | OUTPATIENT
Start: 2018-01-04 | End: 2018-01-04

## 2018-01-04 RX ORDER — IBUPROFEN 800 MG/1
800 TABLET ORAL EVERY 8 HOURS PRN
Qty: 30 TABLET | Refills: 0 | Status: SHIPPED | OUTPATIENT
Start: 2018-01-04 | End: 2018-09-17

## 2018-01-04 RX ORDER — OSELTAMIVIR PHOSPHATE 75 MG/1
75 CAPSULE ORAL 2 TIMES DAILY
Qty: 10 CAPSULE | Refills: 0 | Status: SHIPPED | OUTPATIENT
Start: 2018-01-04 | End: 2018-01-09

## 2018-01-04 RX ORDER — FLUTICASONE PROPIONATE 50 MCG
2 SPRAY, SUSPENSION (ML) NASAL DAILY
Qty: 15 G | Refills: 0 | Status: SHIPPED | OUTPATIENT
Start: 2018-01-04 | End: 2018-12-04

## 2018-01-04 RX ADMIN — ACETAMINOPHEN 650 MG: 325 TABLET ORAL at 06:01

## 2018-01-05 NOTE — ED PROVIDER NOTES
"Encounter Date: 1/4/2018       History     Chief Complaint   Patient presents with    Back Pain     pt states "I've been having back pain since this morning"    Cough     pt states "I've been having a cough and fever since this morning"      22-year-old female presents to the emergency department complaining of cough, congestion and muscle aches ×2 days.      The history is provided by the patient. No  was used.   URI   The primary symptoms include cough and myalgias. The current episode started yesterday. This is a new problem. The problem has not changed since onset.  The cough began yesterday. The cough is non-productive.   Myalgias began yesterday. The myalgias are generalized. The myalgias are aching. The myalgia pain is at a severity of 4/10.   The onset of the illness is associated with exposure to sick contacts. Symptoms associated with the illness include congestion and rhinorrhea.     Review of patient's allergies indicates:   Allergen Reactions    Clindamycin Anaphylaxis and Hives     Past Medical History:   Diagnosis Date    Diabetes mellitus type I     Heart murmur     Sickle cell trait      Past Surgical History:   Procedure Laterality Date    cyst removed on buttox  12/2011    DILATION AND CURETTAGE OF UTERUS  05/17/13    PILONIDAL CYST DRAINAGE  2014     Family History   Problem Relation Age of Onset    Diabetes Paternal Grandmother     Hypertension Paternal Grandmother     Asthma Brother     Thyroid disease Mother      Social History   Substance Use Topics    Smoking status: Never Smoker    Smokeless tobacco: Never Used    Alcohol use No     Review of Systems   HENT: Positive for congestion and rhinorrhea.    Respiratory: Positive for cough.    Musculoskeletal: Positive for myalgias.   All other systems reviewed and are negative.      Physical Exam     Initial Vitals [01/04/18 1836]   BP Pulse Resp Temp SpO2   115/67 103 18 (!) 101.6 °F (38.7 °C) 98 %      MAP     "   83         Physical Exam    Nursing note and vitals reviewed.  Constitutional: She appears well-developed and well-nourished. No distress.   HENT:   Head: Normocephalic and atraumatic.   Right Ear: External ear normal.   Left Ear: External ear normal.   Clear nasal discharge, clear post nasal drip, posterior oropharyngeal erythema without exudate or edema   Eyes: EOM are normal.   Bilateral conjunctival injection   Neck: Normal range of motion.   Cardiovascular: Normal rate and regular rhythm.   Pulmonary/Chest: Breath sounds normal. No respiratory distress.   Abdominal: Soft.   Musculoskeletal: Normal range of motion. She exhibits no tenderness.   Neurological: She is alert.   Skin: Skin is warm and dry.   Psychiatric: She has a normal mood and affect. Thought content normal.         ED Course   Procedures  Labs Reviewed   POCT GLUCOSE - Abnormal; Notable for the following:        Result Value    POCT Glucose 239 (*)     All other components within normal limits   POCT URINE PREGNANCY   POCT INFLUENZA A/B   POCT GLUCOSE             Medical Decision Making:   Initial Assessment:   22-year-old female presents to the emergency department complaining of cough, congestion and muscle aches ×2 days.    Differential Diagnosis:   Influenza  Acute nasopharyngitis  Bronchitis  Pneumonia  ED Management:  Patient was given instructions for viral syndrome and prescription for Tamiflu, Astelin, fluticasone and ibuprofen.  She was advised to follow-up with her primary care physician within the next 3 days for reevaluation.                   ED Course      Clinical Impression:   The encounter diagnosis was Acute viral syndrome.    Disposition:   Disposition: Discharged  Condition: Stable                        Jayme Mccarty MD  01/04/18 1946

## 2018-01-24 ENCOUNTER — LAB VISIT (OUTPATIENT)
Dept: LAB | Facility: HOSPITAL | Age: 23
End: 2018-01-24
Attending: NURSE PRACTITIONER
Payer: MEDICAID

## 2018-01-24 DIAGNOSIS — E10.65 TYPE 1 DIABETES MELLITUS WITH HYPERGLYCEMIA: ICD-10-CM

## 2018-01-24 LAB
ESTIMATED AVG GLUCOSE: 183 MG/DL
HBA1C MFR BLD HPLC: 8 %

## 2018-01-24 PROCEDURE — 83036 HEMOGLOBIN GLYCOSYLATED A1C: CPT

## 2018-01-24 PROCEDURE — 36415 COLL VENOUS BLD VENIPUNCTURE: CPT

## 2018-02-05 ENCOUNTER — OFFICE VISIT (OUTPATIENT)
Dept: ENDOCRINOLOGY | Facility: CLINIC | Age: 23
End: 2018-02-05
Payer: MEDICAID

## 2018-02-05 VITALS
HEIGHT: 60 IN | HEART RATE: 76 BPM | BODY MASS INDEX: 30 KG/M2 | WEIGHT: 152.81 LBS | SYSTOLIC BLOOD PRESSURE: 120 MMHG | DIASTOLIC BLOOD PRESSURE: 64 MMHG

## 2018-02-05 DIAGNOSIS — E11.649 HYPOGLYCEMIA ASSOCIATED WITH DIABETES: ICD-10-CM

## 2018-02-05 DIAGNOSIS — E10.65 TYPE 1 DIABETES MELLITUS WITH HYPERGLYCEMIA: Primary | ICD-10-CM

## 2018-02-05 DIAGNOSIS — E78.5 DYSLIPIDEMIA: ICD-10-CM

## 2018-02-05 DIAGNOSIS — Z71.9 HEALTH EDUCATION/COUNSELING: ICD-10-CM

## 2018-02-05 DIAGNOSIS — E10.40 TYPE 1 DIABETES MELLITUS WITH DIABETIC NEUROPATHY: ICD-10-CM

## 2018-02-05 DIAGNOSIS — E66.3 OVERWEIGHT (BMI 25.0-29.9): ICD-10-CM

## 2018-02-05 PROBLEM — N76.0 BV (BACTERIAL VAGINOSIS): Status: RESOLVED | Noted: 2017-05-14 | Resolved: 2018-02-05

## 2018-02-05 PROBLEM — N30.01 ACUTE CYSTITIS WITH HEMATURIA: Status: RESOLVED | Noted: 2017-10-18 | Resolved: 2018-02-05

## 2018-02-05 PROBLEM — B34.9 ACUTE VIRAL SYNDROME: Status: RESOLVED | Noted: 2018-01-04 | Resolved: 2018-02-05

## 2018-02-05 PROBLEM — R50.9 ACUTE FEBRILE ILLNESS: Status: RESOLVED | Noted: 2017-05-13 | Resolved: 2018-02-05

## 2018-02-05 PROBLEM — B96.89 BV (BACTERIAL VAGINOSIS): Status: RESOLVED | Noted: 2017-05-14 | Resolved: 2018-02-05

## 2018-02-05 PROCEDURE — 99214 OFFICE O/P EST MOD 30 MIN: CPT | Mod: S$PBB,,, | Performed by: NURSE PRACTITIONER

## 2018-02-05 PROCEDURE — 99214 OFFICE O/P EST MOD 30 MIN: CPT | Mod: PBBFAC | Performed by: NURSE PRACTITIONER

## 2018-02-05 PROCEDURE — 99999 PR PBB SHADOW E&M-EST. PATIENT-LVL IV: CPT | Mod: PBBFAC,,, | Performed by: NURSE PRACTITIONER

## 2018-02-05 PROCEDURE — 3008F BODY MASS INDEX DOCD: CPT | Mod: ,,, | Performed by: NURSE PRACTITIONER

## 2018-02-05 RX ORDER — INSULIN LISPRO 100 [IU]/ML
INJECTION, SOLUTION INTRAVENOUS; SUBCUTANEOUS
Qty: 1 BOX | Refills: 11
Start: 2018-02-05 | End: 2018-12-31 | Stop reason: SDUPTHER

## 2018-02-05 RX ORDER — LANCETS
EACH MISCELLANEOUS
Qty: 200 EACH | Refills: 11 | Status: SHIPPED | OUTPATIENT
Start: 2018-02-05 | End: 2018-02-14

## 2018-02-05 RX ORDER — INSULIN GLARGINE 100 [IU]/ML
INJECTION, SOLUTION SUBCUTANEOUS
Qty: 1 BOX | Refills: 6
Start: 2018-02-05 | End: 2018-03-12 | Stop reason: CLARIF

## 2018-02-05 NOTE — PROGRESS NOTES
CC: This 22 y.o. AA female presents for management of T1DM and review of chronic medical conditions.     HPI:   Diagnosed with T1DM in 2005. Reports a syncopal episode at the time of diagnosis and BG readings were HIGH.   + reports she has been hospitalized for hyperglycemia with BG readings in the 600's during her pregnancy. She reports she was off insulin at that time and on oral medications.   Denies hx of DKA though.    No hospitalization since then.  Recent cpeptide/TARIQ done per JOANN Cao NP in 2017.    Dental infection, needs tooth pulled. Currently on antibiotics- follow up w/ [facility].    Pt needs strips/lancets-true metrix not approved.    Recent flu in Jan 2018.    Last seen by Elva Cao NP in July 2017 and is now being seen by me again today.    She does have a glucagon kit at home and family and friends know how to use it. She has a medic alert bracelet- needs new one.     CURRENT DM MEDS:  Lantus 40 units daily, humalog 6-10-10 units AC with low dose correction scale 150-200 +1, etc.    Pt denies missed doses. Reports sometimes she is taking after it before she eats.     Pt is monitoring BG at home 3-4 times a day. Sometimes more. Has been checking 2 hours after she gives insulin to ensure her BG is decreasing. She feels as if her meter is not working correctly.   Jay Schmid brought glucometer to clinic today revealing the following BG readings:  fbg 160s-180s    Highest 303 correcting hypoglycemic episode.     True metrix ----issues w/ coverage.       Feels s/s of hypoglycemia when BG <120's. Treats with food or juice. Doesn't have hypoglycemia often.    She is afraid that her BG will drop at night so she is having decreased hours of sleep due to anxiety.      DIET/ MEAL PATTERN: eats more towards the end of the day after work.   Eats 2 meals a day.   Drinks mostly water.   Reports she rarely snacks.      EXERCISE: walks everyday. Walks the stairs.     Works at a hotel during the day.      STANDARDS OF CARE:   Eye exam: 2014, overdue  Foot exam: none   ASA: none  Statin: none  ACE-I/ARB: none    ROS:   Gen: Appetite good, no weight gain or loss, denies fatigue and weakness.  Skin: Skin is intact and heals well, no rashes, pruritis, easy bruising, no hair changes, no intolerance to heat/cold.  Eyes: + blurry vision   Resp: no SOB or HARRIS, no cough  Cardiac: No palpitations, chest pain, denies syncope, weakness, no edema or cyanosis.  GI: No nausea or vomiting, diarrhea, constipation, or abdominal pain.  /GYN: No nocturia, no urinary frequency, burning or pain.   PVD: No leg pain with or without exercise, denies cyanosis, pallor, or cold extremities.  MS/Neuro: Denies numbness/ tingling in BLE; FROM of joints without swelling or pain. Gait steady, speech clear, no tremor, coordination problem.  Psych: Denies drug/ETOH abuse, no hx. of eating disorders or depression.  Other systems: negative.      Lab Results   Component Value Date    HGBA1C 8.0 (H) 01/24/2018     Lab Results   Component Value Date    TSH 1.045 10/17/2017       Chemistry        Component Value Date/Time     10/17/2017 1108    K 4.8 10/17/2017 1108     10/17/2017 1108    CO2 27 10/17/2017 1108    BUN 9 10/17/2017 1108    CREATININE 1.0 10/17/2017 1108     (H) 10/17/2017 1108        Component Value Date/Time    CALCIUM 9.6 10/17/2017 1108    ALKPHOS 85 10/17/2017 1108    AST 20 10/17/2017 1108    ALT 45 (H) 10/17/2017 1108    BILITOT 0.5 10/17/2017 1108          Lab Results   Component Value Date    LDLCALC 103 (H) 04/27/2017         PHYSICAL EXAMINATION  Constitutional: Well developed, well nourished, NAD.   Psych: AAOx3, appropriate mood and affect, pleasant expression, conversant, appears relaxed, well groomed.   Eyes: Conjunctiva and corneas clear.  Neck: Supple, trachea midline, FROM, no thyromegaly or nodules, carotid pulses strong, no bruits.  Respiratory: Resp even and unlabored, CTA bilateral.  Cardiac:  RRR, S1, S2 heard, no murmurs; radial pulses +2 bilaterally.   Abdomen: Soft, non-tender, non-distended; +BSs x  4.  Vascular: Same temperature peripherally to centrally, DP pulses +2 bilaterally, no edema.   Neuro: Gait steady.  Skin: Normal skin turgor. Skin warm and dry. + acanthosis nigracans observed. Injection sites with no complications.   Feet: No pressure areas, blisters, ulcers. + calluses bilateral plantar surfaces. Wearing sandals, nails in good condition.    Foot exam deffered- done 05/2017.      ASSESSMENT and PLAN:  1. Type 1 diabetes mellitus with hyperglycemia  Ambulatory Referral to Diabetes Education    insulin glargine (LANTUS SOLOSTAR) 100 unit/mL (3 mL) InPn pen    insulin lispro (HUMALOG KWIKPEN) 100 unit/mL InPn pen    Hemoglobin A1c   2. Overweight (BMI 25.0-29.9)     3. Hypoglycemia associated with diabetes     4. Dyslipidemia     5. Health education/counseling     6. Type 1 diabetes mellitus with diabetic neuropathy  Ambulatory consult to Podiatry    Hemoglobin A1c     1. Medicaid- will have to go in pool, f/u at [facility]  DE needed-nutrition/hypoglycemia  a1c next time- in 4 mos  Podiatry needed-neuropathy  Change basal to 35 units  Continue meal insulin 6-10-10 w/ same target 150/+1, etc.    2. Body mass index is 29.84 kg/m². may increase insulin resistance.  Lose 5-10% in the next 3-6 mos via more activity 30 mins-5x a week  3. See above, change basal, DE  4.   Lab Results   Component Value Date    LDLCALC 103 (H) 04/27/2017     Slightly above goal  5. See above DE needed, logs given. Info on support group given.  6. See above.  Orders Placed This Encounter   Procedures    Hemoglobin A1c     Standing Status:   Future     Standing Expiration Date:   4/6/2019    Ambulatory Referral to Diabetes Education     Referral Priority:   Routine     Referral Type:   Consultation     Referral Reason:   Specialty Services Required     Requested Specialty:   Endocrinology     Number of Visits  Requested:   1     Expiration Date:   2/5/2019    Ambulatory consult to Podiatry     Referral Priority:   Routine     Referral Type:   Consultation     Referral Reason:   Specialty Services Required     Requested Specialty:   Podiatry     Number of Visits Requested:   1        Follow-up in about 4 months (around 6/5/2018).

## 2018-02-05 NOTE — PATIENT INSTRUCTIONS
Snacks can be an important part of a balanced, healthy meal plan. They allow you to eat more frequently, feeling full and satisfied throughout the day. Also, they allow you to spread carbohydrates evenly, which may stabilize blood sugars.  Plus, snacks are enjoyable!     The amount of carbohydrate needed at snacks varies. Generally, about 15-30 grams of carbohydrate per snack is recommended.  Below you will find some tasty treats.       0-5 gm carb   Crystal Light   Vitamin Water Zero   Herbal tea, unsweetened   2 tsp peanut butter on celery   1./2 cup sugar-free jell-o   1 sugar-free popsicle   ¼ cup blueberries   8oz Blue Vicki unsweetened almond milk   5 baby carrots & celery sticks, cucumbers, bell peppers dipped in ¼ cup salsa, 2Tbsp light ranch dressing or 2Tbsp plain Greek yogurt   10 Goldfish crackers   ½ oz low-fat cheese or string cheese   1 closed handful of nuts, unsalted   1 Tbsp of sunflower seeds, unsalted   1 cup Smart Pop popcorn   1 whole grain brown rice cake        15 gm carb   1 small piece of fruit or ½ banana or 1/2 cup lite canned fruit   3 xochitl cracker squares   3 cups Smart Pop popcorn, top spray butter, Hughes lite salt or cinnamon and Truvia   5 Vanilla Wafers   ½ cup low fat, no added sugar ice cream or frozen yogurt (Blue bell, Blue Bunny, Weight Watchers, Skinny Cow)   ½ turkey, ham, or chicken sandwich   ½ c fruit with ½ c Cottage cheese   4-6 unsalted wheat crackers with 1 oz low fat cheese or 1 tbsp peanut butter    30-45 goldfish crackers (depending on flavor)    7-8 Jainism mini brown rice cakes (caramel, apple cinnamon, chocolate)    12 Jainism mini brown rice cakes (cheddar, bbq, ranch)    1/3 cup hummus dip with raw veg   1/2 whole wheat diane, 1Tbsp hummus   Mini Pizza (1/2 whole wheat English muffin, low-fat  cheese, tomato sauce)   100 calorie snack pack (Oreo, Chips Ahoy, Ritz Mix, Baked Cheetos)   4-6 oz. light or Greek Style yogurt  (Brendan, Rosalia, [facility], [facility])   ½ cup sugar-free pudding     6 in. wheat tortilla or diane oven toasted chips (topped with spray butter flavoring, cinnamon, Truvia OR spray butter, garlic powder, chili powder)    18 BBQ Popchips (available at Target, Whole Foods, Fresh Market)

## 2018-02-14 RX ORDER — LANCETS
EACH MISCELLANEOUS
Qty: 200 EACH | Refills: 11 | Status: ON HOLD | OUTPATIENT
Start: 2018-02-14 | End: 2019-03-22 | Stop reason: HOSPADM

## 2018-02-14 NOTE — TELEPHONE ENCOUNTER
----- Message from Julia Francis sent at 2/14/2018  3:39 PM CST -----  Contact: Self [phone]  .Checking on the status of refill request.  PT called to check status of PA for test strips. She is out.

## 2018-03-12 RX ORDER — INSULIN GLARGINE 100 [IU]/ML
35 INJECTION, SOLUTION SUBCUTANEOUS DAILY
Qty: 4 BOX | Refills: 3 | Status: SHIPPED | OUTPATIENT
Start: 2018-03-12 | End: 2019-02-25

## 2018-03-12 NOTE — TELEPHONE ENCOUNTER
----- Message from Julia Francis sent at 3/12/2018  8:55 AM CDT -----  Contact: Self [phone]  ..Medication question / problems.  TRINH JOSE is not covered by pts insurance. She is requesting an alternative.    ..  [employer] [id number] [address] [facility] [address] [address] [address]  [facility] [place] [address]  Phone: [phone] Fax: [phone]      Out of medication.

## 2018-06-05 ENCOUNTER — LAB VISIT (OUTPATIENT)
Dept: LAB | Facility: HOSPITAL | Age: 23
End: 2018-06-05
Attending: NURSE PRACTITIONER
Payer: MEDICAID

## 2018-06-05 DIAGNOSIS — E10.65 TYPE 1 DIABETES MELLITUS WITH HYPERGLYCEMIA: ICD-10-CM

## 2018-06-05 DIAGNOSIS — E10.40 TYPE 1 DIABETES MELLITUS WITH DIABETIC NEUROPATHY: ICD-10-CM

## 2018-06-05 DIAGNOSIS — E78.5 DYSLIPIDEMIA: ICD-10-CM

## 2018-06-05 LAB
CHOLEST SERPL-MCNC: 185 MG/DL
CHOLEST/HDLC SERPL: 3.4 {RATIO}
ESTIMATED AVG GLUCOSE: 186 MG/DL
HBA1C MFR BLD HPLC: 8.1 %
HDLC SERPL-MCNC: 54 MG/DL
HDLC SERPL: 29.2 %
LDLC SERPL CALC-MCNC: 120.6 MG/DL
NONHDLC SERPL-MCNC: 131 MG/DL
TRIGL SERPL-MCNC: 52 MG/DL

## 2018-06-05 PROCEDURE — 83036 HEMOGLOBIN GLYCOSYLATED A1C: CPT

## 2018-06-05 PROCEDURE — 80061 LIPID PANEL: CPT

## 2018-06-05 PROCEDURE — 36415 COLL VENOUS BLD VENIPUNCTURE: CPT

## 2018-06-29 DIAGNOSIS — E10.65 TYPE 1 DIABETES MELLITUS WITH HYPERGLYCEMIA: ICD-10-CM

## 2018-06-29 RX ORDER — INSULIN LISPRO 100 [IU]/ML
INJECTION, SOLUTION INTRAVENOUS; SUBCUTANEOUS
Qty: 15 ML | Refills: 0 | Status: SHIPPED | OUTPATIENT
Start: 2018-06-29 | End: 2018-08-28 | Stop reason: SDUPTHER

## 2018-07-18 ENCOUNTER — HOSPITAL ENCOUNTER (EMERGENCY)
Facility: HOSPITAL | Age: 23
Discharge: HOME | End: 2018-07-18
Attending: EMERGENCY MEDICINE
Payer: MEDICAID

## 2018-07-18 VITALS
OXYGEN SATURATION: 93 % | SYSTOLIC BLOOD PRESSURE: 114 MMHG | HEIGHT: 60 IN | RESPIRATION RATE: 18 BRPM | DIASTOLIC BLOOD PRESSURE: 66 MMHG | WEIGHT: 160 LBS | HEART RATE: 73 BPM | TEMPERATURE: 99 F | BODY MASS INDEX: 31.41 KG/M2

## 2018-07-18 DIAGNOSIS — K04.01 ACUTE PULPITIS: Primary | ICD-10-CM

## 2018-07-18 LAB
POCT GLUCOSE: 130 MG/DL (ref 70–110)
POCT GLUCOSE: 58 MG/DL (ref 70–110)

## 2018-07-18 PROCEDURE — 99283 EMERGENCY DEPT VISIT LOW MDM: CPT | Mod: 25

## 2018-07-18 PROCEDURE — 82962 GLUCOSE BLOOD TEST: CPT

## 2018-07-18 RX ORDER — HYDROCODONE BITARTRATE AND ACETAMINOPHEN 5; 325 MG/1; MG/1
1 TABLET ORAL EVERY 6 HOURS PRN
Qty: 18 TABLET | Refills: 0 | Status: SHIPPED | OUTPATIENT
Start: 2018-07-18 | End: 2018-09-17

## 2018-07-18 RX ORDER — PENICILLIN V POTASSIUM 500 MG/1
500 TABLET, FILM COATED ORAL 4 TIMES DAILY
Qty: 28 TABLET | Refills: 0 | Status: SHIPPED | OUTPATIENT
Start: 2018-07-18 | End: 2018-07-25

## 2018-07-19 NOTE — ED TRIAGE NOTES
Pt c/o top right side dental pain, right ear pain, right eye pain. States there is a hole in the tooth. Pain is 6/10 at this time. Denies vision change, hearing change. Pt unable to get appointment with dentist. Pt took Tylenol 5 hours PTA

## 2018-07-19 NOTE — ED PROVIDER NOTES
"Encounter Date: 7/18/2018    SCRIBE #1 NOTE: I, Vivi Rodriguez, am scribing for, and in the presence of,  Vicky BROWN. I have scribed the following portions of the note - Other sections scribed: HPI, ROS, PE.       History     Chief Complaint   Patient presents with    Dental Pain     "I got a hole in my teeth so it's causing a lot of pressure in my ear. My dentist can't see me no time soon."      CC: Dental Pain    23 y/o female with DM Type I, heart murmur and sickle cell trait presents to ED c/o dental pain/pressure in right upper jaw with associated right sided facial pain for the past 2 months.  Patient reports pain is 6/10 and worse with chewing.  The patient reports that root canal was not finished two months ago and that the original packing that was in the tooth fell out.  Patient also reports sensitivity to hot/cold food or drink and sometimes noticing a sour taste in her mouth.  Patient has a dentist appointment scheduled in two weeks. Patient denies fever, chills, cough.  No other symptoms.        The history is provided by the patient. No  was used.     Review of patient's allergies indicates:   Allergen Reactions    Clindamycin Anaphylaxis and Hives     Past Medical History:   Diagnosis Date    Diabetes mellitus type I     Heart murmur     Sickle cell trait      Past Surgical History:   Procedure Laterality Date    cyst removed on buttox  12/2011    DILATION AND CURETTAGE OF UTERUS  05/17/13    PILONIDAL CYST DRAINAGE  2014     Family History   Problem Relation Age of Onset    Diabetes Paternal Grandmother     Hypertension Paternal Grandmother     Asthma Brother     Thyroid disease Mother      Social History   Substance Use Topics    Smoking status: Never Smoker    Smokeless tobacco: Never Used    Alcohol use No     Review of Systems   Constitutional: Negative for chills and fever.   HENT: Positive for dental problem (right side upper jaw with associated right " sided facial pain). Negative for ear pain and rhinorrhea.    Eyes: Negative for redness and visual disturbance.   Respiratory: Negative for cough and shortness of breath.    Cardiovascular: Negative for chest pain.   Gastrointestinal: Negative for diarrhea, nausea and vomiting.   Genitourinary: Negative for difficulty urinating and dysuria.   Musculoskeletal: Negative for back pain and neck pain.   Skin: Negative for rash.   Neurological: Negative for weakness and headaches.       Physical Exam     Initial Vitals [07/18/18 2106]   BP Pulse Resp Temp SpO2   114/66 73 18 98.5 °F (36.9 °C) (!) 93 %      MAP       --         Physical Exam    Nursing note and vitals reviewed.  Constitutional: She appears well-developed and well-nourished.  Non-toxic appearance. She does not appear ill.   HENT:   Head: Normocephalic and atraumatic.   Mouth/Throat: Mucous membranes are normal. No dental abscesses.   Dental packing to right upper posterior molar, second to the back. No surrounding erythema.  No edema to gums.  No associated drainage.   Eyes: EOM are normal.   Neck: Neck supple.   Cardiovascular: Normal rate and regular rhythm.   Pulmonary/Chest: Effort normal and breath sounds normal. No respiratory distress.   Abdominal: Soft. Normal appearance and bowel sounds are normal. She exhibits no distension. There is no tenderness.   Musculoskeletal: Normal range of motion.   Neurological: She is alert.   Skin: Skin is warm and dry.   Psychiatric: She has a normal mood and affect.         ED Course   Procedures  Labs Reviewed   POCT GLUCOSE - Abnormal; Notable for the following:        Result Value    POCT Glucose 58 (*)     All other components within normal limits   POCT GLUCOSE - Abnormal; Notable for the following:     POCT Glucose 130 (*)     All other components within normal limits          Imaging Results    None          Medical Decision Making:   History:   Old Medical Records: I decided to obtain old medical  records.  Initial Assessment:   This is an urgent evaluation of a 22-year-old woman who presented to the emergency department today secondary to right upper molar pain.  Differential Diagnosis:   Pulpitis, dental abscess, dental caries, fractured tooth, amongst others.  ED Management:  On physical examination, patient was in no acute distress.  Heart and lung sounds were within normal limits.  Examination of the oropharynx revealed a patent posterior oropharynx without edema.  Uvula was midline.  Right upper posterior molar had packing noted within the tooth.  There was no erythema surrounding the gum line.  There was no drainage noted.  There was no drainable abscess.  I will treat patient for presumed pulpitis with Pen-VK as well as with pain control with [place].  She has an appointment with her dentist next week.  I have advised her to keep this.  I have given her return precautions including development of high fever, chills, drainage from the tooth, or any other concerning symptoms.            Scribe Attestation:   Scribe #1: I performed the above scribed service and the documentation accurately describes the services I performed. I attest to the accuracy of the note.    Attending Attestation:           Physician Attestation for Scribe:  Physician Attestation Statement for Scribe #1: I, Vicky BROWN, reviewed documentation, as scribed by Vivi Rodriguez in my presence, and it is both accurate and complete.                    Clinical Impression:   The encounter diagnosis was Acute pulpitis.                             Vicky Lopes MD  07/18/18 5838

## 2018-07-29 ENCOUNTER — HOSPITAL ENCOUNTER (EMERGENCY)
Facility: HOSPITAL | Age: 23
Discharge: HOME | End: 2018-07-29
Attending: EMERGENCY MEDICINE
Payer: MEDICAID

## 2018-07-29 VITALS
SYSTOLIC BLOOD PRESSURE: 131 MMHG | RESPIRATION RATE: 18 BRPM | OXYGEN SATURATION: 97 % | BODY MASS INDEX: 31.02 KG/M2 | DIASTOLIC BLOOD PRESSURE: 71 MMHG | HEIGHT: 60 IN | HEART RATE: 80 BPM | TEMPERATURE: 99 F | WEIGHT: 158 LBS

## 2018-07-29 DIAGNOSIS — N30.01 ACUTE CYSTITIS WITH HEMATURIA: Primary | ICD-10-CM

## 2018-07-29 DIAGNOSIS — R73.9 HYPERGLYCEMIA: ICD-10-CM

## 2018-07-29 LAB
ALBUMIN SERPL BCP-MCNC: 3.5 G/DL
ALP SERPL-CCNC: 76 U/L
ALT SERPL W/O P-5'-P-CCNC: 28 U/L
ANION GAP SERPL CALC-SCNC: 10 MMOL/L
AST SERPL-CCNC: 20 U/L
B-HCG UR QL: NEGATIVE
BACTERIA #/AREA URNS HPF: ABNORMAL /HPF
BASOPHILS # BLD AUTO: 0.02 K/UL
BASOPHILS NFR BLD: 0.2 %
BILIRUB SERPL-MCNC: 0.2 MG/DL
BILIRUB UR QL STRIP: NEGATIVE
BUN SERPL-MCNC: 7 MG/DL
CALCIUM SERPL-MCNC: 9.6 MG/DL
CHLORIDE SERPL-SCNC: 103 MMOL/L
CLARITY UR: ABNORMAL
CO2 SERPL-SCNC: 25 MMOL/L
COLOR UR: ABNORMAL
CREAT SERPL-MCNC: 0.9 MG/DL
CTP QC/QA: YES
DIFFERENTIAL METHOD: ABNORMAL
EOSINOPHIL # BLD AUTO: 0.2 K/UL
EOSINOPHIL NFR BLD: 1.7 %
ERYTHROCYTE [DISTWIDTH] IN BLOOD BY AUTOMATED COUNT: 12.8 %
EST. GFR  (AFRICAN AMERICAN): >60 ML/MIN/1.73 M^2
EST. GFR  (NON AFRICAN AMERICAN): >60 ML/MIN/1.73 M^2
GLUCOSE SERPL-MCNC: 274 MG/DL
GLUCOSE UR QL STRIP: ABNORMAL
HCT VFR BLD AUTO: 37.9 %
HGB BLD-MCNC: 13.6 G/DL
HGB UR QL STRIP: ABNORMAL
KETONES UR QL STRIP: NEGATIVE
LEUKOCYTE ESTERASE UR QL STRIP: ABNORMAL
LYMPHOCYTES # BLD AUTO: 2.7 K/UL
LYMPHOCYTES NFR BLD: 21.2 %
MCH RBC QN AUTO: 28.5 PG
MCHC RBC AUTO-ENTMCNC: 35.9 G/DL
MCV RBC AUTO: 80 FL
MICROSCOPIC COMMENT: ABNORMAL
MONOCYTES # BLD AUTO: 0.5 K/UL
MONOCYTES NFR BLD: 4.3 %
NEUTROPHILS # BLD AUTO: 9.2 K/UL
NEUTROPHILS NFR BLD: 72.4 %
NITRITE UR QL STRIP: NEGATIVE
NON-SQ EPI CELLS #/AREA URNS HPF: 2 /HPF
PH UR STRIP: 6 [PH] (ref 5–8)
PLATELET # BLD AUTO: 351 K/UL
PMV BLD AUTO: 10 FL
POCT GLUCOSE: 380 MG/DL (ref 70–110)
POTASSIUM SERPL-SCNC: 3.9 MMOL/L
PROT SERPL-MCNC: 7.1 G/DL
PROT UR QL STRIP: NEGATIVE
RBC # BLD AUTO: 4.77 M/UL
RBC #/AREA URNS HPF: 25 /HPF (ref 0–4)
SODIUM SERPL-SCNC: 138 MMOL/L
SP GR UR STRIP: 1.01 (ref 1–1.03)
SQUAMOUS #/AREA URNS HPF: 2 /HPF
URN SPEC COLLECT METH UR: ABNORMAL
UROBILINOGEN UR STRIP-ACNC: NEGATIVE EU/DL
WBC # BLD AUTO: 12.67 K/UL
WBC #/AREA URNS HPF: >100 /HPF (ref 0–5)
WBC CLUMPS URNS QL MICRO: ABNORMAL
YEAST URNS QL MICRO: ABNORMAL

## 2018-07-29 PROCEDURE — 96365 THER/PROPH/DIAG IV INF INIT: CPT

## 2018-07-29 PROCEDURE — 81000 URINALYSIS NONAUTO W/SCOPE: CPT

## 2018-07-29 PROCEDURE — 81025 URINE PREGNANCY TEST: CPT | Performed by: EMERGENCY MEDICINE

## 2018-07-29 PROCEDURE — 87077 CULTURE AEROBIC IDENTIFY: CPT

## 2018-07-29 PROCEDURE — 99284 EMERGENCY DEPT VISIT MOD MDM: CPT | Mod: 25

## 2018-07-29 PROCEDURE — 87088 URINE BACTERIA CULTURE: CPT

## 2018-07-29 PROCEDURE — 87086 URINE CULTURE/COLONY COUNT: CPT

## 2018-07-29 PROCEDURE — 25000003 PHARM REV CODE 250: Performed by: PHYSICIAN ASSISTANT

## 2018-07-29 PROCEDURE — 87186 SC STD MICRODIL/AGAR DIL: CPT

## 2018-07-29 PROCEDURE — 80053 COMPREHEN METABOLIC PANEL: CPT

## 2018-07-29 PROCEDURE — 82962 GLUCOSE BLOOD TEST: CPT

## 2018-07-29 PROCEDURE — 85025 COMPLETE CBC W/AUTO DIFF WBC: CPT

## 2018-07-29 PROCEDURE — 63600175 PHARM REV CODE 636 W HCPCS: Performed by: PHYSICIAN ASSISTANT

## 2018-07-29 RX ORDER — ONDANSETRON 4 MG/1
4 TABLET, ORALLY DISINTEGRATING ORAL EVERY 6 HOURS PRN
Qty: 15 TABLET | Refills: 0 | Status: SHIPPED | OUTPATIENT
Start: 2018-07-29 | End: 2018-08-03

## 2018-07-29 RX ORDER — ACETAMINOPHEN 500 MG
500 TABLET ORAL
Status: COMPLETED | OUTPATIENT
Start: 2018-07-29 | End: 2018-07-29

## 2018-07-29 RX ORDER — ACETAMINOPHEN 500 MG
500 TABLET ORAL EVERY 4 HOURS PRN
Qty: 20 TABLET | Refills: 0 | Status: SHIPPED | OUTPATIENT
Start: 2018-07-29 | End: 2018-08-03

## 2018-07-29 RX ORDER — PHENAZOPYRIDINE HYDROCHLORIDE 200 MG/1
200 TABLET, FILM COATED ORAL
Qty: 6 TABLET | Refills: 0 | Status: SHIPPED | OUTPATIENT
Start: 2018-07-29 | End: 2018-07-31

## 2018-07-29 RX ORDER — CEPHALEXIN 500 MG/1
500 CAPSULE ORAL EVERY 12 HOURS
Qty: 20 CAPSULE | Refills: 0 | Status: SHIPPED | OUTPATIENT
Start: 2018-07-29 | End: 2018-08-08

## 2018-07-29 RX ADMIN — CEFTRIAXONE 1 G: 1 INJECTION, SOLUTION INTRAVENOUS at 09:07

## 2018-07-29 RX ADMIN — ACETAMINOPHEN 500 MG: 500 TABLET, FILM COATED ORAL at 09:07

## 2018-07-29 RX ADMIN — SODIUM CHLORIDE 1000 ML: 0.9 INJECTION, SOLUTION INTRAVENOUS at 09:07

## 2018-07-30 NOTE — DISCHARGE INSTRUCTIONS
Take full course of Keflex as prescribed for urinary tract infection and Azo to help with your urinary symptoms. You can take Tylenol as needed for pain. Take Zofran if you begin to develop nausea.     Follow up with primary care in 2 days.   Return to ER for worsening symptoms, fever, worsening pain in abdomen or sides, inability to tolerate by mouth or as needed.

## 2018-07-30 NOTE — ED PROVIDER NOTES
Encounter Date: 7/29/2018       History     Chief Complaint   Patient presents with    Dysuria     Pt reports burning pain with urination, hematuria x 3 days. Denies f/chills,back pain, n/v.    Hematuria     CC: Dysuria, Hematuria    HPI:   Pt is a 22-year-old female with history of T1DM who presents for evaluation of 4 day history of urinary frequency and dysuria, 3 day history of constant suprapubic pain worse after urinating. Pt also reports 2 episodes of hematuria within the past 2 days. Pt reports experiencing constant R flank pain worse with movement 1 day ago that has resolved. Pt reports compliance with home medications, Basaglar 36 units in the morning and sliding scale Humalog. Denies fever, chills, nausea, vomiting, vaginal discharge or concern for STI.           Review of patient's allergies indicates:   Allergen Reactions    Clindamycin Anaphylaxis and Hives     Past Medical History:   Diagnosis Date    Diabetes mellitus type I     Heart murmur     Sickle cell trait      Past Surgical History:   Procedure Laterality Date    cyst removed on buttox  12/2011    DILATION AND CURETTAGE OF UTERUS  05/17/13    PILONIDAL CYST DRAINAGE  2014     Family History   Problem Relation Age of Onset    Diabetes Paternal Grandmother     Hypertension Paternal Grandmother     Asthma Brother     Thyroid disease Mother      Social History   Substance Use Topics    Smoking status: Never Smoker    Smokeless tobacco: Never Used    Alcohol use No     Review of Systems   Constitutional: Negative for chills and fever.   Eyes: Negative for redness.   Gastrointestinal: Positive for abdominal pain. Negative for nausea and vomiting.   Genitourinary: Positive for dysuria, flank pain, frequency and hematuria. Negative for pelvic pain, urgency and vaginal discharge.   Skin: Negative for rash.       Physical Exam     Initial Vitals [07/29/18 2003]   BP Pulse Resp Temp SpO2   119/63 89 16 98.3 °F (36.8 °C) 98 %      MAP        --         Physical Exam    Nursing note and vitals reviewed.  Constitutional: She appears well-developed and well-nourished.   HENT:   Head: Normocephalic.   Right Ear: External ear normal.   Left Ear: External ear normal.   Nose: Nose normal.   Mouth/Throat: Oropharynx is clear and moist.   Eyes: Conjunctivae are normal.   Cardiovascular: Normal rate and regular rhythm. Exam reveals no gallop and no friction rub.    No murmur heard.  Pulmonary/Chest: Breath sounds normal. She has no wheezes. She has no rhonchi. She has no rales.   Abdominal: Soft. Bowel sounds are normal. She exhibits no distension. There is tenderness in the suprapubic area. There is no rigidity, no rebound, no guarding, no CVA tenderness, no tenderness at McBurney's point and negative Wall's sign.   Musculoskeletal: Normal range of motion.   Neurological: She is alert.   Skin: Skin is warm and dry.   Psychiatric: She has a normal mood and affect.         ED Course   Procedures  Labs Reviewed   URINALYSIS - Abnormal; Notable for the following:        Result Value    Appearance, UA Cloudy (*)     Glucose, UA 3+ (*)     Occult Blood UA 2+ (*)     Leukocytes, UA 3+ (*)     All other components within normal limits   CBC W/ AUTO DIFFERENTIAL - Abnormal; Notable for the following:     MCV 80 (*)     Platelets 351 (*)     Gran # (ANC) 9.2 (*)     All other components within normal limits   COMPREHENSIVE METABOLIC PANEL - Abnormal; Notable for the following:     Glucose 274 (*)     All other components within normal limits   URINALYSIS MICROSCOPIC - Abnormal; Notable for the following:     RBC, UA 25 (*)     WBC, UA >100 (*)     WBC Clumps, UA Occasional (*)     Bacteria, UA Few (*)     Non-Squam Epith 2 (*)     All other components within normal limits   POCT GLUCOSE - Abnormal; Notable for the following:     POCT Glucose 380 (*)     All other components within normal limits   CULTURE, URINE   POCT URINE PREGNANCY          Imaging Results    None           Medical Decision Making:   Initial Assessment:    22-year-old female with history of type 1 diabetes presenting for 4 day history of urinary frequency and dysuria, history of constant suprapubic abdominal pain worse after urination and 2 episodes of hematuria.  Denies fever, chills, nausea, vomiting.  Exam findings as above. She is afebrile, well appearing in no distress.  Mild suprapubic tenderness to palpation.  No CVA tenderness.  Patient's initial glucose 380.  Patient states she just took 4 units of Humalog. UPT negative. UA remarkable for infection. Urine culture sent. Labs unremarkable without leukocytosis or evidence of DKA. Pt given IV rocephin in ED. Refused IVF. Glucose trending down in CMP after taking home insulin.  Discharge in stable condition with Keflex and pyridium for UTI, Tylenol for pain, Zofran if she develops nausea. Follow up with primary care in 2 days. Return to ER for worsening symptoms, inability to tolerate PO or as needed. Discussed pt with Dr. Perkins who agrees with assessment and plan.                       Clinical Impression:   The primary encounter diagnosis was Acute cystitis with hematuria. A diagnosis of Hyperglycemia was also pertinent to this visit.                             Lisa Morrison PA-C  07/29/18 7064

## 2018-07-31 LAB — BACTERIA UR CULT: NORMAL

## 2018-08-10 ENCOUNTER — PATIENT MESSAGE (OUTPATIENT)
Dept: ENDOCRINOLOGY | Facility: CLINIC | Age: 23
End: 2018-08-10

## 2018-08-10 NOTE — TELEPHONE ENCOUNTER
----- Message from Lauren Liliana sent at 8/10/2018  3:30 PM CDT -----  Contact: Deisyisaac      [phone]    Pharmacy Calling    Reason for call:  [facility] Name[facility]   Prescription Name: Trumectric Test strips   Phone Number:  [facility]   Tel: [phone]   Additional Information:   Pharmacy says the perscription needs to be written for the Trumetrics /   perscription has .     Out of Strips .  Testing 6 times a day.  / Pls call today.

## 2018-08-18 ENCOUNTER — HOSPITAL ENCOUNTER (EMERGENCY)
Facility: HOSPITAL | Age: 23
Discharge: HOME | End: 2018-08-18
Attending: EMERGENCY MEDICINE
Payer: MEDICAID

## 2018-08-18 VITALS
HEIGHT: 64 IN | WEIGHT: 160 LBS | HEART RATE: 73 BPM | BODY MASS INDEX: 27.31 KG/M2 | SYSTOLIC BLOOD PRESSURE: 117 MMHG | DIASTOLIC BLOOD PRESSURE: 58 MMHG | TEMPERATURE: 99 F | RESPIRATION RATE: 18 BRPM | OXYGEN SATURATION: 100 %

## 2018-08-18 DIAGNOSIS — H92.02 OTALGIA OF LEFT EAR: Primary | ICD-10-CM

## 2018-08-18 LAB
B-HCG UR QL: POSITIVE
CTP QC/QA: YES

## 2018-08-18 PROCEDURE — 99283 EMERGENCY DEPT VISIT LOW MDM: CPT

## 2018-08-18 PROCEDURE — 81025 URINE PREGNANCY TEST: CPT | Performed by: PHYSICIAN ASSISTANT

## 2018-08-18 RX ORDER — CETIRIZINE HYDROCHLORIDE 10 MG/1
10 TABLET ORAL DAILY
Qty: 30 TABLET | Refills: 0 | Status: SHIPPED | OUTPATIENT
Start: 2018-08-18 | End: 2018-12-04

## 2018-08-18 NOTE — ED TRIAGE NOTES
Patient stated that she feels as though something is in her left ear as of today. Pain 8/10.  Patient stated that she is 6 weeks pregnant

## 2018-08-18 NOTE — DISCHARGE INSTRUCTIONS
Tylenol for discomfort. Zyrtec daily. Follow-up with your primary care provider. Return to this ED if you begin with fever, if you begin with ear drainage, if any other problems occur.

## 2018-08-18 NOTE — ED PROVIDER NOTES
"Encounter Date: 8/18/2018       History     Chief Complaint   Patient presents with    Otalgia     " It feel like something stuck in my ear(left)."      21yo F, states she is approx 6 weeks gravid, with chief complaint L otalgia and foreign body sensation L ear. Denies drainage. Denies fever. No odynophagia or dysphagia. No recent illness or known sick contacts. No odontalgia. No neck pain or stiffness. No facial or neck swelling. Pain constant, dull ache, 10/10. No alleviating/exacerbating factors. No radiation. No change in character with change in head orientation. No headache.          Review of patient's allergies indicates:   Allergen Reactions    Clindamycin Anaphylaxis and Hives     Past Medical History:   Diagnosis Date    Diabetes mellitus type I     Heart murmur     Sickle cell trait      Past Surgical History:   Procedure Laterality Date    cyst removed on buttox  12/2011    DILATION AND CURETTAGE OF UTERUS  05/17/13    PILONIDAL CYST DRAINAGE  2014     Family History   Problem Relation Age of Onset    Diabetes Paternal Grandmother     Hypertension Paternal Grandmother     Asthma Brother     Thyroid disease Mother      Social History     Tobacco Use    Smoking status: Never Smoker    Smokeless tobacco: Never Used   Substance Use Topics    Alcohol use: No    Drug use: No     Review of Systems   Constitutional: Negative for chills and fever.   HENT: Positive for ear pain. Negative for congestion, ear discharge, rhinorrhea, sore throat and trouble swallowing.    Eyes: Negative.    Respiratory: Negative for shortness of breath.    Cardiovascular: Negative for chest pain.   Gastrointestinal: Negative for abdominal pain, nausea and vomiting.   Endocrine: Negative.    Genitourinary: Negative for dysuria and vaginal bleeding.   Musculoskeletal: Negative for back pain, neck pain and neck stiffness.   Skin: Negative for rash.   Neurological: Negative for dizziness, weakness, light-headedness and " headaches.   Hematological: Does not bruise/bleed easily.   Psychiatric/Behavioral: Negative.    All other systems reviewed and are negative.      Physical Exam     Initial Vitals [08/18/18 1518]   BP Pulse Resp Temp SpO2   (!) 117/58 73 18 98.8 °F (37.1 °C) 100 %      MAP       --         Physical Exam    Nursing note and vitals reviewed.  Constitutional: She appears well-developed and well-nourished. She is not diaphoretic. No distress.   Well-appearing, nontoxic. Resting comfortably on exam table.   HENT:   Head: Normocephalic and atraumatic.   Mouth/Throat: Oropharynx is clear and moist.   No mastoid swelling or ttp. No ear motion pain. No rash or vesicular lesions to external ear. No obvious swelling to auricle. No pre or post auricular lymphadenopathy. Bilateral TM and ear canal wnl without perforation. Oropharynx wnl. Airway patent without stridor.   Eyes: Conjunctivae and EOM are normal. Pupils are equal, round, and reactive to light.   Neck: Normal range of motion. Neck supple. No tracheal deviation present.   Cardiovascular: Intact distal pulses.   Pulmonary/Chest: Breath sounds normal. No stridor. No respiratory distress. She has no wheezes.   Abdominal: Soft. Bowel sounds are normal. She exhibits no distension. There is no tenderness.   Lymphadenopathy:     She has no cervical adenopathy.   Neurological: She is alert and oriented to person, place, and time.   Skin: Skin is warm and dry. Capillary refill takes less than 2 seconds.   Psychiatric: She has a normal mood and affect. Her behavior is normal. Judgment and thought content normal.         ED Course   Procedures  Labs Reviewed   POCT URINE PREGNANCY - Abnormal; Notable for the following components:       Result Value    POC Preg Test, Ur Positive (*)     All other components within normal limits           Medical Decision Making:   Differential Diagnosis:   Otitis media, otitis externa, mastoiditis, tonsillitis, pharyngitis  ED Management:  No  evidence of infection. Neck supple. No abdominal or vaginal complaints. No urinary complaints. No nasal congestion or rhinorrhea. No odynophagia or dysphagia. No cough or recent resp illness. No SOB or CP. No significant comorbidities. Vitals reassuring. Will treat supportively, have pt f/u with PCP. Return precautions given.                       Clinical Impression:   The encounter diagnosis was Otalgia of left ear.      Disposition:   Disposition: Discharged  Condition: Stable                        Uriel Rosa PA-C  08/18/18 1550

## 2018-08-28 DIAGNOSIS — E10.65 TYPE 1 DIABETES MELLITUS WITH HYPERGLYCEMIA: ICD-10-CM

## 2018-08-28 RX ORDER — INSULIN LISPRO 100 [IU]/ML
INJECTION, SOLUTION INTRAVENOUS; SUBCUTANEOUS
Qty: 15 ML | Refills: 0 | Status: SHIPPED | OUTPATIENT
Start: 2018-08-28 | End: 2018-10-02 | Stop reason: SDUPTHER

## 2018-09-17 ENCOUNTER — LAB VISIT (OUTPATIENT)
Dept: LAB | Facility: HOSPITAL | Age: 23
End: 2018-09-17
Attending: OBSTETRICS & GYNECOLOGY
Payer: MEDICAID

## 2018-09-17 ENCOUNTER — INITIAL PRENATAL (OUTPATIENT)
Dept: OBSTETRICS AND GYNECOLOGY | Facility: CLINIC | Age: 23
End: 2018-09-17
Payer: MEDICAID

## 2018-09-17 VITALS — DIASTOLIC BLOOD PRESSURE: 64 MMHG | SYSTOLIC BLOOD PRESSURE: 124 MMHG | BODY MASS INDEX: 28.15 KG/M2 | WEIGHT: 164 LBS

## 2018-09-17 DIAGNOSIS — O24.011 TYPE 1 DIABETES MELLITUS AFFECTING PREGNANCY IN FIRST TRIMESTER, ANTEPARTUM: ICD-10-CM

## 2018-09-17 DIAGNOSIS — Z3A.09 9 WEEKS GESTATION OF PREGNANCY: Primary | ICD-10-CM

## 2018-09-17 DIAGNOSIS — Z3A.09 9 WEEKS GESTATION OF PREGNANCY: ICD-10-CM

## 2018-09-17 LAB
ABO + RH BLD: NORMAL
BLD GP AB SCN CELLS X3 SERPL QL: NORMAL
ESTIMATED AVG GLUCOSE: 157 MG/DL
HBA1C MFR BLD HPLC: 7.1 %
HGB S BLD QL SOLY: POSITIVE

## 2018-09-17 PROCEDURE — 86803 HEPATITIS C AB TEST: CPT

## 2018-09-17 PROCEDURE — 83036 HEMOGLOBIN GLYCOSYLATED A1C: CPT

## 2018-09-17 PROCEDURE — 87340 HEPATITIS B SURFACE AG IA: CPT

## 2018-09-17 PROCEDURE — 87491 CHLMYD TRACH DNA AMP PROBE: CPT

## 2018-09-17 PROCEDURE — 83021 HEMOGLOBIN CHROMOTOGRAPHY: CPT

## 2018-09-17 PROCEDURE — 36415 COLL VENOUS BLD VENIPUNCTURE: CPT

## 2018-09-17 PROCEDURE — 99204 OFFICE O/P NEW MOD 45 MIN: CPT | Mod: TH,S$PBB,, | Performed by: OBSTETRICS & GYNECOLOGY

## 2018-09-17 PROCEDURE — 85660 RBC SICKLE CELL TEST: CPT | Mod: 91

## 2018-09-17 PROCEDURE — 85660 RBC SICKLE CELL TEST: CPT

## 2018-09-17 PROCEDURE — 86703 HIV-1/HIV-2 1 RESULT ANTBDY: CPT

## 2018-09-17 PROCEDURE — 99999 PR PBB SHADOW E&M-EST. PATIENT-LVL II: CPT | Mod: PBBFAC,,, | Performed by: OBSTETRICS & GYNECOLOGY

## 2018-09-17 PROCEDURE — 87086 URINE CULTURE/COLONY COUNT: CPT

## 2018-09-17 PROCEDURE — 99212 OFFICE O/P EST SF 10 MIN: CPT | Mod: PBBFAC,TH,25 | Performed by: OBSTETRICS & GYNECOLOGY

## 2018-09-17 PROCEDURE — 86762 RUBELLA ANTIBODY: CPT

## 2018-09-17 PROCEDURE — 86850 RBC ANTIBODY SCREEN: CPT

## 2018-09-17 PROCEDURE — 86592 SYPHILIS TEST NON-TREP QUAL: CPT

## 2018-09-17 RX ORDER — PEN NEEDLE, DIABETIC 31 GX5/16"
NEEDLE, DISPOSABLE MISCELLANEOUS
Refills: 6 | COMMUNITY
Start: 2018-07-17

## 2018-09-17 RX ORDER — PYRIDOXINE HCL (VITAMIN B6) 25 MG
TABLET ORAL
Refills: 3 | Status: ON HOLD | COMMUNITY
Start: 2018-08-27 | End: 2019-03-22 | Stop reason: HOSPADM

## 2018-09-17 NOTE — PROGRESS NOTES
JOSE ROBERTO here for initial prenatal care.  Pt has not started on PNV yet. Pt complaining of constipation and nausea/vomiting. [email or web address]

## 2018-09-17 NOTE — PROGRESS NOTES
"Here with partner to begin care  Told to be pregnant last week at a [facility]  History of type 1 diabetes mellitus.  Poorly-controlled.  On insulin    Past Medical History:   Diagnosis Date    Diabetes mellitus type I     Heart murmur     Sickle cell trait        Past Surgical History:   Procedure Laterality Date    cyst removed on buttox  12/2011    DELIVERY-CEASAREAN SECTION N/A 10/16/2014    [facility]    DILATION AND CURETTAGE OF UTERUS  05/17/13    I&D VULVA ABCESS Left 7/23/2014    [facility]    PILONIDAL CYST DRAINAGE  2014       Family History   Problem Relation Age of Onset    Diabetes Paternal Grandmother     Hypertension Paternal Grandmother     Asthma Brother     Thyroid disease Mother        Social History     Socioeconomic History    Marital status: Single     Spouse name: None    Number of children: None    Years of education: None    Highest education level: None   Social Needs    Financial resource strain: None    Food insecurity - worry: None    Food insecurity - inability: None    Transportation needs - medical: None    Transportation needs - non-medical: None   Occupational History    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[employer]    Tobacco Use    Smoking status: Never Smoker    Smokeless tobacco: Never Used   Substance and Sexual Activity    Alcohol use: No    Drug use: No    Sexual activity: Yes     Partners: Male     Birth control/protection: IUD   Other Topics Concern    None   Social History Narrative    No partner. In school, at [employer] for practical nursing.       Current Outpatient Medications   Medication Sig Dispense Refill    BD ULTRA-FINE GABRIELE PEN NEEDLE 32 gauge x 5/32" Ndle USE TO INJECT QID  6    blood glucose strip-disp meter Kit Preferred by insurance, use as directed. 1 kit 0    blood sugar diagnostic Strp To check BG 6x times daily, to use with insurance preferred meter 200 " "strip 11    cetirizine (ZYRTEC) 10 MG tablet Take 1 tablet (10 mg total) by mouth once daily. 30 tablet 0    fluticasone (FLONASE) 50 mcg/actuation nasal spray 2 sprays (100 mcg total) by Each Nare route once daily. 15 g 0    HUMALOG KWIKPEN INSULIN 100 unit/mL InPn pen INJECT 10 UNITS THREE TIMES DAILY BEFORE A MEAL PLUS CORRECTION SCALE. MAX DOSE 60 UNITS. 15 mL 0    insulin glargine (BASAGLAR KWIKPEN U-100 INSULIN) 100 unit/mL (3 mL) InPn pen Inject 35 Units into the skin once daily. 4 Box 3    insulin lispro (HUMALOG KWIKPEN) 100 unit/mL InPn pen Inject 6 units w/ breakfast, 10 units w/ lunch and dinner plus scale 150-200+1, 201-250+2, 251-300+3, 301-350+4. 1 Box 11    lancets Misc To check BG 6x times daily, to use with insurance preferred meter 200 each 11    pen needle, diabetic (BD INSULIN PEN NEEDLE UF MINI) 31 gauge x 3/16" Ndle To use with insulin injections 4 x/day 120 each 6    prenatal vit no.124-iron-folic (PRENATAL VITAMIN) 27 mg iron- 800 mcg Tab Take 1 capsule by mouth once daily. 30 tablet 1    VITAMIN B-6 25 MG tablet TK 1 T PO Q 6 H PRF NAUSEA  3     No current facility-administered medications for this visit.        Review of patient's allergies indicates:   Allergen Reactions    Clindamycin Anaphylaxis and Hives     Review of Systems   Constitutional: Positive for fatigue. Negative for fever, chills, appetite change and unexpected weight change.   HENT: Positive for congestion. Negative for hearing loss, ear pain, sore throat, rhinorrhea, sneezing, mouth sores, neck pain, neck stiffness, voice change and postnasal drip.   Eyes: Negative for photophobia, itching and visual disturbance.   Respiratory: Negative for cough, chest tightness, shortness of breath and wheezing.   Cardiovascular: Negative for chest pain, palpitations and leg swelling.   Gastrointestinal: Positive for nausea, vomiting, abdominal pain and constipation. Negative for diarrhea, blood in stool and abdominal " distention.   Genitourinary: Positive for vaginal discharge and pelvic pain. Negative for dysuria, urgency, frequency, hematuria, flank pain, decreased urine volume, vaginal bleeding, difficulty urinating, genital sores, vaginal pain, menstrual problem and dyspareunia.   Musculoskeletal: Positive for back pain. Negative for myalgias and joint swelling.   Skin: Negative for rash and wound.   Neurological: Positive for headaches. Negative for dizziness, tremors, syncope, speech difficulty, weakness, light-headedness and numbness.   Hematological: Negative for adenopathy. Does not bruise/bleed easily.   Psychiatric/Behavioral: Negative for suicidal ideas, hallucinations, confusion, sleep disturbance, dysphoric mood, decreased concentration and agitation. The patient is not nervous/anxious and is not hyperactive.     Exam with uterus at around 10-12 weeks    Prenatal profile  Prenatal vitamins  Continue with current insulin regimen for now  Accucheck  Back in 2 weeks.

## 2018-09-18 ENCOUNTER — HOSPITAL ENCOUNTER (EMERGENCY)
Facility: HOSPITAL | Age: 23
Discharge: HOME | End: 2018-09-18
Attending: EMERGENCY MEDICINE | Admitting: EMERGENCY MEDICINE
Payer: MEDICAID

## 2018-09-18 VITALS
RESPIRATION RATE: 20 BRPM | OXYGEN SATURATION: 98 % | DIASTOLIC BLOOD PRESSURE: 73 MMHG | WEIGHT: 163 LBS | HEART RATE: 80 BPM | TEMPERATURE: 99 F | BODY MASS INDEX: 28.88 KG/M2 | HEIGHT: 63 IN | SYSTOLIC BLOOD PRESSURE: 115 MMHG

## 2018-09-18 DIAGNOSIS — S69.92XA FINGERNAIL INJURY, LEFT, INITIAL ENCOUNTER: Primary | ICD-10-CM

## 2018-09-18 LAB
C TRACH DNA SPEC QL NAA+PROBE: NOT DETECTED
HBV SURFACE AG SERPL QL IA: NEGATIVE
HCV AB SERPL QL IA: NEGATIVE
HIV 1+2 AB+HIV1 P24 AG SERPL QL IA: NEGATIVE
N GONORRHOEA DNA SPEC QL NAA+PROBE: NOT DETECTED
RPR SER QL: NORMAL
RUBV IGG SER-ACNC: 19.4 IU/ML
RUBV IGG SER-IMP: REACTIVE

## 2018-09-18 PROCEDURE — 99283 EMERGENCY DEPT VISIT LOW MDM: CPT

## 2018-09-18 PROCEDURE — 25000003 PHARM REV CODE 250: Performed by: PHYSICIAN ASSISTANT

## 2018-09-18 PROCEDURE — 11730 AVULSION NAIL PLATE SIMPLE 1: CPT | Mod: F4

## 2018-09-18 RX ORDER — LIDOCAINE HYDROCHLORIDE 10 MG/ML
10 INJECTION INFILTRATION; PERINEURAL
Status: COMPLETED | OUTPATIENT
Start: 2018-09-18 | End: 2018-09-18

## 2018-09-18 RX ORDER — ACETAMINOPHEN 500 MG
500 TABLET ORAL
Status: COMPLETED | OUTPATIENT
Start: 2018-09-18 | End: 2018-09-18

## 2018-09-18 RX ADMIN — ACETAMINOPHEN 500 MG: 500 TABLET, FILM COATED ORAL at 05:09

## 2018-09-18 RX ADMIN — LIDOCAINE HYDROCHLORIDE 10 ML: 10 INJECTION, SOLUTION INFILTRATION; PERINEURAL at 05:09

## 2018-09-18 RX ADMIN — NEOMYCIN AND POLYMYXIN B SULFATES AND BACITRACIN ZINC: 400; 3.5; 5 OINTMENT TOPICAL at 05:09
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
## 2018-09-18 NOTE — DISCHARGE INSTRUCTIONS
Apply antibiotic ointment twice daily to the fingernail region and keep clean.  May take Tylenol every 6 hr as needed for pain.
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
## 2018-09-19 LAB
BACTERIA UR CULT: NORMAL
HGB A MFR BLD ELPH: 54.4 % (ref 95.8–98)
HGB A2 MFR BLD: 3.1 % (ref 2–3.3)
HGB F MFR BLD: 2.7 % (ref 0–0.9)
HGB FRACT BLD ELPH-IMP: ABNORMAL
HGB OTHER MFR BLD ELPH: ABNORMAL %
HGB S BLD QL: POSITIVE
THEVP VARIANT 2: ABNORMAL
THEVP VARIANT 3: ABNORMAL

## 2018-09-24 DIAGNOSIS — E10.65 TYPE 1 DIABETES MELLITUS WITH HYPERGLYCEMIA: ICD-10-CM

## 2018-09-25 ENCOUNTER — ROUTINE PRENATAL (OUTPATIENT)
Dept: OBSTETRICS AND GYNECOLOGY | Facility: CLINIC | Age: 23
End: 2018-09-25
Payer: MEDICAID

## 2018-09-25 VITALS — BODY MASS INDEX: 28.9 KG/M2 | WEIGHT: 163.13 LBS | DIASTOLIC BLOOD PRESSURE: 60 MMHG | SYSTOLIC BLOOD PRESSURE: 112 MMHG

## 2018-09-25 DIAGNOSIS — O26.841 UTERINE SIZE-DATE DISCREPANCY IN FIRST TRIMESTER: Primary | ICD-10-CM

## 2018-09-25 DIAGNOSIS — Z3A.11 11 WEEKS GESTATION OF PREGNANCY: ICD-10-CM

## 2018-09-25 DIAGNOSIS — O21.9 NAUSEA AND VOMITING IN PREGNANCY: ICD-10-CM

## 2018-09-25 DIAGNOSIS — O34.219 PREVIOUS CESAREAN SECTION COMPLICATING PREGNANCY, ANTEPARTUM CONDITION OR COMPLICATION: ICD-10-CM

## 2018-09-25 DIAGNOSIS — E10.65 TYPE 1 DIABETES MELLITUS WITH HYPERGLYCEMIA: ICD-10-CM

## 2018-09-25 PROCEDURE — 76817 TRANSVAGINAL US OBSTETRIC: CPT | Mod: PBBFAC | Performed by: OBSTETRICS & GYNECOLOGY

## 2018-09-25 PROCEDURE — 99213 OFFICE O/P EST LOW 20 MIN: CPT | Mod: PBBFAC,TH,25 | Performed by: OBSTETRICS & GYNECOLOGY

## 2018-09-25 PROCEDURE — 99213 OFFICE O/P EST LOW 20 MIN: CPT | Mod: 25,TH,S$PBB, | Performed by: OBSTETRICS & GYNECOLOGY

## 2018-09-25 PROCEDURE — 99999 PR PBB SHADOW E&M-EST. PATIENT-LVL III: CPT | Mod: PBBFAC,,, | Performed by: OBSTETRICS & GYNECOLOGY

## 2018-09-25 PROCEDURE — 76817 TRANSVAGINAL US OBSTETRIC: CPT | Mod: 26,S$PBB,, | Performed by: OBSTETRICS & GYNECOLOGY

## 2018-09-25 RX ORDER — PEN NEEDLE, DIABETIC 30 GX3/16"
NEEDLE, DISPOSABLE MISCELLANEOUS
Qty: 100 EACH | Refills: 11 | Status: SHIPPED | OUTPATIENT
Start: 2018-09-25 | End: 2018-09-25 | Stop reason: SDUPTHER

## 2018-09-25 RX ORDER — PEN NEEDLE, DIABETIC 30 GX3/16"
NEEDLE, DISPOSABLE MISCELLANEOUS
Qty: 100 EACH | Refills: 11 | Status: SHIPPED | OUTPATIENT
Start: 2018-09-25 | End: 2021-09-15

## 2018-09-25 RX ORDER — PROMETHAZINE HYDROCHLORIDE 25 MG/1
25 TABLET ORAL EVERY 4 HOURS
Qty: 30 TABLET | Refills: 1 | Status: ON HOLD | OUTPATIENT
Start: 2018-09-25 | End: 2019-03-22 | Stop reason: HOSPADM

## 2018-09-25 NOTE — PROGRESS NOTES
No new complaint.  Still with nausea and vomiting  AccuCheck with fasting  with     A transvaginal ultrasound performed showing normal ?female fetus at appropriate size.  No obvious anatomic anomalies noted.  Placenta anterior  Patient and partner reassured    Discussed nausea and vomiting in pregnancy with diabetes  [place]  [facility]  Diabetic teaching  Back in 2 weeks

## 2018-10-02 ENCOUNTER — CLINICAL SUPPORT (OUTPATIENT)
Dept: DIABETES | Facility: CLINIC | Age: 23
End: 2018-10-02
Payer: MEDICAID

## 2018-10-02 ENCOUNTER — TELEPHONE (OUTPATIENT)
Dept: DIABETES | Facility: CLINIC | Age: 23
End: 2018-10-02

## 2018-10-02 DIAGNOSIS — E10.65 TYPE 1 DIABETES MELLITUS WITH HYPERGLYCEMIA: ICD-10-CM

## 2018-10-02 DIAGNOSIS — E11.649 HYPOGLYCEMIA ASSOCIATED WITH DIABETES: ICD-10-CM

## 2018-10-02 DIAGNOSIS — E10.65 TYPE 1 DIABETES MELLITUS WITH HYPERGLYCEMIA: Primary | ICD-10-CM

## 2018-10-02 PROCEDURE — G0108 DIAB MANAGE TRN  PER INDIV: HCPCS | Mod: PBBFAC,PN | Performed by: DIETITIAN, REGISTERED

## 2018-10-02 NOTE — LETTER
October 2, 2018      Rudy Case MD  [address]  [address]  [place] [address]         Patient: Jay Schmid   MR Number: [id number]   YOB: 1995   Date of Visit: 10/2/2018       Dear Dr. Case:    Thank you for referring Jay for diabetes self-management education and support. However due to her limited participation in the verbal discussion during the office visit, we were unable to complete  diabetes management training and  develop her Self-Management Support Plan.     Patient Outcomes:    A1c Status:   Lab Results   Component Value Date    HGBA1C 7.1 (H) 09/17/2018    HGBA1C 8.1 (H) 06/05/2018     Goals  Patient has selected/evaluated goals during today's session: No    Follow up:   · Written information provided to her partner to review with pt when she felt better; schedule follow-up appt with Diabetes Education as needed  · Urged pt to discuss with and follow recommendations from physician regarding IC when modifying MTI doses.  · Reminded pt to discuss BG goals for Diabetes and pregnancy with physician in order to achieve better glycemic control      If you have questions, please do not hesitate to call me. I look forward to providing additional education and support as needed.    Sincerely,    Mabel Britton RD

## 2018-10-02 NOTE — PROGRESS NOTES
Diabetes Education  Author: Mabel Britton RD  Date: 10/2/2018    Diabetes Care Management Summary  Patient visit for introducing pt to diabetes self management  and to discuss Gestational Diabetes care. Pt stated she did not feel well and had limited participation in discussion. Written information and quick verbal suggestions provided for review   Diabetes Education Record Assessment/Progress: Initial  Current Diabetes Risk Level: High     Last A1c:   Lab Results   Component Value Date    HGBA1C 7.1 (H) 09/17/2018     Last Visit with Diabetes Educator: : 10/02/2018  Last OPCM Referral: : Not Found   Enrolled in OPCM: No  Diabetes Type  Diabetes Type : Type I(pregnant 11 weeks)  Diabetes History  Diabetes Diagnosis: >10 years  Current Treatment: Diet, Insulin  Reviewed Problem List with Patient: Yes    Nutrition  Meal Planning: skipping meals, water, drinks regular soda  What type of sweetener do you use?: sugar  What type of beverages do you drink?: milk, juice  Meal Plan 24 Hour Recall - Breakfast: 10-11am: grits, nettles, juice, water;  Meal Plan 24 Hour Recall - Lunch: 3-4pm french fries, water  Meal Plan 24 Hour Recall - Dinner: 9pm: cereal and milk or canned chicken noodle soup w reg lemonade as beverage    Monitoring   Self Monitoring : states should be 4-6x/day  Blood Glucose Logs: No  Do you use a personal continuous glucose monitor?: No    Current Diabetes Treatment   Current Treatment: Diet, Insulin    Social History  Primary Support: Self, Other  Educational Level: Some College  Smoking Status: Never a Smoker  Alcohol Use: Never    Barriers to Change: None  Learning Challenges : None     Readiness to Learn : Non Acceptance. Pt provided little information during education session re DM treatment protocol set up for her by LENORA. NOTE:pt has NoShow or Canceled all 7 previously scheduled appts to discuss diabetes management with RD/CDE since 2014      Cultural Influences: No    Diabetes Education  Assessment/Progress  Diabetes Disease Process (diabetes disease process and treatment options): Individual Session, Written Materials Provided, Instructed, Discussion, Needs Review  Nutrition (Incorporating nutritional management into one's lifestyle): Individual Session, Written Materials Provided, Instructed, Discussion, Needs Review  -diet hx indicates lack of appetite d/t GERD and morning sickness as stated by pt. She attempts to eat 3 meals but they do not contain a consistent amt of CHO. Bkfst may contain 60+g CHO  with 8-10 unitsHumalog , lunch/snack  Contains only high fat CHO - takes 4 u Humalog. Evening meal adds CHO in form of reg sugared lemonade w 8 u. Pt does use sliding scale 150-200 +1 unit; also treats low BG by taking insulin after meals  - Attempted to discuss CHO distribution in meals (B30-45g, L & D 45-60geach, 3 snacks of 15-30geach) using Planning Healthy meals guide and GDM meal plan.; pt focused her attention on her phone and glucometer and stopped participating in discussion; partner Little dietary compliance anticipated     Medications (states correct name, dose, onset, peak, duration, side effects & timing of meds): Discussion, Individual Session, Written Materials Provided  -Pt states she did not know if she can continue w Basaglar d/t not being covered by insurance. Pt also tends to take MTI insulin before meals but sometimes may delay taking it until after in attempts to control BG. Pt uses sliding scale 150-200 +1 unit, etc. Urged pt to discuss these issues w OB and/or ENDO    Monitoring (monitoring blood glucose/other parameters & using results): Written Materials Provided, Individual Session, Discussion, Needs Review  -Pt stated she did not know what was her Target BG goals nor her I:C ratio (with previous pregnancy in 2014, IC 1:8, Basal 35 u and MTI 6-10-10 units)  -Provided pt with target goals used w GDM of 95 or less fasting, 120 or less 2hrpp; log sheets provided  -Reminded pt  SMBG 4-6x/day    Acute Complications (preventing, detecting, and treating acute complications): Discussion, Individual Session, Comprehends Key Points, Needs Review  Chronic Complications (preventing, detecting, and treating chronic complications): Discussion, Individual Session, Needs Review    Unable to continue discussion wt regarding remaining  Diabetes self management education due to pt's lack of participation in the discussion; contact information provided to schedule follow-up once pt feels better.    Goals  Patient has selected/evaluated goals during today's session: No    Diabetes Care Plan/Intervention  Education Plan/Intervention: Individual Follow-Up DSMT(contact information provided)     Education Units of Time   Time Spent: 30 min

## 2018-10-03 RX ORDER — INSULIN LISPRO 100 [IU]/ML
INJECTION, SOLUTION INTRAVENOUS; SUBCUTANEOUS
Qty: 15 ML | Refills: 6 | Status: SHIPPED | OUTPATIENT
Start: 2018-10-03 | End: 2018-12-31

## 2018-10-07 ENCOUNTER — HOSPITAL ENCOUNTER (EMERGENCY)
Facility: HOSPITAL | Age: 23
Discharge: HOME | End: 2018-10-07
Attending: EMERGENCY MEDICINE
Payer: MEDICAID

## 2018-10-07 VITALS
SYSTOLIC BLOOD PRESSURE: 107 MMHG | HEIGHT: 60 IN | DIASTOLIC BLOOD PRESSURE: 55 MMHG | OXYGEN SATURATION: 97 % | HEART RATE: 84 BPM | TEMPERATURE: 98 F | RESPIRATION RATE: 18 BRPM | WEIGHT: 163 LBS | BODY MASS INDEX: 32 KG/M2

## 2018-10-07 DIAGNOSIS — S69.92XA FINGERNAIL INJURY, LEFT, INITIAL ENCOUNTER: Primary | ICD-10-CM

## 2018-10-07 PROCEDURE — 99283 EMERGENCY DEPT VISIT LOW MDM: CPT

## 2018-10-07 PROCEDURE — 25000003 PHARM REV CODE 250: Performed by: PHYSICIAN ASSISTANT

## 2018-10-07 RX ORDER — LIDOCAINE HYDROCHLORIDE 20 MG/ML
5 INJECTION, SOLUTION INFILTRATION; PERINEURAL
Status: COMPLETED | OUTPATIENT
Start: 2018-10-07 | End: 2018-10-07

## 2018-10-07 RX ADMIN — LIDOCAINE HYDROCHLORIDE 5 ML: 20 INJECTION, SOLUTION INFILTRATION; PERINEURAL at 09:10

## 2018-10-07 NOTE — ED PROVIDER NOTES
"Encounter Date: 10/7/2018       History     Chief Complaint   Patient presents with    Finger Injury     hit left pinky finger on the steering wheel, on the 18th. pt reports that she cannot bend it all the way still and feels like "It has no circulation"     24 y/o pregnant female c/o fingernail injury from almost 3 weeks ago not improving, as well as numbness to part of the finger. She was seen by me initially in the ED for a fingernail avulsion of the left fifth digit on 9/18. Her fingernail and acrylic nail were avulsed from the nail bed and angulated up, still attached at the cuticle. She had a digital block performed with the fingernail being reduced down onto the nail bed. She reports no worsening of pain, but the nail is still attached at the cuticle and not the nail bed. She also feels a patch of numbness to the medial dorsal aspect of the distal portion of the 5th digit. She has normal flexion and extension of the digit. She denies bleeding, swelling, color change.           Review of patient's allergies indicates:   Allergen Reactions    Clindamycin Anaphylaxis and Hives     Past Medical History:   Diagnosis Date    Diabetes mellitus type I     Heart murmur     Sickle cell trait      Past Surgical History:   Procedure Laterality Date    cyst removed on buttox  12/2011    DELIVERY-CEASAREAN SECTION N/A 10/16/2014    Performed by Rudy Case MD at [facility] L[facility]D OR    DILATION AND CURETTAGE OF UTERUS  05/17/13    I&D VULVA ABCESS Left 7/23/2014    Performed by Rudy Case MD at [facility] OR    PILONIDAL CYST DRAINAGE  2014     Family History   Problem Relation Age of Onset    Diabetes Paternal Grandmother     Hypertension Paternal Grandmother     Asthma Brother     Thyroid disease Mother      Social History     Tobacco Use    Smoking status: Never Smoker    Smokeless tobacco: Never Used   Substance Use Topics    Alcohol use: No    Drug use: No     Review of Systems   Constitutional: Negative for " fever.   Gastrointestinal: Negative for abdominal pain.   Genitourinary: Negative for dysuria, flank pain, pelvic pain, vaginal bleeding and vaginal pain.   Skin: Negative for color change, pallor, rash and wound.   Neurological: Positive for numbness (left 5th finger).       Physical Exam     Initial Vitals [10/07/18 0849]   BP Pulse Resp Temp SpO2   (!) 107/55 84 18 98.3 °F (36.8 °C) 97 %      MAP       --         Physical Exam    Vitals reviewed.  Constitutional: She appears well-developed and well-nourished. She is not diaphoretic. No distress.   HENT:   Head: Normocephalic and atraumatic.   Right Ear: External ear normal.   Left Ear: External ear normal.   Nose: Nose normal.   Eyes: Conjunctivae are normal. No scleral icterus.   Neck: Normal range of motion. Neck supple.   Cardiovascular: Normal rate, regular rhythm and intact distal pulses.   Pulmonary/Chest: No respiratory distress.   Musculoskeletal: Normal range of motion. She exhibits no edema or tenderness.   Neurological: She is alert and oriented to person, place, and time. She has normal strength. A sensory deficit is present.   Distal left 5th finger with mild numbness to the medial dorsal aspect.    Skin: Skin is warm and dry.   Left 5th finger with healing fingernail avulsion. Nail bed is pink and dry with nail attached only to the proximal aspect and cuticle.          ED Course   Procedures  Labs Reviewed - No data to display       Imaging Results    None          Medical Decision Making:   ED Management:  24 y/o pregnant female, 13 weeks, with fingernail avulsion from 3 weeks ago and finger numbness. Her nail bed is  from the nail and healing with the nail still loosely attached proximally. There is no evidence of infection or bony injury. She has a small area of numbness to the finger, which may be 2/2 injury vs digital block. She has full ROM and strength of the finger. We discussed options, including attempt to remove the nail vs  waiting for it to fall off. Initially she wanted to have it removed, and after local regional anesthetic and an initial attempt to remove the nail was uncomfortable, she declined and elected to leave it attached. I trimmed the nail shorter and dressed the finger. I will have her follow up with hand specialist for further evaluation. She is comfortable with the plan. No abdominal pain, or findings to suggest obstetrical emergency.                       Clinical Impression:   The encounter diagnosis was Fingernail injury, left, initial encounter.                             Jass Guzman PA-C  10/07/18 [id number]

## 2018-10-23 ENCOUNTER — ROUTINE PRENATAL (OUTPATIENT)
Dept: OBSTETRICS AND GYNECOLOGY | Facility: CLINIC | Age: 23
End: 2018-10-23
Payer: MEDICAID

## 2018-10-23 VITALS
DIASTOLIC BLOOD PRESSURE: 64 MMHG | WEIGHT: 164.25 LBS | SYSTOLIC BLOOD PRESSURE: 124 MMHG | BODY MASS INDEX: 32.08 KG/M2

## 2018-10-23 DIAGNOSIS — Z3A.15 15 WEEKS GESTATION OF PREGNANCY: Primary | ICD-10-CM

## 2018-10-23 DIAGNOSIS — O24.012 TYPE 1 DIABETES MELLITUS AFFECTING PREGNANCY IN SECOND TRIMESTER, ANTEPARTUM: ICD-10-CM

## 2018-10-23 PROCEDURE — 99214 OFFICE O/P EST MOD 30 MIN: CPT | Mod: TH,S$PBB,, | Performed by: OBSTETRICS & GYNECOLOGY

## 2018-10-23 PROCEDURE — 99213 OFFICE O/P EST LOW 20 MIN: CPT | Mod: PBBFAC,TH | Performed by: OBSTETRICS & GYNECOLOGY

## 2018-10-23 PROCEDURE — 99999 PR PBB SHADOW E&M-EST. PATIENT-LVL III: CPT | Mod: PBBFAC,,, | Performed by: OBSTETRICS & GYNECOLOGY

## 2018-10-23 NOTE — PROGRESS NOTES
OB here for routine exam.  Pt feeling better from nausea/vomiting.  She also states that her diabetes is better as well. [email or web address]

## 2018-10-23 NOTE — PROGRESS NOTES
Has had a difficult time keeping her glucose down  Went to Diabetic teaching.  But not learning much  Eating irregularly.  Not taking insulin regularly    Discussed with patient plan  Strict time and schedule discussed    Will resume insulin regimen.  She has been taking about 30 units daily  With breakfast, 12uNPH and 6u of Regular  With dinner, 6 u of Regular insulin  At bedtime, 6 u of NPH    Back next week.  Compliance stressed.    Will again refer to MFM    Total time: 20 min

## 2018-11-01 ENCOUNTER — ROUTINE PRENATAL (OUTPATIENT)
Dept: OBSTETRICS AND GYNECOLOGY | Facility: CLINIC | Age: 23
End: 2018-11-01
Payer: MEDICAID

## 2018-11-01 ENCOUNTER — LAB VISIT (OUTPATIENT)
Dept: LAB | Facility: HOSPITAL | Age: 23
End: 2018-11-01
Attending: OBSTETRICS & GYNECOLOGY
Payer: MEDICAID

## 2018-11-01 VITALS — WEIGHT: 164 LBS | DIASTOLIC BLOOD PRESSURE: 68 MMHG | SYSTOLIC BLOOD PRESSURE: 120 MMHG | BODY MASS INDEX: 32.03 KG/M2

## 2018-11-01 DIAGNOSIS — Z3A.16 16 WEEKS GESTATION OF PREGNANCY: ICD-10-CM

## 2018-11-01 DIAGNOSIS — O24.012 TYPE 1 DIABETES MELLITUS AFFECTING PREGNANCY IN SECOND TRIMESTER, ANTEPARTUM: ICD-10-CM

## 2018-11-01 DIAGNOSIS — Z3A.16 16 WEEKS GESTATION OF PREGNANCY: Primary | ICD-10-CM

## 2018-11-01 PROCEDURE — 36415 COLL VENOUS BLD VENIPUNCTURE: CPT

## 2018-11-01 PROCEDURE — 99999 PR PBB SHADOW E&M-EST. PATIENT-LVL II: CPT | Mod: PBBFAC,,, | Performed by: OBSTETRICS & GYNECOLOGY

## 2018-11-01 PROCEDURE — 99212 OFFICE O/P EST SF 10 MIN: CPT | Mod: PBBFAC,TH | Performed by: OBSTETRICS & GYNECOLOGY

## 2018-11-01 PROCEDURE — 99213 OFFICE O/P EST LOW 20 MIN: CPT | Mod: TH,S$PBB,, | Performed by: OBSTETRICS & GYNECOLOGY

## 2018-11-01 PROCEDURE — 81511 FTL CGEN ABNOR FOUR ANAL: CPT

## 2018-11-01 RX ORDER — AMOXICILLIN 500 MG/1
CAPSULE ORAL
Refills: 0 | COMMUNITY
Start: 2018-10-23 | End: 2018-12-04 | Stop reason: ALTCHOICE

## 2018-11-01 NOTE — PROGRESS NOTES
No new complaint.    No Accucheck record for review.  Hemoglobin A1c was 6.5 last month    Mat quad screen  Keep MFM appointment  Back in 2 weeks

## 2018-11-01 NOTE — PROGRESS NOTES
OB follow up glucose.  Pt recently seen by her PCP at [facility].  Pt needs new glucometer and having issue with insulin getting filled. alia

## 2018-11-06 LAB
# FETUSES US: NORMAL
2ND TRIMESTER 4 SCREEN PNL SERPL: NEGATIVE
2ND TRIMESTER 4 SCREEN SERPL-IMP: NORMAL
AFP MOM SERPL: 0.8
AFP SERPL-MCNC: 29.2 NG/ML
AGE AT DELIVERY: 24
B-HCG MOM SERPL: 1.1
B-HCG SERPL-ACNC: 39.9 IU/ML
FET TS 21 RISK FROM MAT AGE: NORMAL
GA (DAYS): 2 D
GA (WEEKS): 16 WK
GA METHOD: NORMAL
IDDM PATIENT QL: NORMAL
INHIBIN A MOM SERPL: 0.96
INHIBIN A SERPL-MCNC: 151.9 PG/ML
SMOKING STATUS FTND: NO
TS 18 RISK FETUS: NORMAL
TS 21 RISK FETUS: NORMAL
U ESTRIOL MOM SERPL: 1.51
U ESTRIOL SERPL-MCNC: 1.22 NG/ML

## 2018-11-19 ENCOUNTER — HOSPITAL ENCOUNTER (OUTPATIENT)
Dept: PERINATAL CARE | Facility: HOSPITAL | Age: 23
Discharge: HOME | End: 2018-11-19
Attending: OBSTETRICS & GYNECOLOGY
Payer: MEDICAID

## 2018-11-19 ENCOUNTER — TELEPHONE (OUTPATIENT)
Dept: PEDIATRIC CARDIOLOGY | Facility: CLINIC | Age: 23
End: 2018-11-19

## 2018-11-19 DIAGNOSIS — Z3A.15 15 WEEKS GESTATION OF PREGNANCY: ICD-10-CM

## 2018-11-19 DIAGNOSIS — O26.90 PREGNANCY, COMPLICATIONS OF: Primary | ICD-10-CM

## 2018-11-19 DIAGNOSIS — O24.012 TYPE 1 DIABETES MELLITUS AFFECTING PREGNANCY IN SECOND TRIMESTER, ANTEPARTUM: ICD-10-CM

## 2018-11-19 DIAGNOSIS — Z36.89 ENCOUNTER FOR FETAL ANATOMIC SURVEY: ICD-10-CM

## 2018-11-19 PROCEDURE — 99204 OFFICE O/P NEW MOD 45 MIN: CPT | Mod: 25,TH,S$PBB, | Performed by: OBSTETRICS & GYNECOLOGY

## 2018-11-19 PROCEDURE — 76811 OB US DETAILED SNGL FETUS: CPT | Mod: 26,,, | Performed by: OBSTETRICS & GYNECOLOGY

## 2018-11-19 PROCEDURE — 76811 OB US DETAILED SNGL FETUS: CPT

## 2018-11-19 NOTE — TELEPHONE ENCOUNTER
Spoke with pt in regards to fetal echo appointment. Scheduled on 12/17/18 with Dr. Martin at 2 PM. Confirmed location with pt. Office number given for further questions/concerns. Will mail out appointment reminder.

## 2018-11-20 DIAGNOSIS — O24.012 TYPE 1 DIABETES MELLITUS AFFECTING PREGNANCY IN SECOND TRIMESTER, ANTEPARTUM: ICD-10-CM

## 2018-11-20 DIAGNOSIS — O26.90 PREGNANCY, COMPLICATIONS OF: Primary | ICD-10-CM

## 2018-11-20 DIAGNOSIS — Z3A.19 19 WEEKS GESTATION OF PREGNANCY: ICD-10-CM

## 2018-11-20 NOTE — PROGRESS NOTES
Ms. Schmid is a 24y/o  at 18 and 5 weeks gestation based on LMP agreeing with 11 week scan who presents for consultation due to fetal anatomic survey and diabetes. Patient has had type 1 diabetes since  and was treated via my chart by endocrine at [facility] until her insurance changed. She has not had any recent diabetes hospitalizations or DKA per her report. She reports her HgbA1c was about 10 prior to conception (8.1 is noted in the chart). She has been taking Basaglar 36 units every am and Humalog 12-14 units with meals depending on what she eats. She eats different amounts of food and has not been eating 3 snacks. She has only been checking fasting and preprandial blood sugars. She has not been checking 2am blood sugars regularly but feels she is going low. Dr. Case's notes list NPH and humalog at different doses. She is not taking NPH.    PMHx: Type I diabetes              Overweight              Sickle cell trait              ? Heart murmur (in chart)-patient did not mention              Fingernail removal  PSHx: csection (chart also notes d an c and pilonidal cyst removal)  Allergies: clindamycin  Meds: Humalog and Basaglar             PNV             Promethazine as needed  Social: denies smoking, alcohol, drug use  POBHx: TAB 2013-5 weeks-medication                 Csection 37 and 1 weeks due to decels on NST                 With birthweight of 6 pounds 9.8 ounces  Family history birth defects: none, she reports her son has sickle cell disease; different FOB this pregnancy she reports FOB is negative for trait and disease (no records)    Quad negative  HgbA1c 7.1  Sickle cell trait  Gluc 274 on chem panel, Cr 0.6, AST 20, ALT 28, Plt 351  MCV 80, Hgb 13.6  EKG 2017-normal but significant changes    Sugar log over past week:  Fasting 134-189  Pre breakfast: 237, 178, 53, 119, 189, 215, 93, 75  Pre lunch: 128, 88, 108, 95, 169, 158, 84, 172  Pre dinner: one value recorded of  125    A/P:  1. IUP at 18 and 5    2. Diabetes:Today the patient was counseled on the risks of diabetes in pregnancy. I reviewed the physiologic effects of pregnancy on diabetes and I stressed with the patient the need for meticulous glucose control. I discussed with the patient the increased risks of fetal  Structural anomalies, macrosomia, prematurity, shoulder dystocia,  hyperbilirubinemia and electrolyte issues, pulmonary immaturity and sudden stillbirth especially in those patients with poor glucose control. I also discussed with the patient the need for increased fetal surveillance with serial fetal growth assessments and third trimester fetal testing. I also reviewed the possibility of need for early delivery in those with poor glucose control or evidence of fetal growth abnormalities.      -Referral to a dietician is recommended. Primary ob should refer as soon as possible. Noted in chart that patient had actually seen diabetic education. Messaged  to refer patient back to education.  -Patient counseled on need to eat regular meals and snacks with similar amounts of carbohydrates and food at these meals  -Diet and/or medication should be used to keep fasting glucose levels <95 mg/dL and 2 hour postprandial levels <120 mg/dL. Given the patient's type 1 state, she will also need to check preprandial glucoses and a 2-3 am glucose so that we may have a better idea how to adjust her insulin.  -Insulin is the gold standard medication for glucose control.  -The patient has been under suboptimal control but is improved from prepregnancy. She is aware of the increased risk of anomalies with elevated hemoglobin A1c preconception.  The patient reports no recent hospitalizations for DKA or diabetes control. With respect to her regimen, I recommend she check fasting, preprandial glucoses, 2 hour post prandial and a 2-3 am glucose. I could not make adjustments as her eating pattern varied and she had not  checked values at the appropriate times.  She was given blood sugar log sheets for our office.    -In those patients with diabetes existing prior to pregnancy we recommend a late first trimester ultrasound to look for any obvious fetal anomalies and NT if desired, a detailed fetal anatomic survey at 19 to 20 weeks gestation, and a fetal  echocardiogram around 22-24 weeks  gestation to rule out congenital heart disease. Fetal echo was ordered by [facility]K.  -Periodic fetal growth assessments should be performed to assess for growth abnormalities and to assess the fetal anomalies.  -Secondary to the high incidence of preeclampsia in patients with pregestational diabetes we recommend primary ob perform a baseline assessment of renal function with a 24 hour urine for protein and creatinine clearance or a baseline urine P/C. Baseline preeclampsia labs (CBC, CMP, creatinine) should also be done.  -The patient should have an eye exam. I would also recommend a podiatric exam. Primary ob to refer  -Given weight and diabetes and history of a heart murmur, an ekg, maternal echocardiogram, and cardiology referral  are recommended with primary ob to schedule.  -A baby aspirin daily 81mg PO is recommended for preeclampsia prevention after 12 to 14 weeks gestation.  -Supplemental folic acid 4mg PO daily is typically recommended.  -Recommend primary ob recheck hemoglobin A1c after treatment for a few months.  -With type 1 diabetes, recommend primary ob check TSH for a baseline.  - surveillance is recommended at 32 weeks twice weekly through primary ob office given her blood sugar issues. Recommend primary ob perform BPP alternating with NST on Monday/Thursday or Tuesday/Friday schedule.  With respect to delivery timing:              -We will need to assess as pregnancy progresses but given current glucose control will likely be at 38 weeks gestation or earlier if indicated.  -Recommend consideration of earlier delivery if IUGR,  HTN, or other complications  -Recommend offering csection for delivery is EFW is 4500g or more near the time of delivery    -Recommend return in 1 week for blood sugar evaluation.  Patient declined to return on Friday of this week. The patient was advised to call for blood sugars less than 70 or greater than 200 prior to her next appointment. If she notes nausea/vomiting, inability to tolerate PO, or concerns about being able to take her insulin that she should contact her provider or present to the OB ED. Patient may also need admission in the future for glucose patterning.    -Recommend primary ob confirm the patient has methods to treat hypoglycemia available.    Patient is aware of quad screen results. She is aware of options of amniocentesis as well and declines.    I also contacted the patient's pharmacy and they verified she was taking basaglar and humalog.          Also noted but not consulted on. If MFM consultation is desired for these issues, please contact our office.    3. Sickle cell trait: If FOB is carrier, there is 25% risk with each conception that the offspring will have sickle cell disease. Primary ob should notify peds at delivery. Amniocentesis is available if desired.    4. ? Heart murmur: Patient did not reveal. Recommend EKG and maternal echo and cards referral as above.      45 minutes was spent in face to face time with greater than half of that time spent in counseling and coordination of care.    Please see imaging tab for viewpoint ultrasound report.    Addendum: patient should also have urine culture every trimester. Dr. Case messaged.

## 2018-11-21 ENCOUNTER — TELEPHONE (OUTPATIENT)
Dept: OBSTETRICS AND GYNECOLOGY | Facility: CLINIC | Age: 23
End: 2018-11-21

## 2018-11-21 ENCOUNTER — TELEPHONE (OUTPATIENT)
Dept: MATERNAL FETAL MEDICINE | Facility: CLINIC | Age: 23
End: 2018-11-21

## 2018-11-21 NOTE — TELEPHONE ENCOUNTER
Spoke to pt. MFM appointment moved from [facility] to [facility] at 1240 pm on 12/17/18. Appointment reminder placed in the mail.     ----- Message from Sofia Lanza MD sent at 11/20/2018  6:47 PM CST -----  I think we need to move 12/17 appt to [facility] as patient appt at  is 1 and for echo is at [facility] at 2

## 2018-11-21 NOTE — TELEPHONE ENCOUNTER
Called pt back to see what she needed.  She apologized for missing her appt with Dr. Case.  She saw Dr. Lanza on Monday 11/19/18 and will see her again on 11/26/18.  Pt just want to know if it is ok for her to have an appt on 12/4/18 to follow up with Dr. Case or does she need a sooner appt.  Pt was told that as long as her diabetes is controlled and baby is fine according to Dr. Lanza, that appt on 12/4/18 with Dr. Case should be fine.  Message will be forward to Dr. Case. jtn    ----- Message from Zane Spear sent at 11/21/2018 11:31 AM CST -----  Contact: Jay [phone]  The patient is requesting a call back from the staff. Please call at your earliest convenience.

## 2018-11-26 ENCOUNTER — HOSPITAL ENCOUNTER (OUTPATIENT)
Dept: PERINATAL CARE | Facility: HOSPITAL | Age: 23
Discharge: HOME | End: 2018-11-26
Attending: OBSTETRICS & GYNECOLOGY
Payer: MEDICAID

## 2018-11-26 DIAGNOSIS — O26.90 PREGNANCY, COMPLICATIONS OF: Primary | ICD-10-CM

## 2018-11-26 DIAGNOSIS — O26.90 PREGNANCY, COMPLICATIONS OF: ICD-10-CM

## 2018-11-26 DIAGNOSIS — E10.9 TYPE 1 DIABETES MELLITUS WITHOUT COMPLICATION: ICD-10-CM

## 2018-11-26 PROCEDURE — 76815 OB US LIMITED FETUS(S): CPT | Mod: 26,,, | Performed by: OBSTETRICS & GYNECOLOGY

## 2018-11-26 PROCEDURE — 76815 OB US LIMITED FETUS(S): CPT

## 2018-11-26 PROCEDURE — 99214 OFFICE O/P EST MOD 30 MIN: CPT | Mod: 25,TH,, | Performed by: OBSTETRICS & GYNECOLOGY

## 2018-11-26 NOTE — PROGRESS NOTES
"Indication  ========    F/U Consultation/ FHTs/BS Check.    History  ======    Risk Factors  History risk factors: Diabetes mellitus  Details: Type I    Maternal Assessment  =================    Weight 76 kg  Weight (lb) 167 lb  BP syst 120 mmHg  BP diast 65 mmHg    Method  ======    Transabdominal ultrasound examination, 2D Color Doppler, [place]on E6. View: Good view.    Pregnancy  =========    Dahl pregnancy. Number of fetuses: 1.    Dating  ======    Cycle: regular cycle  GA by "stated dating" 19 w + 5 d  RIKA by "stated dating": 2019  Assigned: Dating performed on 2018, based on the external assessment  Assigned GA 19 w + 5 d  Assigned RIKA: 2019    General Evaluation  ==============    Cardiac activity: present.  bpm.  Fetal movements: visualized.  Presentation: cephalic.  Placenta: anterior.    Consultation  ==========    Type: Follow up blood sugar check.  Ms. Schmid presents for a follow up blood sugar check.  Over the past week    Fastin, 167, 179, 190, 119, 141  2 hour post breakfast:98, 156, (pre 78/post 150), 130, 134  Pre and post lunch 68/196, 90/157, 161, 120/108, 68/120,64/184  pre and post dinner 197/142, 186, 129, 108/157, 132, 102  2 am: 62, 134, 58, 172, 122, 84    Patient would drink apple juice at the 58 and 62. Discussed milk as better alternative.  She eats lunch late at sometimes.  She does not always eat a full bedtime snack    Takes Basaglar 36 units at 7 am  Eats breakfast at 8 am and takes 12-14 units of humalog  Eats lunch at 12 but sometimes late and that is why prelunch sometimes low and typically 12 units of humalog  Eats dinner at 8-9 pm an typically takes 14 units of humalog  Snack at 10 pm  Bed about 11 pm    Has glucose tabs and glucagon kit. Has someone at home with her    PLAN:  We will keep Basaglar 36 units in the am and Humalog 12-14 units with meals  Adding Basaglar 4 units at around 7 pm to try to help fasting (did not want to do too late in " evening with rare middle of night hypoglycemia) this  may also help her post dinner values. Someone will be at home with the patient. Will need titration of meal dosing but do not want to alter too  much of regimen at once.  We discussed that inpatient management of blood sugars may be helpful to have closer eye and be able to adjust.  She wants to attempt 2 nights of this as outpatient and see if improvement.  If she has continued hyperglycemia or hypoglycemia, she will come in as inpatient for insulin management.  She was given precautions.  She will see diabetes education tomorrow.  She will eat an appropriate snack at bedtime and will eat 3 snacks during day.  She is also going to try to eat lunch at a more predictable hour.  Follow up with MFM in one week for blood sugar check.  Recommend primary ob order studies recommended in prior consult note.  A consideration may be transition to NPH, however, this may lead to more middle of the night hypoglycemia but would be a consideration. We  also discussed that adjustment of the am basaglar is likely unable to affect fasting but is also a consideration in the future.    25 minutes was spent in face to face time with greater than half of that time spent in counseling and coordination of care.      Impression  =========    Dahl live intrauterine pregnancy.  Limited scan for cardiac activity.    Recommendation  ==============    Added pm Basaglar.  Keep diabetic education appointment.  Given precautions.  Recommend inpatient admission if persistent hyperglycemia or evidence of hypoglycemia.  Recommend primary ob see recommendations in original consult.  Follow up with MFM in one week for blood sugar check-patient may come in for inpatient management if blood sugar control issues prior to  this.

## 2018-11-26 NOTE — ADDENDUM NOTE
Encounter addended by: Monique Garcia RN on: 11/26/2018 9:24 AM   Actions taken: Charge Capture section accepted

## 2018-11-27 DIAGNOSIS — O26.90 PREGNANCY, COMPLICATIONS OF: Primary | ICD-10-CM

## 2018-12-03 ENCOUNTER — HOSPITAL ENCOUNTER (OUTPATIENT)
Dept: PERINATAL CARE | Facility: HOSPITAL | Age: 23
Discharge: HOME | End: 2018-12-03
Attending: OBSTETRICS & GYNECOLOGY
Payer: MEDICAID

## 2018-12-03 DIAGNOSIS — O26.90 PREGNANCY, COMPLICATIONS OF: ICD-10-CM

## 2018-12-03 DIAGNOSIS — O24.312 PRE-EXISTING DIABETES MELLITUS DURING PREGNANCY IN SECOND TRIMESTER: ICD-10-CM

## 2018-12-03 PROCEDURE — 76815 OB US LIMITED FETUS(S): CPT | Mod: 26,,, | Performed by: OBSTETRICS & GYNECOLOGY

## 2018-12-03 PROCEDURE — 76815 OB US LIMITED FETUS(S): CPT

## 2018-12-03 PROCEDURE — 99213 OFFICE O/P EST LOW 20 MIN: CPT | Mod: 25,TH,S$PBB, | Performed by: OBSTETRICS & GYNECOLOGY

## 2018-12-03 NOTE — PROGRESS NOTES
"Indication  ========    F/U Consultation: BS Check.    History  ======    Risk Factors  History risk factors: Diabetes mellitus  Details: Type I    Maternal Assessment  =================    Weight 77 kg  Weight (lb) 169 lb  BP syst 112 mmHg  BP diast 58 mmHg    Method  ======    Transabdominal ultrasound examination, 2D Color Doppler. View: Sufficient.    Pregnancy  =========    Dahl pregnancy. Number of fetuses: 1.    Dating  ======    Cycle: regular cycle  GA by "stated dating" 20 w + 5 d  RIKA by "stated dating": 2019  Assigned: Dating performed on 2018, based on the external assessment  Assigned GA 20 w + 5 d  Assigned IRKA: 2019    General Evaluation  ==============    Cardiac activity: present.  bpm.  Fetal movements: visualized.  Presentation: cephalic.  Placenta: anterior.    Consultation  ==========    Type: Follow up blood sugar check.  Patient doing well without complaints.  Feels doing better on insulin regimen.  Reports had 160 fasting one time as dropped lower in the middle of the night because she did not eat as much.  Current regimen is Basaglar 36 units am and 4 units in the pm and Humalog 12-14 units with meals (with dinner typically takes 14 units)    Fastin, 86, 160, 110  2 hour post breakfast: 189, 74, 151, 98, 78  2 hour post lunch: 120, 128, 126 (70), 107, 158  2 hour post dinner: 168, 176, 78, 190, 152    Plan:  Continue Basaglar 36 units in the am and 4 units in the pm  Humalog 12-14 units with breakfast and lunch and take 16 units humalog with dinner    Call if sugars less than 70 or greater than 200  Return to MFM in 1 week for blood sugar check  Advise primary ob reschedule diabetes education.  Keep follow up mfm appts and fetal echo.  Advise primary ob to order recommended studies in consult.    15 minutes was spent in face to face time with greater than half of that time spent in counseling and coordination of care.    Impression  =========    Dahl " live intrauterine pregnancy.  Limited exam for FHT-cardiac activity was documented.    Recommendation  ==============    Keep appointment with Dr. Case tomorrow.  Patient missed her 2nd diabetic education appointment-recommend Dr. Case reschedule.  Recommend primary ob order recommended studies from initial consult.  Follow up with MFM in week for blood sugar check and FHT.  Follow up with MFM in 2 weeks for blood sugar check, growth and suboptimal fetal anatomy.  Keep fetal echo appointment.  Insulin changed as above.

## 2018-12-03 NOTE — ADDENDUM NOTE
Encounter addended by: Monique Garcia RN on: 12/3/2018 10:46 AM   Actions taken: Charge Capture section accepted

## 2018-12-04 ENCOUNTER — ROUTINE PRENATAL (OUTPATIENT)
Dept: OBSTETRICS AND GYNECOLOGY | Facility: CLINIC | Age: 23
End: 2018-12-04
Payer: MEDICAID

## 2018-12-04 ENCOUNTER — LAB VISIT (OUTPATIENT)
Dept: LAB | Facility: HOSPITAL | Age: 23
End: 2018-12-04
Attending: OBSTETRICS & GYNECOLOGY
Payer: MEDICAID

## 2018-12-04 VITALS
BODY MASS INDEX: 32.94 KG/M2 | DIASTOLIC BLOOD PRESSURE: 64 MMHG | SYSTOLIC BLOOD PRESSURE: 108 MMHG | WEIGHT: 168.63 LBS

## 2018-12-04 DIAGNOSIS — O24.012 TYPE 1 DIABETES MELLITUS AFFECTING PREGNANCY IN SECOND TRIMESTER, ANTEPARTUM: ICD-10-CM

## 2018-12-04 DIAGNOSIS — O26.90 PREGNANCY, COMPLICATIONS OF: Primary | ICD-10-CM

## 2018-12-04 DIAGNOSIS — O34.219 PREVIOUS CESAREAN SECTION COMPLICATING PREGNANCY, ANTEPARTUM CONDITION OR COMPLICATION: ICD-10-CM

## 2018-12-04 DIAGNOSIS — Z3A.21 21 WEEKS GESTATION OF PREGNANCY: ICD-10-CM

## 2018-12-04 DIAGNOSIS — Z3A.21 21 WEEKS GESTATION OF PREGNANCY: Primary | ICD-10-CM

## 2018-12-04 LAB
ALBUMIN SERPL BCP-MCNC: 2.6 G/DL
ALP SERPL-CCNC: 69 U/L
ALT SERPL W/O P-5'-P-CCNC: 9 U/L
ANION GAP SERPL CALC-SCNC: 4 MMOL/L
AST SERPL-CCNC: 10 U/L
BASOPHILS # BLD AUTO: 0.01 K/UL
BASOPHILS NFR BLD: 0.1 %
BILIRUB SERPL-MCNC: 0.2 MG/DL
BUN SERPL-MCNC: 4 MG/DL
CALCIUM SERPL-MCNC: 8.9 MG/DL
CHLORIDE SERPL-SCNC: 107 MMOL/L
CO2 SERPL-SCNC: 25 MMOL/L
CREAT SERPL-MCNC: 0.8 MG/DL
CREAT UR-MCNC: 81.1 MG/DL
DIFFERENTIAL METHOD: ABNORMAL
EOSINOPHIL # BLD AUTO: 0.2 K/UL
EOSINOPHIL NFR BLD: 1.7 %
ERYTHROCYTE [DISTWIDTH] IN BLOOD BY AUTOMATED COUNT: 13 %
EST. GFR  (AFRICAN AMERICAN): >60 ML/MIN/1.73 M^2
EST. GFR  (NON AFRICAN AMERICAN): >60 ML/MIN/1.73 M^2
GLUCOSE SERPL-MCNC: 148 MG/DL
HCT VFR BLD AUTO: 37.9 %
HGB BLD-MCNC: 13.2 G/DL
LYMPHOCYTES # BLD AUTO: 2.4 K/UL
LYMPHOCYTES NFR BLD: 18.7 %
MCH RBC QN AUTO: 28 PG
MCHC RBC AUTO-ENTMCNC: 34.8 G/DL
MCV RBC AUTO: 80 FL
MONOCYTES # BLD AUTO: 0.5 K/UL
MONOCYTES NFR BLD: 4.1 %
NEUTROPHILS # BLD AUTO: 9.7 K/UL
NEUTROPHILS NFR BLD: 75.4 %
PLATELET # BLD AUTO: 318 K/UL
PMV BLD AUTO: 9.3 FL
POTASSIUM SERPL-SCNC: 4.1 MMOL/L
PROT SERPL-MCNC: 6.5 G/DL
PROT UR-MCNC: <7 MG/DL
PROT/CREAT UR: NORMAL MG/G{CREAT}
RBC # BLD AUTO: 4.72 M/UL
SODIUM SERPL-SCNC: 136 MMOL/L
TSH SERPL DL<=0.005 MIU/L-ACNC: 0.92 UIU/ML
WBC # BLD AUTO: 12.86 K/UL

## 2018-12-04 PROCEDURE — 99999 PR PBB SHADOW E&M-EST. PATIENT-LVL III: CPT | Mod: PBBFAC,,, | Performed by: OBSTETRICS & GYNECOLOGY

## 2018-12-04 PROCEDURE — 83036 HEMOGLOBIN GLYCOSYLATED A1C: CPT

## 2018-12-04 PROCEDURE — 80053 COMPREHEN METABOLIC PANEL: CPT

## 2018-12-04 PROCEDURE — 85025 COMPLETE CBC W/AUTO DIFF WBC: CPT

## 2018-12-04 PROCEDURE — 99213 OFFICE O/P EST LOW 20 MIN: CPT | Mod: PBBFAC,TH | Performed by: OBSTETRICS & GYNECOLOGY

## 2018-12-04 PROCEDURE — 36415 COLL VENOUS BLD VENIPUNCTURE: CPT

## 2018-12-04 PROCEDURE — 82570 ASSAY OF URINE CREATININE: CPT

## 2018-12-04 PROCEDURE — 99213 OFFICE O/P EST LOW 20 MIN: CPT | Mod: TH,S$PBB,, | Performed by: OBSTETRICS & GYNECOLOGY

## 2018-12-04 PROCEDURE — 84443 ASSAY THYROID STIM HORMONE: CPT

## 2018-12-04 NOTE — PROGRESS NOTES
No new complaint    Fasting  with 2hr postprandial  and 3AM Accucheck .  Insulin by Dr Lanza.    Discussed [facility]'s recommendation.    . Baby aspirin  . Fetal echo scheduled for 12/17/2018  . CBC, CMP, TSH today.  . P/C ratio  . Eye and foot exam  . EKG, and maternal echo  . Folic acid  . NST twice a week with BPP once a week beginning at 32 weeks    Keep M's appointment  Back in 2 weeks.

## 2018-12-05 LAB
ESTIMATED AVG GLUCOSE: 154 MG/DL
HBA1C MFR BLD HPLC: 7 %

## 2018-12-17 ENCOUNTER — CLINICAL SUPPORT (OUTPATIENT)
Dept: PEDIATRIC CARDIOLOGY | Facility: CLINIC | Age: 23
End: 2018-12-17
Attending: PEDIATRICS
Payer: MEDICAID

## 2018-12-17 ENCOUNTER — PROCEDURE VISIT (OUTPATIENT)
Dept: MATERNAL FETAL MEDICINE | Facility: CLINIC | Age: 23
End: 2018-12-17
Payer: MEDICAID

## 2018-12-17 ENCOUNTER — INITIAL CONSULT (OUTPATIENT)
Dept: MATERNAL FETAL MEDICINE | Facility: CLINIC | Age: 23
End: 2018-12-17
Payer: MEDICAID

## 2018-12-17 VITALS
SYSTOLIC BLOOD PRESSURE: 98 MMHG | SYSTOLIC BLOOD PRESSURE: 98 MMHG | BODY MASS INDEX: 33.37 KG/M2 | BODY MASS INDEX: 33.55 KG/M2 | HEIGHT: 60 IN | WEIGHT: 170.88 LBS | DIASTOLIC BLOOD PRESSURE: 58 MMHG | HEART RATE: 84 BPM | DIASTOLIC BLOOD PRESSURE: 58 MMHG | WEIGHT: 170.88 LBS

## 2018-12-17 DIAGNOSIS — O26.90 PREGNANCY, COMPLICATIONS OF: ICD-10-CM

## 2018-12-17 DIAGNOSIS — Z36.89 ENCOUNTER FOR ULTRASOUND TO ASSESS FETAL GROWTH: ICD-10-CM

## 2018-12-17 DIAGNOSIS — O24.312 PREEXISTING DIABETES COMPLICATING PREGNANCY IN SECOND TRIMESTER, ANTEPARTUM: Primary | ICD-10-CM

## 2018-12-17 DIAGNOSIS — O24.012 TYPE 1 DIABETES MELLITUS AFFECTING PREGNANCY IN SECOND TRIMESTER, ANTEPARTUM: Primary | ICD-10-CM

## 2018-12-17 PROCEDURE — 99999 PR PBB SHADOW E&M-EST. PATIENT-LVL III: CPT | Mod: PBBFAC,,, | Performed by: PEDIATRICS

## 2018-12-17 PROCEDURE — 99999 PR PBB SHADOW E&M-EST. PATIENT-LVL III: CPT | Mod: PBBFAC,,, | Performed by: OBSTETRICS & GYNECOLOGY

## 2018-12-17 PROCEDURE — 76825 ECHO EXAM OF FETAL HEART: CPT | Mod: PBBFAC | Performed by: PEDIATRICS

## 2018-12-17 PROCEDURE — 76827 ECHO EXAM OF FETAL HEART: CPT | Mod: PBBFAC | Performed by: PEDIATRICS

## 2018-12-17 PROCEDURE — 99203 OFFICE O/P NEW LOW 30 MIN: CPT | Mod: 25,S$PBB,, | Performed by: PEDIATRICS

## 2018-12-17 PROCEDURE — 99213 OFFICE O/P EST LOW 20 MIN: CPT | Mod: PBBFAC,TH,25 | Performed by: OBSTETRICS & GYNECOLOGY

## 2018-12-17 PROCEDURE — 99213 OFFICE O/P EST LOW 20 MIN: CPT | Mod: 25,S$PBB,TH, | Performed by: OBSTETRICS & GYNECOLOGY

## 2018-12-17 PROCEDURE — 99213 OFFICE O/P EST LOW 20 MIN: CPT | Mod: PBBFAC,25,27 | Performed by: PEDIATRICS

## 2018-12-17 PROCEDURE — 76827 ECHO EXAM OF FETAL HEART: CPT | Mod: 26,S$PBB,, | Performed by: PEDIATRICS

## 2018-12-17 PROCEDURE — 76825 ECHO EXAM OF FETAL HEART: CPT | Mod: 26,S$PBB,, | Performed by: PEDIATRICS

## 2018-12-17 PROCEDURE — 76816 OB US FOLLOW-UP PER FETUS: CPT | Mod: 26,S$PBB,, | Performed by: OBSTETRICS & GYNECOLOGY

## 2018-12-17 PROCEDURE — 93325 DOPPLER ECHO COLOR FLOW MAPG: CPT | Mod: 26,S$PBB,, | Performed by: PEDIATRICS

## 2018-12-17 PROCEDURE — 76816 OB US FOLLOW-UP PER FETUS: CPT | Mod: PBBFAC | Performed by: OBSTETRICS & GYNECOLOGY

## 2018-12-17 PROCEDURE — 93325 DOPPLER ECHO COLOR FLOW MAPG: CPT | Mod: PBBFAC | Performed by: PEDIATRICS

## 2018-12-17 NOTE — LETTER
December 18, 2018      Rudy Case MD  [address] [facility] [address]  [address]  [place] [address]           [facility] - Maternal Fetal Med  [address]  [facility] [address]  Phone: [phone]          Patient: Jay Schmid   MR Number: [id number]   YOB: 1995   Date of Visit: 12/17/2018       Dear Dr. Rudy Case:    Thank you for referring Jay Schmid to me for evaluation. Attached you will find relevant portions of my assessment and plan of care.    If you have questions, please do not hesitate to call me. I look forward to following Jay Schmid along with you.    Sincerely,    Yaya Caldwell MD    Enclosure  CC:  No Recipients    If you would like to receive this communication electronically, please contact [email or web address] or [phone] to request more information on [employer] access.    For providers and/or their staff who would like to refer a patient to [facility], please contact us through our one-stop-shop provider referral line, [facility], at [phone].    If you feel you have received this communication in error or would no longer like to receive these types of communications, please e-mail [email or web address]

## 2018-12-17 NOTE — PROGRESS NOTES
Patient to [facility] for her follow up consult for Type 1 Diabetes.     Patient's current insulin management includes:     Basaglar: 36/X/X/4       Humalo-X       Copy of blood sugar log made for Dr. Caldwell for review.     Changes following consult include:     Basaglar: 36/X/X/6    Humalo-/X

## 2018-12-18 DIAGNOSIS — E10.9 TYPE 1 DIABETES MELLITUS WITHOUT COMPLICATION: ICD-10-CM

## 2018-12-18 DIAGNOSIS — O26.92 COMPLICATION OF PREGNANCY IN SECOND TRIMESTER: ICD-10-CM

## 2018-12-18 DIAGNOSIS — O26.90 PREGNANCY, COMPLICATIONS OF: Primary | ICD-10-CM

## 2018-12-18 DIAGNOSIS — O24.312 PRE-EXISTING DIABETES MELLITUS DURING PREGNANCY IN SECOND TRIMESTER: ICD-10-CM

## 2018-12-18 NOTE — PROGRESS NOTES
"Indication  ========    f/u Consultation: Evaluation of fetal growth and BS check.    History  ======    Previous Outcomes  Preg. no. 1  Outcome: Termination of pregnancy - surgical  Preg. no. 2  Outcome: Live birth  Gest. age 37 w + 1 d  Gender: male  Details: c/s   3  Para 1  Dahl children born living (T) 1  Dahl children born (T) 1  Abortions (A) 1  Dahl living children (L) 1  Terminations 1  Risk Factors  History risk factors: Diabetes mellitus  Details: Type I    Pregnancy History  ==============    Maternal Lab Tests  Test: Quad/ Penta Screen  Result: Negative  Wants to know gender: yes    Maternal Assessment  =================    Weight 78 kg  Weight (lb) 172 lb  BP syst 98 mmHg  BP diast 58 mmHg    Method  ======    Voluson E10, Transabdominal ultrasound examination. View: Sufficient.    Pregnancy  =========    Dahl pregnancy. Number of fetuses: 1.    Dating  ======    Cycle: regular cycle  GA by "stated dating" 22 w + 5 d  RIKA by "stated dating": 2019  Ultrasound examination on: 2018  GA by U/S based upon: AC, BPD, Femur, HC  GA by U/S 22 w + 6 d  RIKA by U/S: 2019  Assigned: Dating performed on 2018, based on the external assessment  Assigned GA 22 w + 5 d  Assigned RIKA: 2019    General Evaluation  ==============    Cardiac activity: present.  bpm.  Fetal movements: visualized.  Presentation: cephalic.  Placenta: anterior.  Umbilical cord: 3 vessel cord.  Amniotic fluid: Amount of AF: normal amount. MVP 6.5 cm.    Fetal Biometry  ============    Fetal Biometry  BPD 53.8 mm 22w 3d Hadlock  OFD 71.6 mm 23w 6d Zuhair  .1 mm 22w 4d Hadlock  .5 mm 23w 6d Hadlock  Femur 39.6 mm 22w 5d Hadlock   g 53% Alex  Calculated by: Hadlock (BPD-HC-AC-FL)  EFW (lb) 1 lb  EFW (oz) 4 oz  Cephalic index 0.75  HC / AC 1.07  FL / BPD 0.74  FL / AC 0.21  MVP 6.5 cm   bpm    Fetal Anatomy  ===========    Cranium: normal  Posterior " fossa: normal  RVOT: normal  LVOT: normal  4-chamber view: 4-chamber normal, septum normal  Aortic arch: normal  Ductal arch: normal  Stomach: normal  Kidneys: normal  Bladder: normal  Sacral spine: normal  Gender: male  Wants to know gender: yes  Other: A full anatomy survey previously performed.    Consultation  ==========    Patient doing well without complaints.  Feels doing better on insulin regimen. She had a hemoglobin A1c on  which was 7.0. Her baseline creatinine is 0.8 in her PC  ratio was too low to calculate.  Current regimen is Basaglar 36 units am and 4 units in the pm and Humalog 12-14 units with breakfast and lunch and at dinner 16 units    Fastin-115  2 hour post breakfast:   2 hour post lunch: 107-181  2 hour post dinner:   2AM:     We discussed the importance of glycemic control and compliance with diet and her insulin regimen. Explained the impact of poor glycemic  control on pregnancy outcomes and fetal growth.    Follow-up ultrasound performed today. Fetal growth is at the 53rd percentile. We were able to complete the anatomic survey today and saw no  evidence of any gross structural malformations. She has her fetal echo scheduled for later in the day.    Plan:  1. Continue Basaglar 36 units in the am but increase to 6 units in the evening to try to lower her fasting blood sugars  2. Given the rise in her postprandial afternoon and evening blood glucose, I instructed her to continue Humalog 12 to 14 units with breakfast but  to increase to 14 to 16 units at lunch and increased 18 units at dinner.  3. Follow-up in 2 weeks for blood sugar check.  4. Repeat ultrasound for fetal growth in 4 weeks with blood sugar check at that time.  5. Initiate  testing at 32 weeks.    15 minutes was spent in face to face time with greater than half of that time spent in counseling and coordination of care.      Impression  =========    The fetal anatomic survey was  completed today, and no fetal structural abnormalities were noted. Interval fetal growth has been normal at the  53rd percentile, and the AFV is normal.    Recommendation  ==============    1. Continue Basaglar 36 units in the am but increase to 6 units in the evening to try to lower her fasting blood sugars  2. Given the rise in her postprandial afternoon and evening blood glucose, I instructed her to continue Humalog 12 to 14 units with breakfast but  to increase to 14 to 16 units at lunch and increased 18 units at dinner.  3. Follow-up in 2 weeks for blood sugar check.  4. Repeat ultrasound for fetal growth in 4 weeks with blood sugar check at that time.  5. Initiate  testing at 32 weeks.

## 2018-12-19 NOTE — PROGRESS NOTES
"Ms. Schmid  is a 23 y.o. year old  , referred by Dr. Lanza because of the history of diabetes mellitus.    The patient presented at approximately 22 5/7 weeks gestation (Patient's last menstrual period was 2018.).  The patient denied any complaints.    Past medical history: Type I DM since 12 y/o.  Past surgical history: S/P C/S x 1..  Past gestational history: S/P spontaneous  x 1.  The patient has a healthy son (5 y/o).  The child has Hgb SS.    Family history: Negative for congenital heart disease, and sudden death during childhood.    Medications:   Outpatient Encounter Medications as of 2018   Medication Sig Dispense Refill    BD ULTRA-FINE GABRIELE PEN NEEDLE 32 gauge x 5/32" Ndle USE TO INJECT QID  6    blood glucose strip-disp meter Kit Preferred by insurance, use as directed. 1 kit 0    blood sugar diagnostic Strp To check BG 6x times daily, to use with insurance preferred meter 200 strip 11    HUMALOG KWIKPEN INSULIN 100 unit/mL InPn pen INJECT 10 UNITS THREE TIMES DAILY BEFORE A MEAL PLUS CORRECTION SCALE. MAX DOSE 60 UNITS. (Patient taking differently: 12-14 units at breakfast and lunch/16 units with dinner 18) 15 mL 6    insulin glargine (BASAGLAR KWIKPEN U-100 INSULIN) 100 unit/mL (3 mL) InPn pen Inject 35 Units into the skin once daily. (Patient taking differently: Inject 36 Units into the skin once daily. ) 4 Box 3    insulin lispro (HUMALOG KWIKPEN) 100 unit/mL InPn pen Inject 6 units w/ breakfast, 10 units w/ lunch and dinner plus scale 150-200+1, 201-250+2, 251-300+3, 301-350+4. 1 Box 11    lancets Misc To check BG 6x times daily, to use with insurance preferred meter 200 each 11    pen needle, diabetic (BD ULTRA-FINE GABRIELE PEN NEEDLE) 32 gauge x 5/32" Ndle USE TO INJECT FOUR TIMES DAILY 100 each 11    promethazine (PHENERGAN) 25 MG tablet Take 1 tablet (25 mg total) by mouth every 4 (four) hours. 30 tablet 1    VITAMIN B-6 25 MG tablet TK 1 T PO Q 6 H PRF " NAUSEA  3     No facility-administered encounter medications on file as of 12/17/2018.        Allergies: Clindamycin    Blood pressure (!) 98/58, pulse 84, height 5' (1.524 m), weight 77.5 kg (170 lb 13.7 oz), last menstrual period 07/11/2018.    Fetal echocardiogram revealed a four chamber fetal heart with situs solitus.  The ventricles appeared to be equal in size.  The contractility of both ventricles was good.  The fetal heart rate was within the normal range, and regular.  The interventricular septum appeared to be intact.  There were normally related great arteries seen.  The ductal and aortic arch were well visualized, and appeared to be widely patent.  There was no pleural or pericardial effusion seen.    Doppler analysis revealed a three vessel umbilical cord, with normal flow patterns, and velocities by Doppler.  There was a normal flow pattern seen in the ductus venosus.  There was evidence of normal systemic, and pulmonary venous return seen.  There was a normal right to left shunt seen across the foramen ovale.  There were normal inflow patterns seen across the AV-valves, without significant insufficiency.  There was no ventricular level shunt seen.  The right and left ventricular outflow tract, and ductal and aortic arch appeared to be unobstructed.    Impression:  It is our impression that Ms. Schmid had a normal fetal echocardiogram.  As you know, small defects, and coarctation of the aorta cannot always be ruled out on fetal echocardiogram.  We discussed our findings with the patient, reviewed our images, and answered her questions. We also discussed the limitations of fetal echocardiography, but emphasized that her child appears to have normal cardiac anatomy.  No further follow up is scheduled in our clinic, but, of course, we will always be available to reevaluate this patient, if needed.    The above information was discussed in detail including the use of diagrams, with 30 minutes of total face  to face time, with greater than 50% with counseling and coordination of care.  The discussion of the diagnosis and treatment options is as described above.      Time spent: 30 minutes, 50% dedicated to counseling.

## 2018-12-24 ENCOUNTER — HOSPITAL ENCOUNTER (EMERGENCY)
Facility: HOSPITAL | Age: 23
Discharge: HOME | End: 2018-12-24
Attending: EMERGENCY MEDICINE
Payer: MEDICAID

## 2018-12-24 VITALS
RESPIRATION RATE: 18 BRPM | DIASTOLIC BLOOD PRESSURE: 68 MMHG | HEIGHT: 64 IN | BODY MASS INDEX: 29.02 KG/M2 | TEMPERATURE: 98 F | WEIGHT: 170 LBS | SYSTOLIC BLOOD PRESSURE: 119 MMHG | OXYGEN SATURATION: 100 % | HEART RATE: 81 BPM

## 2018-12-24 DIAGNOSIS — E10.65 TYPE 1 DIABETES MELLITUS WITH HYPERGLYCEMIA: ICD-10-CM

## 2018-12-24 DIAGNOSIS — Z76.0 MEDICATION REFILL: Primary | ICD-10-CM

## 2018-12-24 LAB
POCT GLUCOSE: 103 MG/DL (ref 70–110)
POCT GLUCOSE: 80 MG/DL (ref 70–110)

## 2018-12-24 PROCEDURE — 82962 GLUCOSE BLOOD TEST: CPT | Mod: 59

## 2018-12-24 PROCEDURE — 99283 EMERGENCY DEPT VISIT LOW MDM: CPT

## 2018-12-24 RX ORDER — INSULIN LISPRO 100 [IU]/ML
10 INJECTION, SOLUTION INTRAVENOUS; SUBCUTANEOUS
Qty: 9 ML | Refills: 0 | Status: SHIPPED | OUTPATIENT
Start: 2018-12-24 | End: 2019-01-02 | Stop reason: SDUPTHER

## 2018-12-24 NOTE — ED PROVIDER NOTES
Encounter Date: 2018    This is a SORT/MSE of a 23 y.o. female presenting to the ED with c/o being out of insulin. Hx of DM I. Last dose of insulin this morning - humalog.  mg/dL in triage. Pt 23w5d by RIKA. Care will be transferred to an alternate provider when patient is roomed for a full evaluation and final disposition. ADELAIDE Valentine, RONNELLP-C 2018 3:54 PM       History     Chief Complaint   Patient presents with    Medication Refill     reports running out of insulin today, and want to get medication refill     Chief complaint:  Medication refill    HPI:  This is a 23-year-old pregnant female who presents to the ED with request for a refill for her insulin.  She is approximately 25 weeks pregnant.  She states she was unable to see her maternal fetal medicine specialist as clinic is closed today and she ran out of insulin.  She is asymptomatic but also states she is hungry because she has not eaten today.  She requests a refill for her Humalog KwikPen.      The history is provided by the patient.     Review of patient's allergies indicates:   Allergen Reactions    Clindamycin Anaphylaxis and Hives     Past Medical History:   Diagnosis Date    Diabetes mellitus type I     Heart murmur     Sickle cell trait      Past Surgical History:   Procedure Laterality Date     SECTION      cyst removed on buttox  2011    DELIVERY-CEASAREAN SECTION N/A 10/16/2014    [facility]formed [facility] at [facility] L[facility]D OR    DILATION AND CURETTAGE OF UTERUS  13    I&D VULVA ABCESS Left 2014    [facility] at [facility] OR    PILONIDAL CYST DRAINAGE       Family History   Problem Relation Age of Onset    Diabetes Paternal Grandmother     Hypertension Paternal Grandmother     Asthma Brother     Thyroid disease Mother     Congenital heart disease Son         hole in heart    Cardiomyopathy Neg Hx     Arrhythmia Neg Hx     Heart attacks under age 50 Neg Hx      Pacemaker/defibrilator Neg Hx      Social History     Tobacco Use    Smoking status: Never Smoker    Smokeless tobacco: Never Used   Substance Use Topics    Alcohol use: No    Drug use: No     Review of Systems   Constitutional: Negative for fever.   HENT: Negative for sore throat.    Respiratory: Negative for shortness of breath.    Cardiovascular: Negative for chest pain.   Gastrointestinal: Negative for abdominal pain, diarrhea, nausea and vomiting.   Endocrine: Negative for polydipsia, polyphagia and polyuria.   Genitourinary: Negative for difficulty urinating, dysuria and vaginal bleeding.   Musculoskeletal: Negative for back pain.   Skin: Negative for rash.   Neurological: Negative for weakness.   Hematological: Does not bruise/bleed easily.       Physical Exam     Initial Vitals   BP Pulse Resp Temp SpO2   12/24/18 1557 12/24/18 1557 12/24/18 1557 12/24/18 1700 12/24/18 1557   119/68 81 18 98.4 °F (36.9 °C) 100 %      MAP       --                Physical Exam    Constitutional: Vital signs are normal. She appears well-developed and well-nourished.  Non-toxic appearance.   Eyes: EOM are normal.   Neck: Full passive range of motion without pain. Neck supple. No neck rigidity.   Cardiovascular: Normal rate, S1 normal, S2 normal and normal heart sounds. Exam reveals no gallop.    No murmur heard.  Pulmonary/Chest: Effort normal and breath sounds normal. No tachypnea. She has no decreased breath sounds. She has no wheezes. She has no rhonchi. She has no rales.   Neurological: She is alert and oriented to person, place, and time. She has normal strength. Gait normal. GCS eye subscore is 4. GCS verbal subscore is 5. GCS motor subscore is 6.   Skin: Skin is warm and dry. No rash noted.         ED Course   Procedures  Labs Reviewed   POCT GLUCOSE   POCT GLUCOSE          Imaging Results    None          Medical Decision Making:   ED Management:  This is a 23-year-old pregnant female who presents to the ED with  request for refill for her insulin.  She is afebrile and well-appearing.  She is asymptomatic at this time.  Glucose 103 in triage.  Repeat glucose 80.  Crackers given as she states she is hungry.  Will refill insulin KwikPen as it was previously written.  Discharged home with instructions for supportive care and follow-up.  Return precautions given.                      Clinical Impression:   The primary encounter diagnosis was Medication refill. A diagnosis of Type 1 diabetes mellitus with hyperglycemia was also pertinent to this visit.      Disposition:   Disposition: Discharged  Condition: Stable                        Machelle Amos NP  12/24/18 2015

## 2018-12-24 NOTE — DISCHARGE INSTRUCTIONS
Please return to the ED for any new or worsening symptoms: chest pain, shortness of breath, loss of consciousness or any other concerns. Please follow up with primary care within in the week. You may also call [phone] for the [facility] same day appointment line.

## 2018-12-26 RX ORDER — INSULIN LISPRO 100 [IU]/ML
INJECTION, SOLUTION INTRAVENOUS; SUBCUTANEOUS
Qty: 15 ML | Refills: 0 | Status: SHIPPED | OUTPATIENT
Start: 2018-12-26 | End: 2018-12-31

## 2018-12-31 ENCOUNTER — ROUTINE PRENATAL (OUTPATIENT)
Dept: OBSTETRICS AND GYNECOLOGY | Facility: CLINIC | Age: 23
End: 2018-12-31
Payer: MEDICAID

## 2018-12-31 VITALS
WEIGHT: 171.94 LBS | DIASTOLIC BLOOD PRESSURE: 64 MMHG | BODY MASS INDEX: 29.52 KG/M2 | SYSTOLIC BLOOD PRESSURE: 120 MMHG

## 2018-12-31 DIAGNOSIS — O24.012 TYPE 1 DIABETES MELLITUS AFFECTING PREGNANCY IN SECOND TRIMESTER, ANTEPARTUM: ICD-10-CM

## 2018-12-31 DIAGNOSIS — O34.219 PREVIOUS CESAREAN SECTION COMPLICATING PREGNANCY, ANTEPARTUM CONDITION OR COMPLICATION: ICD-10-CM

## 2018-12-31 DIAGNOSIS — E10.65 TYPE 1 DIABETES MELLITUS WITH HYPERGLYCEMIA: ICD-10-CM

## 2018-12-31 DIAGNOSIS — Z3A.24 24 WEEKS GESTATION OF PREGNANCY: Primary | ICD-10-CM

## 2018-12-31 PROCEDURE — 99999 PR PBB SHADOW E&M-EST. PATIENT-LVL II: CPT | Mod: PBBFAC,,, | Performed by: OBSTETRICS & GYNECOLOGY

## 2018-12-31 PROCEDURE — 99212 OFFICE O/P EST SF 10 MIN: CPT | Mod: PBBFAC,TH | Performed by: OBSTETRICS & GYNECOLOGY

## 2018-12-31 PROCEDURE — 99213 OFFICE O/P EST LOW 20 MIN: CPT | Mod: TH,S$PBB,, | Performed by: OBSTETRICS & GYNECOLOGY

## 2018-12-31 RX ORDER — INSULIN LISPRO 100 U/ML
INJECTION, SOLUTION INTRAVENOUS; SUBCUTANEOUS
Refills: 0 | COMMUNITY
Start: 2018-12-24 | End: 2019-01-02

## 2018-12-31 RX ORDER — INSULIN LISPRO 100 [IU]/ML
INJECTION, SOLUTION INTRAVENOUS; SUBCUTANEOUS
Qty: 1 BOX | Refills: 11 | Status: SHIPPED | OUTPATIENT
Start: 2018-12-31 | End: 2019-01-02

## 2018-12-31 NOTE — PROGRESS NOTES
Patient has troubles getting insulin to be covered by her insurance.  Apparently, they do not cover her Humalog, the formulation she has been on for the past 10-12 years.  They changed her Lispro Kwikpen to the vial of Admelog.  Much more difficult to get the right dose in.    Fasting on new formulation 126-188.  2hr postprandial .    Needs CBC  Appointment with MFM in a couple of days.  Will prescribe Humalog

## 2018-12-31 NOTE — PROGRESS NOTES
OB here for routine exam. Pt having issue with medications getting filled due to insurance formulary list. jtn

## 2019-01-02 ENCOUNTER — HOSPITAL ENCOUNTER (OUTPATIENT)
Dept: PERINATAL CARE | Facility: HOSPITAL | Age: 24
Discharge: HOME | End: 2019-01-02
Attending: OBSTETRICS & GYNECOLOGY
Payer: MEDICAID

## 2019-01-02 ENCOUNTER — PATIENT MESSAGE (OUTPATIENT)
Dept: OBSTETRICS AND GYNECOLOGY | Facility: CLINIC | Age: 24
End: 2019-01-02

## 2019-01-02 ENCOUNTER — TELEPHONE (OUTPATIENT)
Dept: OBSTETRICS AND GYNECOLOGY | Facility: CLINIC | Age: 24
End: 2019-01-02

## 2019-01-02 DIAGNOSIS — O26.90 PREGNANCY, COMPLICATIONS OF: ICD-10-CM

## 2019-01-02 PROCEDURE — 76815 OB US LIMITED FETUS(S): CPT

## 2019-01-02 PROCEDURE — 76815 PR  US,PREGNANT UTERUS,LIMITED, 1/> FETUSES: ICD-10-PCS | Mod: 26,,, | Performed by: OBSTETRICS & GYNECOLOGY

## 2019-01-02 PROCEDURE — 99212 PR OFFICE/OUTPT VISIT, EST, LEVL II, 10-19 MIN: ICD-10-PCS | Mod: GT,25,TH, | Performed by: OBSTETRICS & GYNECOLOGY

## 2019-01-02 PROCEDURE — 99212 OFFICE O/P EST SF 10 MIN: CPT | Mod: GT,25,TH, | Performed by: OBSTETRICS & GYNECOLOGY

## 2019-01-02 PROCEDURE — 76815 OB US LIMITED FETUS(S): CPT | Mod: 26,,, | Performed by: OBSTETRICS & GYNECOLOGY

## 2019-01-02 RX ORDER — INSULIN LISPRO 100 [IU]/ML
INJECTION, SOLUTION INTRAVENOUS; SUBCUTANEOUS
Qty: 10 ML | Refills: 3 | Status: SHIPPED | OUTPATIENT
Start: 2019-01-02 | End: 2019-01-03 | Stop reason: SDUPTHER

## 2019-01-02 RX ORDER — INSULIN LISPRO 100 [IU]/ML
INJECTION, SOLUTION INTRAVENOUS; SUBCUTANEOUS
Qty: 10 ML | Refills: 3 | Status: SHIPPED | OUTPATIENT
Start: 2019-01-02 | End: 2019-01-02 | Stop reason: SDUPTHER

## 2019-01-02 RX ORDER — INSULIN LISPRO 100 [IU]/ML
10 INJECTION, SOLUTION INTRAVENOUS; SUBCUTANEOUS
Qty: 9 ML | Refills: 3 | Status: SHIPPED | OUTPATIENT
Start: 2019-01-02 | End: 2019-01-02

## 2019-01-02 RX ORDER — INSULIN LISPRO 100 [IU]/ML
INJECTION, SOLUTION INTRAVENOUS; SUBCUTANEOUS
Qty: 10 ML | Refills: 3 | Status: SHIPPED | OUTPATIENT
Start: 2019-01-02 | End: 2019-01-02

## 2019-01-02 NOTE — PROGRESS NOTES
Telemedicine [facility] Ultrasound Note   Consultation started: 2019 at 3:25 PM   The chief complaint leading to consultation is: Blood sugar check  The patient location is: [facility]  The patient arrived at: 315 pm  Spoke nurse at bedside with patient assisting consultant. Also present with the patient at the time of the consultation:no one    Patient reports that her insurance changed her coverage for insulin and she has been taking Basaglar 36 units in the am and 6 units at bedtime and has been taking Admelog 14 units with breakfast, 16 units with lunch and 18 units with dinner.  She prefers the humalog pen. She reported Dr. Case is working on her insulin refills and preauthorization.    She had 5 days worth of blood sugars. Nursing reported she was not taking short acting insulin. The patient reports that she has been taking Admelog and is going to Dr. Case's office after visit to discuss insulin rx.    Fastin-173 (5/5 elevated)  2 hour post breakfast:  (4/5 elevated)  2 hour post lunch:  (one low, 2/5 elevated)  2 hour post dinner:  (one low 3/5 elevated)  Reports no low 2 am    Discussed with patient changing basaglar to 36 units in the am and 10 units at bedtime  Recommend patient take Admelog 16 units at breakfast, 16 units at lunch and 18 units at dinner.  If Humalog is approved, she can take this instead of the Admelog.  I will hold off adjusting dinner insulin for now given other changes made.    Patient indicated she was going to Dr. Case's office. She was given precautions and will also check 2 am blood sugars.    I contacted Dr. Case after the patient's visit-he is working on her preauthorization for humalog but patient may need to take admelog until approved or if not approved. I advised him to contact our office if he needs any assistance. He indicated the patient is able to obtain teaching from the pharmacy at . Discussed with him that [facility] will also  provide education if needed as will diabetic education. He will notify us if any concerns. Patient has follow up growth ultrasound and BS check in 2 weeks with MFM. If any concerns prior to this, please contact our office.          Consultation ended: 1/2/2019 at 335 pm    Total time spent with patient: 10 minutes was spent in face to face time with greater than half of that time spent in counseling and coordination of care.  Consulting clinician was informed of the encounter and consult note.

## 2019-01-02 NOTE — TELEPHONE ENCOUNTER
Pt stopped by office and is willing to get a Rx for the insulin paid for under her insurance plan until a PA is requested for the kwikpen that is not covered. alia

## 2019-01-02 NOTE — PROGRESS NOTES
"Indication  ========    F/U Consultation. BS Check/FHTs.    History  ======    Previous Outcomes  Preg. no. 1  Outcome: Termination of pregnancy - surgical  Preg. no. 2  Outcome: Live birth  Gest. age 37 w + 1 d  Gender: male  Details: c/s   3  Para 1  Dahl children born living (T) 1  Dahl children born (T) 1  Abortions (A) 1  Dahl living children (L) 1  Terminations 1  Risk Factors  History risk factors: Diabetes mellitus  Details: Type I    Maternal Assessment  =================    BP syst 120 mmHg  BP diast 55 mmHg    Method  ======    Transabdominal ultrasound examination, 2D Color Doppler, Voluson E6. View: Sufficient.    Pregnancy  =========    Dahl pregnancy. Number of fetuses: 1.    Dating  ======    Cycle: regular cycle  GA by "stated dating" 25 w + 0 d  RIKA by "stated dating": 2019  Assigned: Dating performed on 2018, based on the external assessment  Assigned GA 25 w + 0 d  Assigned RIKA: 2019    General Evaluation  ==============    Cardiac activity: present.  bpm.  Fetal movements: visualized.  Presentation: breech.  Placenta: anterior, fundal.    Amniotic Fluid Assessment  =====================    Amount of AF: normal amount  MVP 7.0 cm    Consultation  ==========    Type: see epic note.    Impression  =========    Telemedicine:  Dahl live intrauterine pregnancy.    Recommendation  ==============    See EPIC note.  Insulin adjusted.  Dr. Case's office working on insulin preauthorization-advised him to contact our office if they need any assistance.    "

## 2019-01-02 NOTE — TELEPHONE ENCOUNTER
----- Message from Rudy Case MD sent at 1/2/2019  3:15 PM CST -----  Contact: Self  We knew that.  They would not cover the pen.  She might have to deal with the vial and syringes. Not sure if filling out PA would get the medication covered.    Rudy Case MD  ----- Message -----  From: Lamar Langston MA  Sent: 1/2/2019  10:33 AM  To: Rudy Case MD    Pt stated humalog is not covered  ----- Message -----  From: Frances Renteria  Sent: 1/2/2019  10:06 AM  To: Manpreet BILL Staff    Patient called to speak with Machelle. Please call at [phone]

## 2019-01-02 NOTE — TELEPHONE ENCOUNTER
----- Message from Frances Renteria sent at 1/2/2019 10:06 AM CST -----  Contact: Self  Patient called to speak with Machelle. Please call at [phone]    Patient stated her Humalog is not covered, message has been sent to Dr Case

## 2019-01-03 ENCOUNTER — TELEPHONE (OUTPATIENT)
Dept: OBSTETRICS AND GYNECOLOGY | Facility: CLINIC | Age: 24
End: 2019-01-03

## 2019-01-03 DIAGNOSIS — O26.92 COMPLICATION OF PREGNANCY IN SECOND TRIMESTER: ICD-10-CM

## 2019-01-03 DIAGNOSIS — O24.312 PRE-EXISTING DIABETES MELLITUS DURING PREGNANCY IN SECOND TRIMESTER: ICD-10-CM

## 2019-01-03 RX ORDER — INSULIN LISPRO 100 [IU]/ML
INJECTION, SOLUTION INTRAVENOUS; SUBCUTANEOUS
Qty: 10 ML | Refills: 3 | Status: SHIPPED | OUTPATIENT
Start: 2019-01-03 | End: 2019-01-15 | Stop reason: ALTCHOICE

## 2019-01-03 RX ORDER — INSULIN LISPRO 100 [IU]/ML
INJECTION, SOLUTION INTRAVENOUS; SUBCUTANEOUS
Qty: 10 ML | Refills: 3 | Status: SHIPPED | OUTPATIENT
Start: 2019-01-03 | End: 2019-01-03 | Stop reason: SDUPTHER

## 2019-01-03 NOTE — TELEPHONE ENCOUNTER
Patient is aware that she her insulin has been sent to her pharmacy with the dosage directions. Patient is also aware that we are working on a PA for her Humalog pin

## 2019-01-03 NOTE — TELEPHONE ENCOUNTER
Patient contacted the office to say that the humalog, lispro is not covered. Rx has been changed to admelog 10ml with same directions as the lispro insulin. Pharmacy stated that the generic is not covered either

## 2019-01-03 NOTE — TELEPHONE ENCOUNTER
Patient stated she is going to admit herself into the hospital due to not being able to get her insulin and she cannot eat anything at this time. Patient has been advised she may need to pay out of pocket for her insuline. Patient still stated she is going to have herself admitted into the hospital for help.

## 2019-01-03 NOTE — TELEPHONE ENCOUNTER
Patient is aware tnat her medication admelog is ready for  at her [facility] pharmecy. PA for 20 days supplies has been approved. Patient aware

## 2019-01-03 NOTE — PROGRESS NOTES
Insulin Lispro sent to [facility] [address].  16 units for breakfast  16 units for lunch  18 units for dinner

## 2019-01-03 NOTE — TELEPHONE ENCOUNTER
----- Message from Una Baldwin sent at 1/3/2019  8:10 AM CST -----  Contact: self  Pt calling to state that script of insulin lispro 100 unit/mL injection does not have directions. Please call pharmacy with directions. Pt would also like a call to [phone].

## 2019-01-15 ENCOUNTER — TELEPHONE (OUTPATIENT)
Dept: OBSTETRICS AND GYNECOLOGY | Facility: CLINIC | Age: 24
End: 2019-01-15

## 2019-01-15 DIAGNOSIS — O24.912 DIABETES MELLITUS AFFECTING PREGNANCY IN SECOND TRIMESTER: Primary | ICD-10-CM

## 2019-01-15 RX ORDER — INSULIN LISPRO 100 [IU]/ML
20 INJECTION, SOLUTION INTRAVENOUS; SUBCUTANEOUS
Qty: 18 ML | Refills: 11 | Status: SHIPPED | OUTPATIENT
Start: 2019-01-15 | End: 2019-01-16

## 2019-01-15 NOTE — TELEPHONE ENCOUNTER
Message forward to Dr. Case and pt was contacted to speak with Dr. Case regarding medication. jtn    ----- Message from Michaela Hahn sent at 1/15/2019 12:59 PM CST -----  Contact: Chintan with [facility] - [phone]  Refill request for-- Admelog.  He is asking for Dr. Case can change qty and dosage b/c pt is going through medication too fast. She would also like to change it to a pen instead of a seperate vial and needle.  Please change to Admelog Solostor that comes in the pen.

## 2019-01-15 NOTE — PROGRESS NOTES
Chintan with [facility].  Re.  Need to re-write prescription for Admelog.    Solostar comes in a pen.    Admelog 20 units with each meal.  To [facility] [address].    Rudy Case MD

## 2019-01-16 ENCOUNTER — HOSPITAL ENCOUNTER (OUTPATIENT)
Dept: PERINATAL CARE | Facility: HOSPITAL | Age: 24
Discharge: HOME | End: 2019-01-16
Attending: OBSTETRICS & GYNECOLOGY
Payer: MEDICAID

## 2019-01-16 DIAGNOSIS — O24.312 PRE-EXISTING DIABETES MELLITUS DURING PREGNANCY IN SECOND TRIMESTER: ICD-10-CM

## 2019-01-16 DIAGNOSIS — O24.912 DIABETES MELLITUS AFFECTING PREGNANCY IN SECOND TRIMESTER: Primary | ICD-10-CM

## 2019-01-16 DIAGNOSIS — O26.92 COMPLICATION OF PREGNANCY IN SECOND TRIMESTER: ICD-10-CM

## 2019-01-16 DIAGNOSIS — Z36.89 ENCOUNTER FOR ULTRASOUND TO ASSESS FETAL GROWTH: ICD-10-CM

## 2019-01-16 PROCEDURE — 76816 OB US FOLLOW-UP PER FETUS: CPT | Mod: 26,,, | Performed by: OBSTETRICS & GYNECOLOGY

## 2019-01-16 PROCEDURE — 99213 PR OFFICE/OUTPT VISIT, EST, LEVL III, 20-29 MIN: ICD-10-PCS | Mod: 25,TH,, | Performed by: OBSTETRICS & GYNECOLOGY

## 2019-01-16 PROCEDURE — 99213 OFFICE O/P EST LOW 20 MIN: CPT | Mod: 25,TH,, | Performed by: OBSTETRICS & GYNECOLOGY

## 2019-01-16 PROCEDURE — 76816 OB US FOLLOW-UP PER FETUS: CPT

## 2019-01-16 PROCEDURE — 76816 PR  US,PREGNANT UTERUS,F/U,TRANSABD APP: ICD-10-PCS | Mod: 26,,, | Performed by: OBSTETRICS & GYNECOLOGY

## 2019-01-16 RX ORDER — INSULIN LISPRO 100 [IU]/ML
INJECTION, SOLUTION INTRAVENOUS; SUBCUTANEOUS
Qty: 10 ML | Refills: 3 | Status: SHIPPED | OUTPATIENT
Start: 2019-01-16 | End: 2019-02-25 | Stop reason: SDUPTHER

## 2019-01-16 NOTE — ADDENDUM NOTE
Encounter addended by: Monique Garcia RN on: 1/16/2019 3:13 PM   Actions taken: Charge Capture section accepted

## 2019-01-16 NOTE — PROGRESS NOTES
"Indication  ========    F/U Consultation. Evaluation of fetal growth. BS Check.    History  ======    Previous Outcomes  Preg. no. 1  Outcome: Termination of pregnancy - surgical  Preg. no. 2  Outcome: Live birth  Gest. age 37 w + 1 d  Gender: male  Details: c/s   3  Para 1  Dahl children born living (T) 1  Dahl children born (T) 1  Abortions (A) 1  Dahl living children (L) 1  Terminations 1  Risk Factors  History risk factors: Diabetes mellitus  Details: Type I    Pregnancy History  ==============    Maternal Lab Tests  Test: Quad/ Penta Screen  Result: Negative  Wants to know gender: yes    Maternal Assessment  =================    Weight 78 kg  Weight (lb) 172 lb  BP syst 111 mmHg  BP diast 66 mmHg    Method  ======    2D Color Doppler, , Voluson E6, Transabdominal ultrasound examination. View: Sufficient.    Pregnancy  =========    Dahl pregnancy. Number of fetuses: 1.    Dating  ======    Cycle: regular cycle  GA by "stated dating" 27 w + 0 d  RIKA by "stated dating": 2019  Ultrasound examination on: 2019  GA by U/S based upon: AC, BPD, Femur, HC  GA by U/S 26 w + 5 d  RIKA by U/S: 2019  Assigned: Dating performed on 2018, based on the external assessment  Assigned GA 27 w + 0 d  Assigned RIKA: 2019    General Evaluation  ==============    Cardiac activity: present.  bpm.  Fetal movements: visualized.  Presentation: cephalic.  Placenta: anterior.  Amniotic fluid: MVP 6.8 cm.    Fetal Biometry  ============    Fetal Biometry  BPD 67.8 mm 27w 2d Hadlock  OFD 88.6 mm 28w 4d Zuhair  .6 mm 27w 3d Hadlock  .8 mm 25w 6d Hadlock  Femur 48.3 mm 26w 1d Hadlock   g 31% Alex  Calculated by: Hadlock (BPD-HC-AC-FL)  EFW (lb) 2 lb  EFW (oz) 0 oz  Cephalic index 0.77  HC / AC 1.18  FL / BPD 0.71  FL / AC 0.23  MVP 6.8 cm   bpm    Fetal Anatomy  ============    Cranium: normal  4-chamber view: documented " previously  Stomach: normal  Kidneys: normal  Bladder: normal  Wants to know gender: yes  Other: A full anatomy survey previously performed.    Consultation  ==========    Type: Follow up.  Patient here for blood sugar check.  Has had issues with insurance and insulin-Dr. Case has done preauthorization for her-also ordered an admelog pen-patient reports she has  spoken to insurance and pharmacy and there are issues with filling.  I spoke to pharmacy-they sent a preauthorization to Dr. Case for admelog. They will not cover vial due to too soon unless new rx with  instructions is sent. Patient had dropped vial at one point.  I spoke to Dr. Case as patient was going to his office and he will fill out preauthorization if that has not been done and will do a new rx for  admelog vial if needed.  Patient reports she will pay for rx if cannot get coverage as she will be running out of admelog.  She has no issues with Basaglar rx.    Patient has been eating less and certain things as she notes it controls her sugars better. She did not bring her log today.  Has been taking Basaglar 36 units in the am and 10 units at bedtime.  Admelog about 15 units with each meal. (previously had been 16/16/18)  Reports that her sugars are at times 120s fasting-she has no lows: will change Basaglar to 36 units in am and 12 units at bedtime.  She reports her postprandials are likely 170s to 180s-her admelog has varied-Dr. Case wrote for 20 units with each meal on rx yesterday.  Patient reports she will send her log in so that adjustments can be made.  Discussed checking 2 am blood sugars.  Discussed with Dr. Case-recommend hgba1c at next prenatal visit.    15 minutes was spent in face to face time with greater than half of that time spent in counseling and coordination of care.    Impression  =========    Dahl live intrauterine pregnancy.  Overall normal fetal growth.  Normal amniotic fluid volume.  Limited fetal anatomy appears  normal.    Recommendation  ==============    Follow up visit in 2 weeks for blood sugar check and FHT.  Follow up ultrasound in 4 weeks for growth and BS check.  Dr. Case working on preauthorization and insulin rx-spoke with him.  Recommend primary ob check hgba1c next visit.  Patient reports she will send log books in via portal.  See note above.

## 2019-01-18 ENCOUNTER — TELEPHONE (OUTPATIENT)
Dept: OBSTETRICS AND GYNECOLOGY | Facility: CLINIC | Age: 24
End: 2019-01-18

## 2019-01-18 NOTE — TELEPHONE ENCOUNTER
----- Message from Michaela randallkaylinhoney sent at 1/17/2019  3:01 PM CST -----  Contact: self - [phone]  Pt states pharmacy states prescription for insulin was written incorrectly. Pt states she really needs medication today. Please contact back at earliest convenience.

## 2019-01-28 ENCOUNTER — CLINICAL SUPPORT (OUTPATIENT)
Dept: OBSTETRICS AND GYNECOLOGY | Facility: CLINIC | Age: 24
End: 2019-01-28
Payer: MEDICAID

## 2019-01-28 ENCOUNTER — ROUTINE PRENATAL (OUTPATIENT)
Dept: OBSTETRICS AND GYNECOLOGY | Facility: CLINIC | Age: 24
End: 2019-01-28
Payer: MEDICAID

## 2019-01-28 VITALS
SYSTOLIC BLOOD PRESSURE: 120 MMHG | DIASTOLIC BLOOD PRESSURE: 66 MMHG | BODY MASS INDEX: 30.05 KG/M2 | WEIGHT: 175.06 LBS

## 2019-01-28 VITALS
BODY MASS INDEX: 30.05 KG/M2 | DIASTOLIC BLOOD PRESSURE: 66 MMHG | SYSTOLIC BLOOD PRESSURE: 120 MMHG | WEIGHT: 175.06 LBS

## 2019-01-28 DIAGNOSIS — Z3A.28 28 WEEKS GESTATION OF PREGNANCY: Primary | ICD-10-CM

## 2019-01-28 DIAGNOSIS — O34.219 PREVIOUS CESAREAN SECTION COMPLICATING PREGNANCY, ANTEPARTUM CONDITION OR COMPLICATION: ICD-10-CM

## 2019-01-28 DIAGNOSIS — Z23 NEED FOR TDAP VACCINATION: ICD-10-CM

## 2019-01-28 DIAGNOSIS — O24.313 PRE-EXISTING DIABETES MELLITUS DURING PREGNANCY IN THIRD TRIMESTER: ICD-10-CM

## 2019-01-28 DIAGNOSIS — Z23 NEED FOR TDAP VACCINATION: Primary | ICD-10-CM

## 2019-01-28 PROCEDURE — 99999 PR PBB SHADOW E&M-EST. PATIENT-LVL III: ICD-10-PCS | Mod: PBBFAC,,,

## 2019-01-28 PROCEDURE — 99999 PR PBB SHADOW E&M-EST. PATIENT-LVL III: CPT | Mod: PBBFAC,,,

## 2019-01-28 PROCEDURE — 99212 OFFICE O/P EST SF 10 MIN: CPT | Mod: PBBFAC,27,TH,25 | Performed by: OBSTETRICS & GYNECOLOGY

## 2019-01-28 PROCEDURE — 99213 OFFICE O/P EST LOW 20 MIN: CPT | Mod: TH,S$PBB,, | Performed by: OBSTETRICS & GYNECOLOGY

## 2019-01-28 PROCEDURE — 99999 PR PBB SHADOW E&M-EST. PATIENT-LVL II: CPT | Mod: PBBFAC,,, | Performed by: OBSTETRICS & GYNECOLOGY

## 2019-01-28 PROCEDURE — 99999 PR PBB SHADOW E&M-EST. PATIENT-LVL II: ICD-10-PCS | Mod: PBBFAC,,, | Performed by: OBSTETRICS & GYNECOLOGY

## 2019-01-28 PROCEDURE — 99213 PR OFFICE/OUTPT VISIT, EST, LEVL III, 20-29 MIN: ICD-10-PCS | Mod: TH,S$PBB,, | Performed by: OBSTETRICS & GYNECOLOGY

## 2019-01-28 PROCEDURE — 99213 OFFICE O/P EST LOW 20 MIN: CPT | Mod: PBBFAC,25

## 2019-01-28 PROCEDURE — 90471 IMMUNIZATION ADMIN: CPT | Mod: PBBFAC

## 2019-01-28 NOTE — PROGRESS NOTES
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 Pt stated her understanding of all instructions. Instructed pt to wait in the waiting room for 10-15 mins in case of an immediate adverse reaction

## 2019-01-28 NOTE — PROGRESS NOTES
Still with high AccuCheck.  Has had much difficulty getting insulin covered.    Fasting 106-188 with 2hr postprandial     Discussed and given Tdap    Discussed NST and MFM evaluation.  Discussed possible delivery at 37-38 weeks  Back in 2 weeks.

## 2019-01-30 DIAGNOSIS — O26.90 PREGNANCY, COMPLICATIONS OF: Primary | ICD-10-CM

## 2019-02-04 ENCOUNTER — HOSPITAL ENCOUNTER (OUTPATIENT)
Dept: PERINATAL CARE | Facility: HOSPITAL | Age: 24
Discharge: HOME | End: 2019-02-04
Attending: OBSTETRICS & GYNECOLOGY
Payer: MEDICAID

## 2019-02-04 VITALS
DIASTOLIC BLOOD PRESSURE: 70 MMHG | SYSTOLIC BLOOD PRESSURE: 128 MMHG | WEIGHT: 176.38 LBS | BODY MASS INDEX: 30.28 KG/M2

## 2019-02-04 DIAGNOSIS — O26.90 PREGNANCY, COMPLICATIONS OF: ICD-10-CM

## 2019-02-04 DIAGNOSIS — O24.313 PRE-EXISTING DIABETES MELLITUS DURING PREGNANCY IN THIRD TRIMESTER: ICD-10-CM

## 2019-02-04 PROCEDURE — 76816 OB US FOLLOW-UP PER FETUS: CPT

## 2019-02-04 PROCEDURE — 76815 OB US LIMITED FETUS(S): CPT | Mod: 26,,, | Performed by: OBSTETRICS & GYNECOLOGY

## 2019-02-04 PROCEDURE — 99213 PR OFFICE/OUTPT VISIT, EST, LEVL III, 20-29 MIN: ICD-10-PCS | Mod: 25,TH,, | Performed by: OBSTETRICS & GYNECOLOGY

## 2019-02-04 PROCEDURE — 76815 PR  US,PREGNANT UTERUS,LIMITED, 1/> FETUSES: ICD-10-PCS | Mod: 26,,, | Performed by: OBSTETRICS & GYNECOLOGY

## 2019-02-04 PROCEDURE — 99213 OFFICE O/P EST LOW 20 MIN: CPT | Mod: 25,TH,, | Performed by: OBSTETRICS & GYNECOLOGY

## 2019-02-04 NOTE — PROGRESS NOTES
"Indication  ========    F/U Consultation. FHTs. BS Check.    History  ======    Previous Outcomes  Preg. no. 1  Outcome: Termination of pregnancy - surgical  Preg. no. 2  Outcome: Live birth  Gest. age 37 w + 1 d  Gender: male  Details: c/s   3  Para 1  Adhl children born living (T) 1  Dahl children born (T) 1  Abortions (A) 1  Dahl living children (L) 1  Terminations 1  Risk Factors  History risk factors: Diabetes mellitus  Details: Type I    Maternal Assessment  =================    Weight 80 kg  Weight (lb) 176 lb  BP syst 128 mmHg  BP diast 70 mmHg    Method  ======    Transabdominal ultrasound examination, 2D Color Doppler, Voluson E6. View: Sufficient.    Pregnancy  =========    Dahl pregnancy. Number of fetuses: 1.    Dating  ======    Cycle: regular cycle  GA by "stated dating" 29 w + 5 d  RIKA by "stated dating": 2019  Assigned: Dating performed on 2018, based on the external assessment  Assigned GA 29 w + 5 d  Assigned RIKA: 2019    General Evaluation  ==============    Cardiac activity: present.  bpm.  Fetal movements: visualized.  Presentation: cephalic.  Placenta: anterior.    Amniotic Fluid Assessment  =====================    Amount of AF: normal amount  MVP 6.5 cm    Consultation  ==========    Type: BS check.  Patient doing well. Feels things are going much better.  Patient taking Basaglar 36 units in am and 12 at bedtime.  Taking Admelog 18-20 units with meals depending on what she eats. It is more difficult for her to adjust admelog due to her work schedule.  Patient is eating largest meal at breakfast    Fastin, 127, 108, 115, 172, 101, 92  2 hour post breakfast: 142, 107, 137, 187, 126, 137, 158  2 hour post lunch: 70, 182, 175, 100, 97, 154, 108  2 hour post dinner: 118, 170, 108, 111, 192, 110, 166    Adjust insulin to:  Basaglar 40 units in am and 14 units at bedtime.  Continue admelog 18 to 20 units with meals    Patient to check 2 am " blood sugars for at least a few days to be sure no hypoglycemia.  Recommend primary ob check hgba1c at next prenatal visit.  Has MFM follow up on 2/13.    Recommend primary ob confirm that patient schedules maternal cardiac exam.    15 minutes was spent in face to face time with greater than half of that time spent in counseling and coordination of care.    Impression  =========    Dahl live intrauterine pregnancy.  Limited ultrasound for FHT.  Normal amniotic fluid volume.    Recommendation  ==============    Follow up ultrasound on 2/13 for growth and fluid and BS check.  Insulin adjusted as above.  Recommend primary ob check hgba1c at next visit.  Recommend primary ob confirms that patient schedule maternal cardiac exams.

## 2019-02-08 ENCOUNTER — TELEPHONE (OUTPATIENT)
Dept: OBSTETRICS AND GYNECOLOGY | Facility: CLINIC | Age: 24
End: 2019-02-08

## 2019-02-08 NOTE — TELEPHONE ENCOUNTER
----- Message from Zane Spear sent at 2/8/2019 12:13 PM CST -----  Contact: Jay [phone]  The patient is requesting a call back from the staff. She reports that it is an emergency. Please call at your earliest convenience.    Spoke with patient and she stated she will not be in on her Monday appt. She also asked for [facility] number due to elevated blood sugars. Patient has been given Dr Giang number per patient request

## 2019-02-09 PROCEDURE — 99283 EMERGENCY DEPT VISIT LOW MDM: CPT

## 2019-02-10 ENCOUNTER — HOSPITAL ENCOUNTER (EMERGENCY)
Facility: HOSPITAL | Age: 24
Discharge: HOME | End: 2019-02-10
Attending: EMERGENCY MEDICINE
Payer: MEDICAID

## 2019-02-10 VITALS
SYSTOLIC BLOOD PRESSURE: 130 MMHG | BODY MASS INDEX: 33.77 KG/M2 | WEIGHT: 172 LBS | OXYGEN SATURATION: 98 % | DIASTOLIC BLOOD PRESSURE: 62 MMHG | HEART RATE: 87 BPM | HEIGHT: 60 IN | TEMPERATURE: 99 F | RESPIRATION RATE: 18 BRPM

## 2019-02-10 DIAGNOSIS — K08.89 PAIN, DENTAL: Primary | ICD-10-CM

## 2019-02-10 RX ORDER — PENICILLIN V POTASSIUM 250 MG/1
250 TABLET, FILM COATED ORAL
Status: DISCONTINUED | OUTPATIENT
Start: 2019-02-10 | End: 2019-02-10 | Stop reason: HOSPADM

## 2019-02-10 RX ORDER — PENICILLIN V POTASSIUM 250 MG/1
250 TABLET, FILM COATED ORAL EVERY 6 HOURS
Qty: 28 TABLET | Refills: 0 | Status: SHIPPED | OUTPATIENT
Start: 2019-02-10 | End: 2019-02-17

## 2019-02-10 NOTE — DISCHARGE INSTRUCTIONS
NO pain meds other than tylenol.  Return to the Emergency department for any worsening or failure to improve, otherwise follow up with your primary care provider.

## 2019-02-10 NOTE — ED TRIAGE NOTES
"Patient arrived to ED with c/o right lower jaw toothache that radiates that right ear x 1 day.  Reports that she has a "hole" in her tooth x 3 months.  Denies fever, swelling, or sore throat.  No acute distress noted.  "

## 2019-02-10 NOTE — ED PROVIDER NOTES
Encounter Date: 2019       History     Chief Complaint   Patient presents with    Dental Pain     c/o cracked tooth to right bottom side. started last night     Chief complaint:  Dental pain    History of present illness:  Patient is a 23-year-old female reports 1 day of right lower dental pain secondary to a cracked tooth.  She reports the pain is constant throbbing and aching.  Tylenol has been ineffective for relief of pain. Current severity pain is 7/10.  She denies facial swelling, fever, chills and all other complaints. Pt is currently in her third trimester of pregnancy.      The history is provided by the patient and medical records. No  was used.     Review of patient's allergies indicates:   Allergen Reactions    Clindamycin Anaphylaxis and Hives     Past Medical History:   Diagnosis Date    Diabetes mellitus type I     Heart murmur     Sickle cell trait      Past Surgical History:   Procedure Laterality Date     SECTION      cyst removed on buttox  2011    DELIVERY-CEASAREAN SECTION N/A 10/16/2014    [facility] L[facility]D OR    DILATION AND CURETTAGE OF UTERUS  13    I&D VULVA ABCESS Left 2014    [facility] at [facility] OR    PILONIDAL CYST DRAINAGE       Family History   Problem Relation Age of Onset    Diabetes Paternal Grandmother     Hypertension Paternal Grandmother     Asthma Brother     Thyroid disease Mother     Congenital heart disease Son         hole in heart    Cardiomyopathy Neg Hx     Arrhythmia Neg Hx     Heart attacks under age 50 Neg Hx     Pacemaker/defibrilator Neg Hx      Social History     Tobacco Use    Smoking status: Never Smoker    Smokeless tobacco: Never Used   Substance Use Topics    Alcohol use: No    Drug use: No     Review of Systems   Constitutional: Negative for chills, fatigue and fever.   HENT: Positive for dental problem. Negative for congestion, ear discharge, ear  pain, postnasal drip, rhinorrhea, sinus pressure, sneezing, sore throat and voice change.    Eyes: Negative for discharge and itching.   Respiratory: Negative for cough, shortness of breath and wheezing.    Cardiovascular: Negative for chest pain, palpitations and leg swelling.   Gastrointestinal: Negative for abdominal pain, constipation, diarrhea, nausea and vomiting.   Endocrine: Negative for polydipsia, polyphagia and polyuria.   Genitourinary: Negative for dysuria, frequency, hematuria, urgency, vaginal bleeding, vaginal discharge and vaginal pain.   Musculoskeletal: Negative for arthralgias and myalgias.   Skin: Negative for rash and wound.   Neurological: Negative for dizziness, seizures, syncope, weakness and numbness.   Hematological: Negative for adenopathy. Does not bruise/bleed easily.   Psychiatric/Behavioral: Negative for self-injury and suicidal ideas. The patient is not nervous/anxious.        Physical Exam     Initial Vitals [02/09/19 2333]   BP Pulse Resp Temp SpO2   130/62 87 18 98.9 °F (37.2 °C) 98 %      MAP       --         Physical Exam    Nursing note and vitals reviewed.  Constitutional: She appears well-developed and well-nourished.   HENT:   Head: Normocephalic and atraumatic.   Right Ear: External ear normal.   Left Ear: External ear normal.   Nose: Nose normal. No epistaxis.   Mouth/Throat: Oropharynx is clear and moist and mucous membranes are normal. She does not have dentures. No oral lesions. No trismus in the jaw. Abnormal dentition. Dental caries present. No dental abscesses, uvula swelling or lacerations.   Eyes: Conjunctivae and EOM are normal. Pupils are equal, round, and reactive to light. Right eye exhibits no discharge. Left eye exhibits no discharge.   Neck: Normal range of motion.   Abdominal: She exhibits no distension.   Musculoskeletal: Normal range of motion.   Neurological: She is alert and oriented to person, place, and time.   Skin: Skin is dry. Capillary refill  takes less than 2 seconds.         ED Course   Procedures  Labs Reviewed - No data to display       Imaging Results    None                APC / Resident Notes:   This is an evaluation of a 23 y.o. female that presents to the Emergency Department for dental pain. The patient reports no fever, chills, nausea, vomiting, or inability to open mouth. Physical Exam shows a non-toxic, afebrile, and well appearing female with overall poor dentition with multiple dental carries\cavities noted. There is no facial swelling. There is no visible oral drainable abscess at this time. She has no facial cellulitis, oral swelling, airway compromise, sublingual swelling, or trismus. There is no gingival erythema on the facial and lingual surfaces surrounding the tooth.The neck is soft and supple.     Vital signs are reassuring.     My overall impression is dental pain. I considered, but at this time, do not suspect Mark's angina, deep space infection, peritonsillar infection, pharyngitis, mastoiditis, or a facial cellulitis.    ED Course: Pt was offered local anaesthesia but declined. Pen v k 250mg o D/C Meds: Pen vk 250mg po q6h. The diagnosis, treatment plan, instructions for follow-up and reevaluation with a dentist as well as ED return precautions were discussed and understanding was verbalized. All questions or concerns have been addressed.                 ED Course as of Feb 10 0414   Sun Feb 10, 2019   0016 BP: 130/62 [VC]   0017 Temp: 98.9 °F (37.2 °C) [VC]   0017 Temp src: Oral [VC]   0017 Pulse: 87 [VC]   0017 Resp: 18 [VC]   0017 SpO2: 98 % [VC]      ED Course User Index  [VC] Kalia Carter DNP     Clinical Impression:   The encounter diagnosis was Pain, dental.      Disposition:   Disposition: Discharged  Condition: Stable                        Kalia Carter DNP  02/10/19 0417

## 2019-02-12 ENCOUNTER — ROUTINE PRENATAL (OUTPATIENT)
Dept: OBSTETRICS AND GYNECOLOGY | Facility: CLINIC | Age: 24
End: 2019-02-12
Payer: MEDICAID

## 2019-02-12 VITALS
DIASTOLIC BLOOD PRESSURE: 76 MMHG | WEIGHT: 178.56 LBS | BODY MASS INDEX: 34.88 KG/M2 | SYSTOLIC BLOOD PRESSURE: 120 MMHG

## 2019-02-12 DIAGNOSIS — Z3A.30 30 WEEKS GESTATION OF PREGNANCY: Primary | ICD-10-CM

## 2019-02-12 DIAGNOSIS — O24.313 PRE-EXISTING DIABETES MELLITUS DURING PREGNANCY IN THIRD TRIMESTER: ICD-10-CM

## 2019-02-12 DIAGNOSIS — O34.219 PREVIOUS CESAREAN SECTION COMPLICATING PREGNANCY, ANTEPARTUM CONDITION OR COMPLICATION: ICD-10-CM

## 2019-02-12 PROCEDURE — 99999 PR PBB SHADOW E&M-EST. PATIENT-LVL III: ICD-10-PCS | Mod: PBBFAC,,, | Performed by: OBSTETRICS & GYNECOLOGY

## 2019-02-12 PROCEDURE — 99213 PR OFFICE/OUTPT VISIT, EST, LEVL III, 20-29 MIN: ICD-10-PCS | Mod: TH,S$PBB,, | Performed by: OBSTETRICS & GYNECOLOGY

## 2019-02-12 PROCEDURE — 99213 OFFICE O/P EST LOW 20 MIN: CPT | Mod: PBBFAC,TH | Performed by: OBSTETRICS & GYNECOLOGY

## 2019-02-12 PROCEDURE — 99213 OFFICE O/P EST LOW 20 MIN: CPT | Mod: TH,S$PBB,, | Performed by: OBSTETRICS & GYNECOLOGY

## 2019-02-12 PROCEDURE — 99999 PR PBB SHADOW E&M-EST. PATIENT-LVL III: CPT | Mod: PBBFAC,,, | Performed by: OBSTETRICS & GYNECOLOGY

## 2019-02-12 NOTE — PROGRESS NOTES
No new complaint  Does not have glucolog for review.    Will start NST next week at 32 weeks  RCS/BTL on 4/3/2019 at 0930  Hemoglobin A1c  Cardiology evaluation    Back next week

## 2019-02-13 ENCOUNTER — HOSPITAL ENCOUNTER (OUTPATIENT)
Dept: PERINATAL CARE | Facility: HOSPITAL | Age: 24
Discharge: HOME | End: 2019-02-13
Attending: OBSTETRICS & GYNECOLOGY
Payer: MEDICAID

## 2019-02-13 VITALS — SYSTOLIC BLOOD PRESSURE: 113 MMHG | BODY MASS INDEX: 34.76 KG/M2 | DIASTOLIC BLOOD PRESSURE: 73 MMHG | WEIGHT: 178 LBS

## 2019-02-13 DIAGNOSIS — O24.113 PRE-EXISTING TYPE 2 DIABETES MELLITUS DURING PREGNANCY IN THIRD TRIMESTER: Primary | ICD-10-CM

## 2019-02-13 DIAGNOSIS — O26.90 PREGNANCY, COMPLICATIONS OF: ICD-10-CM

## 2019-02-13 PROCEDURE — 99213 PR OFFICE/OUTPT VISIT, EST, LEVL III, 20-29 MIN: ICD-10-PCS | Mod: 25,TH,, | Performed by: OBSTETRICS & GYNECOLOGY

## 2019-02-13 PROCEDURE — 76816 OB US FOLLOW-UP PER FETUS: CPT

## 2019-02-13 PROCEDURE — 76816 PR  US,PREGNANT UTERUS,F/U,TRANSABD APP: ICD-10-PCS | Mod: 26,,, | Performed by: OBSTETRICS & GYNECOLOGY

## 2019-02-13 PROCEDURE — 99213 OFFICE O/P EST LOW 20 MIN: CPT | Mod: 25,TH,, | Performed by: OBSTETRICS & GYNECOLOGY

## 2019-02-13 PROCEDURE — 76816 OB US FOLLOW-UP PER FETUS: CPT | Mod: 26,,, | Performed by: OBSTETRICS & GYNECOLOGY

## 2019-02-22 ENCOUNTER — DOCUMENTATION ONLY (OUTPATIENT)
Dept: MATERNAL FETAL MEDICINE | Facility: CLINIC | Age: 24
End: 2019-02-22

## 2019-02-25 ENCOUNTER — ROUTINE PRENATAL (OUTPATIENT)
Dept: OBSTETRICS AND GYNECOLOGY | Facility: CLINIC | Age: 24
End: 2019-02-25
Payer: MEDICAID

## 2019-02-25 VITALS — DIASTOLIC BLOOD PRESSURE: 64 MMHG | BODY MASS INDEX: 35.35 KG/M2 | WEIGHT: 181 LBS | SYSTOLIC BLOOD PRESSURE: 130 MMHG

## 2019-02-25 DIAGNOSIS — O24.313 PRE-EXISTING DIABETES MELLITUS DURING PREGNANCY IN THIRD TRIMESTER: ICD-10-CM

## 2019-02-25 DIAGNOSIS — O34.219 PREVIOUS CESAREAN SECTION COMPLICATING PREGNANCY, ANTEPARTUM CONDITION OR COMPLICATION: ICD-10-CM

## 2019-02-25 DIAGNOSIS — Z3A.32 32 WEEKS GESTATION OF PREGNANCY: Primary | ICD-10-CM

## 2019-02-25 DIAGNOSIS — O26.92 COMPLICATION OF PREGNANCY IN SECOND TRIMESTER: ICD-10-CM

## 2019-02-25 DIAGNOSIS — O24.912 DIABETES MELLITUS AFFECTING PREGNANCY IN SECOND TRIMESTER: ICD-10-CM

## 2019-02-25 PROCEDURE — 99212 OFFICE O/P EST SF 10 MIN: CPT | Mod: PBBFAC,TH,25 | Performed by: OBSTETRICS & GYNECOLOGY

## 2019-02-25 PROCEDURE — 59025 FETAL NON-STRESS TEST: CPT | Mod: 26,S$PBB,, | Performed by: OBSTETRICS & GYNECOLOGY

## 2019-02-25 PROCEDURE — 99213 OFFICE O/P EST LOW 20 MIN: CPT | Mod: TH,S$PBB,25, | Performed by: OBSTETRICS & GYNECOLOGY

## 2019-02-25 PROCEDURE — 99213 PR OFFICE/OUTPT VISIT, EST, LEVL III, 20-29 MIN: ICD-10-PCS | Mod: TH,S$PBB,25, | Performed by: OBSTETRICS & GYNECOLOGY

## 2019-02-25 PROCEDURE — 59025 PR FETAL 2N-STRESS TEST: ICD-10-PCS | Mod: 26,S$PBB,, | Performed by: OBSTETRICS & GYNECOLOGY

## 2019-02-25 PROCEDURE — 59025 FETAL NON-STRESS TEST: CPT | Mod: PBBFAC | Performed by: OBSTETRICS & GYNECOLOGY

## 2019-02-25 PROCEDURE — 99999 PR PBB SHADOW E&M-EST. PATIENT-LVL II: ICD-10-PCS | Mod: PBBFAC,,, | Performed by: OBSTETRICS & GYNECOLOGY

## 2019-02-25 PROCEDURE — 99999 PR PBB SHADOW E&M-EST. PATIENT-LVL II: CPT | Mod: PBBFAC,,, | Performed by: OBSTETRICS & GYNECOLOGY

## 2019-02-25 RX ORDER — SYRINGE-NEEDLE,INSULIN,0.5 ML 31 GX5/16"
1 SYRINGE, EMPTY DISPOSABLE MISCELLANEOUS 4 TIMES DAILY
Qty: 120 EACH | Refills: 1 | Status: ON HOLD | COMMUNITY
Start: 2019-02-25 | End: 2019-03-22 | Stop reason: HOSPADM

## 2019-02-25 RX ORDER — INSULIN GLARGINE 100 [IU]/ML
40 INJECTION, SOLUTION SUBCUTANEOUS DAILY
Qty: 4 BOX | Refills: 3 | Status: ON HOLD | OUTPATIENT
Start: 2019-02-25 | End: 2019-03-22 | Stop reason: HOSPADM

## 2019-02-25 RX ORDER — INSULIN LISPRO 100 [IU]/ML
INJECTION, SOLUTION INTRAVENOUS; SUBCUTANEOUS
Qty: 10 ML | Refills: 3 | Status: SHIPPED | OUTPATIENT
Start: 2019-02-25 | End: 2020-02-25

## 2019-02-25 NOTE — PROGRESS NOTES
No new complaint  Has been doing better with her glucose and insulin.    NST NOTES    Patient presents for her office visit and NST.    Indication:    Pre-pregnancy diabetes mellitus.    Interpretation:    Fetal heart tones show a baseline of 130-140 with at least 15 bpm x 15 seconds accelerations and moderate  variability.  This is reactive.  No decelerations noted.     Time:    Patient was monitored for 37 minutes for this visit    Rudy Case MD    Still has not seen by Cardiology.  Needs to see Podiatry  Appointment with Ophthalm 3/14/2019    Back in 3 days for NST    Now on Basilar 40 units in AM and 16 at bedtime

## 2019-03-04 ENCOUNTER — ROUTINE PRENATAL (OUTPATIENT)
Dept: OBSTETRICS AND GYNECOLOGY | Facility: CLINIC | Age: 24
End: 2019-03-04
Payer: MEDICAID

## 2019-03-04 VITALS
SYSTOLIC BLOOD PRESSURE: 118 MMHG | BODY MASS INDEX: 36.53 KG/M2 | WEIGHT: 187.06 LBS | DIASTOLIC BLOOD PRESSURE: 67 MMHG

## 2019-03-04 DIAGNOSIS — Z3A.33 33 WEEKS GESTATION OF PREGNANCY: Primary | ICD-10-CM

## 2019-03-04 DIAGNOSIS — O24.313 PRE-EXISTING DIABETES MELLITUS DURING PREGNANCY IN THIRD TRIMESTER: ICD-10-CM

## 2019-03-04 PROCEDURE — 99999 PR PBB SHADOW E&M-EST. PATIENT-LVL II: CPT | Mod: PBBFAC,,, | Performed by: OBSTETRICS & GYNECOLOGY

## 2019-03-04 PROCEDURE — 99212 OFFICE O/P EST SF 10 MIN: CPT | Mod: PBBFAC,TH,25 | Performed by: OBSTETRICS & GYNECOLOGY

## 2019-03-04 PROCEDURE — 59025 PR FETAL 2N-STRESS TEST: ICD-10-PCS | Mod: 26,S$PBB,, | Performed by: OBSTETRICS & GYNECOLOGY

## 2019-03-04 PROCEDURE — 59025 FETAL NON-STRESS TEST: CPT | Mod: PBBFAC | Performed by: OBSTETRICS & GYNECOLOGY

## 2019-03-04 PROCEDURE — 99999 PR PBB SHADOW E&M-EST. PATIENT-LVL II: ICD-10-PCS | Mod: PBBFAC,,, | Performed by: OBSTETRICS & GYNECOLOGY

## 2019-03-04 PROCEDURE — 99213 OFFICE O/P EST LOW 20 MIN: CPT | Mod: TH,S$PBB,25, | Performed by: OBSTETRICS & GYNECOLOGY

## 2019-03-04 PROCEDURE — 99213 PR OFFICE/OUTPT VISIT, EST, LEVL III, 20-29 MIN: ICD-10-PCS | Mod: TH,S$PBB,25, | Performed by: OBSTETRICS & GYNECOLOGY

## 2019-03-04 PROCEDURE — 59025 FETAL NON-STRESS TEST: CPT | Mod: 26,S$PBB,, | Performed by: OBSTETRICS & GYNECOLOGY

## 2019-03-04 NOTE — PROGRESS NOTES
33w5d here for OB visit and NST.    Pt of Dr. Case.  Pregnancy complicated by pre-existing DM on insulin.    Pt has no major complaints today.  Reports active fetus.  Denies vaginal bleeding, leakage of fluid, or contractions.    Pt did not bring glucose log today.  Per pt recall, states fasting mostly <100 and 2 hrs < 140.  Pt states she is currently on Basaglar 40 units in AM and 16 at bedtime, and Admelog 20 units with each meal.  Unable to titrate insulin therapy due to limited information on glycemic control.  Importance of home glucose monitoring discussed.  Pt was instructed to bring glucose log to every visit.  We discussed the effects uncontrolled DM on her pregnancy.  ADA diet reviewed.  Encourage healthy lifestyle modifications.  Call office or go L&D if fasting > 120, 2hr > 160.     NST today reassuring.  _____________________________________________________________________    NST NOTE      Indication:  Pre-exising DM on insulin  Interpretation:  Baseline 140, moderate variability, positive acceleration, no decelerations. [place]: No contraction.  Time:  Monitored for ~20 minutes  MVP:  5.66 cm.  Continue twice weekly NST until delivery.  _____________________________________________________________________    Labor/preE/DM precautions.  Kick counts.  Return qM/R for NST with Dr. Case, or sooner prn.  Voiced understanding.    Reese Faith MD

## 2019-03-11 ENCOUNTER — ROUTINE PRENATAL (OUTPATIENT)
Dept: OBSTETRICS AND GYNECOLOGY | Facility: CLINIC | Age: 24
End: 2019-03-11
Payer: MEDICAID

## 2019-03-11 VITALS
BODY MASS INDEX: 36.68 KG/M2 | DIASTOLIC BLOOD PRESSURE: 72 MMHG | SYSTOLIC BLOOD PRESSURE: 132 MMHG | WEIGHT: 187.81 LBS

## 2019-03-11 DIAGNOSIS — O34.219 PREVIOUS CESAREAN SECTION COMPLICATING PREGNANCY, ANTEPARTUM CONDITION OR COMPLICATION: ICD-10-CM

## 2019-03-11 DIAGNOSIS — Z3A.34 34 WEEKS GESTATION OF PREGNANCY: Primary | ICD-10-CM

## 2019-03-11 DIAGNOSIS — O24.313 PRE-EXISTING DIABETES MELLITUS DURING PREGNANCY IN THIRD TRIMESTER: ICD-10-CM

## 2019-03-11 PROCEDURE — 59025 FETAL NON-STRESS TEST: CPT | Mod: 26,S$PBB,, | Performed by: OBSTETRICS & GYNECOLOGY

## 2019-03-11 PROCEDURE — 99213 PR OFFICE/OUTPT VISIT, EST, LEVL III, 20-29 MIN: ICD-10-PCS | Mod: TH,S$PBB,25, | Performed by: OBSTETRICS & GYNECOLOGY

## 2019-03-11 PROCEDURE — 99213 OFFICE O/P EST LOW 20 MIN: CPT | Mod: TH,S$PBB,25, | Performed by: OBSTETRICS & GYNECOLOGY

## 2019-03-11 PROCEDURE — 59025 PR FETAL 2N-STRESS TEST: ICD-10-PCS | Mod: 26,S$PBB,, | Performed by: OBSTETRICS & GYNECOLOGY

## 2019-03-11 PROCEDURE — 99999 PR PBB SHADOW E&M-EST. PATIENT-LVL II: CPT | Mod: PBBFAC,,, | Performed by: OBSTETRICS & GYNECOLOGY

## 2019-03-11 PROCEDURE — 99999 PR PBB SHADOW E&M-EST. PATIENT-LVL II: ICD-10-PCS | Mod: PBBFAC,,, | Performed by: OBSTETRICS & GYNECOLOGY

## 2019-03-11 PROCEDURE — 59025 FETAL NON-STRESS TEST: CPT | Mod: PBBFAC | Performed by: OBSTETRICS & GYNECOLOGY

## 2019-03-11 PROCEDURE — 99212 OFFICE O/P EST SF 10 MIN: CPT | Mod: PBBFAC,TH | Performed by: OBSTETRICS & GYNECOLOGY

## 2019-03-11 NOTE — PROGRESS NOTES
34w5d  No complaint  Trying hard to control her glucose    Fasting  with 2hr postprandial     NST NOTES    Patient presents for her office visit and NST.    Indication:    Pre-pregnancy diabetes mellitus.    Interpretation:    Fetal heart tones show a baseline of 150 with at least 15 bpm x 15 seconds accelerations and moderate variability.  This is reactive.  No decelerations noted.     Time:    Patient was monitored for 33 minutes for this visit    Rudy Case MD    Back in 3 days for NST

## 2019-03-13 ENCOUNTER — HOSPITAL ENCOUNTER (OUTPATIENT)
Dept: PERINATAL CARE | Facility: HOSPITAL | Age: 24
Discharge: HOME | End: 2019-03-13
Attending: OBSTETRICS & GYNECOLOGY
Payer: MEDICAID

## 2019-03-13 DIAGNOSIS — O26.90 PREGNANCY, COMPLICATIONS OF: ICD-10-CM

## 2019-03-13 PROCEDURE — 76819 FETAL BIOPHYS PROFIL W/O NST: CPT | Mod: 26,,, | Performed by: OBSTETRICS & GYNECOLOGY

## 2019-03-13 PROCEDURE — 76819 FETAL BIOPHYS PROFIL W/O NST: CPT

## 2019-03-13 PROCEDURE — 76819 PR US, OB, FETAL BIOPHYSICAL, W/O NST: ICD-10-PCS | Mod: 26,,, | Performed by: OBSTETRICS & GYNECOLOGY

## 2019-03-13 PROCEDURE — 99213 OFFICE O/P EST LOW 20 MIN: CPT | Mod: 25,TH,, | Performed by: OBSTETRICS & GYNECOLOGY

## 2019-03-13 PROCEDURE — 99213 PR OFFICE/OUTPT VISIT, EST, LEVL III, 20-29 MIN: ICD-10-PCS | Mod: 25,TH,, | Performed by: OBSTETRICS & GYNECOLOGY

## 2019-03-13 PROCEDURE — 76816 OB US FOLLOW-UP PER FETUS: CPT | Mod: 26,,, | Performed by: OBSTETRICS & GYNECOLOGY

## 2019-03-13 PROCEDURE — 76816 PR  US,PREGNANT UTERUS,F/U,TRANSABD APP: ICD-10-PCS | Mod: 26,,, | Performed by: OBSTETRICS & GYNECOLOGY

## 2019-03-13 PROCEDURE — 76816 OB US FOLLOW-UP PER FETUS: CPT

## 2019-03-14 ENCOUNTER — TELEPHONE (OUTPATIENT)
Dept: OBSTETRICS AND GYNECOLOGY | Facility: CLINIC | Age: 24
End: 2019-03-14

## 2019-03-14 ENCOUNTER — ROUTINE PRENATAL (OUTPATIENT)
Dept: OBSTETRICS AND GYNECOLOGY | Facility: CLINIC | Age: 24
End: 2019-03-14
Payer: MEDICAID

## 2019-03-14 VITALS
WEIGHT: 187.19 LBS | SYSTOLIC BLOOD PRESSURE: 110 MMHG | DIASTOLIC BLOOD PRESSURE: 66 MMHG | BODY MASS INDEX: 36.55 KG/M2

## 2019-03-14 DIAGNOSIS — O24.313 PRE-EXISTING DIABETES MELLITUS DURING PREGNANCY IN THIRD TRIMESTER: ICD-10-CM

## 2019-03-14 DIAGNOSIS — Z3A.35 35 WEEKS GESTATION OF PREGNANCY: Primary | ICD-10-CM

## 2019-03-14 DIAGNOSIS — O34.219 PREVIOUS CESAREAN SECTION COMPLICATING PREGNANCY, ANTEPARTUM CONDITION OR COMPLICATION: ICD-10-CM

## 2019-03-14 PROCEDURE — 99212 OFFICE O/P EST SF 10 MIN: CPT | Mod: PBBFAC,TH | Performed by: OBSTETRICS & GYNECOLOGY

## 2019-03-14 PROCEDURE — 59025 FETAL NON-STRESS TEST: CPT | Mod: PBBFAC | Performed by: OBSTETRICS & GYNECOLOGY

## 2019-03-14 PROCEDURE — 59025 FETAL NON-STRESS TEST: CPT | Mod: 26,S$PBB,, | Performed by: OBSTETRICS & GYNECOLOGY

## 2019-03-14 PROCEDURE — 99213 OFFICE O/P EST LOW 20 MIN: CPT | Mod: TH,S$PBB,25, | Performed by: OBSTETRICS & GYNECOLOGY

## 2019-03-14 PROCEDURE — 99999 PR PBB SHADOW E&M-EST. PATIENT-LVL II: CPT | Mod: PBBFAC,,, | Performed by: OBSTETRICS & GYNECOLOGY

## 2019-03-14 PROCEDURE — 99999 PR PBB SHADOW E&M-EST. PATIENT-LVL II: ICD-10-PCS | Mod: PBBFAC,,, | Performed by: OBSTETRICS & GYNECOLOGY

## 2019-03-14 PROCEDURE — 99213 PR OFFICE/OUTPT VISIT, EST, LEVL III, 20-29 MIN: ICD-10-PCS | Mod: TH,S$PBB,25, | Performed by: OBSTETRICS & GYNECOLOGY

## 2019-03-14 PROCEDURE — 59025 PR FETAL 2N-STRESS TEST: ICD-10-PCS | Mod: 26,S$PBB,, | Performed by: OBSTETRICS & GYNECOLOGY

## 2019-03-14 NOTE — PROGRESS NOTES
OB here for NST.  Pt is upset because she's having a difficult time getting her insulin covered for by her insurance due to frequent adjustments. katherinen

## 2019-03-14 NOTE — TELEPHONE ENCOUNTER
Called [phone] put in a prior auth request for patients new insulin order. PA  Pending review [id number].

## 2019-03-14 NOTE — TELEPHONE ENCOUNTER
Pt is aware that she should be able to  her insulin for tomorrow.  Pt will take her long acting insulin for today to last for tomorrow. Pt will call office should she has any issues picking up her insulin. alia    ----- Message from Florinda Dumas sent at 3/14/2019  4:05 PM CDT -----  Contact: pt  Name of Who is Calling: pt      What is the request in detail: pt returned the nurse's phone call.call pt      Can the clinic reply by [facility]: no      What Number to Call Back if not in [facility]: [phone]

## 2019-03-14 NOTE — TELEPHONE ENCOUNTER
----- Message from Noemi Montiel sent at 3/14/2019  9:24 AM CDT -----  Contact: Self   Patient says she is at the eye doctor and would like to know if she cans get a letter sent over stating she can have her eyes dilated . Please call patient at [phone].  Fax: [phone].       Patient is aware that her letter has been sent to the provider she requested

## 2019-03-14 NOTE — TELEPHONE ENCOUNTER
Analog 20/20/20 has been increased to 20/23/25 by MFM provider. Spoke with shaheen and the Rx has been denied. Attempting to retrieve a PA for the insuline

## 2019-03-18 ENCOUNTER — ROUTINE PRENATAL (OUTPATIENT)
Dept: OBSTETRICS AND GYNECOLOGY | Facility: CLINIC | Age: 24
End: 2019-03-18
Payer: MEDICAID

## 2019-03-18 ENCOUNTER — LAB VISIT (OUTPATIENT)
Dept: LAB | Facility: HOSPITAL | Age: 24
End: 2019-03-18
Attending: OBSTETRICS & GYNECOLOGY
Payer: MEDICAID

## 2019-03-18 VITALS
DIASTOLIC BLOOD PRESSURE: 72 MMHG | BODY MASS INDEX: 37.42 KG/M2 | SYSTOLIC BLOOD PRESSURE: 124 MMHG | WEIGHT: 191.56 LBS

## 2019-03-18 DIAGNOSIS — O24.313 PRE-EXISTING DIABETES MELLITUS DURING PREGNANCY IN THIRD TRIMESTER: ICD-10-CM

## 2019-03-18 DIAGNOSIS — Z3A.30 30 WEEKS GESTATION OF PREGNANCY: ICD-10-CM

## 2019-03-18 DIAGNOSIS — Z3A.24 24 WEEKS GESTATION OF PREGNANCY: ICD-10-CM

## 2019-03-18 DIAGNOSIS — O34.219 PREVIOUS CESAREAN SECTION COMPLICATING PREGNANCY, ANTEPARTUM CONDITION OR COMPLICATION: ICD-10-CM

## 2019-03-18 DIAGNOSIS — Z3A.35 35 WEEKS GESTATION OF PREGNANCY: Primary | ICD-10-CM

## 2019-03-18 LAB
BASOPHILS # BLD AUTO: 0.01 K/UL
BASOPHILS NFR BLD: 0.1 %
DIFFERENTIAL METHOD: ABNORMAL
EOSINOPHIL # BLD AUTO: 0.2 K/UL
EOSINOPHIL NFR BLD: 1.8 %
ERYTHROCYTE [DISTWIDTH] IN BLOOD BY AUTOMATED COUNT: 14.7 %
ESTIMATED AVG GLUCOSE: 174 MG/DL
HBA1C MFR BLD HPLC: 7.7 %
HCT VFR BLD AUTO: 39.6 %
HGB BLD-MCNC: 13.3 G/DL
LYMPHOCYTES # BLD AUTO: 1.7 K/UL
LYMPHOCYTES NFR BLD: 16.5 %
MCH RBC QN AUTO: 26.3 PG
MCHC RBC AUTO-ENTMCNC: 33.6 G/DL
MCV RBC AUTO: 78 FL
MONOCYTES # BLD AUTO: 0.5 K/UL
MONOCYTES NFR BLD: 5 %
NEUTROPHILS # BLD AUTO: 7.9 K/UL
NEUTROPHILS NFR BLD: 76.6 %
PLATELET # BLD AUTO: 294 K/UL
PMV BLD AUTO: 10.7 FL
RBC # BLD AUTO: 5.06 M/UL
WBC # BLD AUTO: 10.3 K/UL

## 2019-03-18 PROCEDURE — 85025 COMPLETE CBC W/AUTO DIFF WBC: CPT

## 2019-03-18 PROCEDURE — 59025 FETAL NON-STRESS TEST: CPT | Mod: 26,S$PBB,, | Performed by: OBSTETRICS & GYNECOLOGY

## 2019-03-18 PROCEDURE — 99999 PR PBB SHADOW E&M-EST. PATIENT-LVL II: CPT | Mod: PBBFAC,,, | Performed by: OBSTETRICS & GYNECOLOGY

## 2019-03-18 PROCEDURE — 36415 COLL VENOUS BLD VENIPUNCTURE: CPT

## 2019-03-18 PROCEDURE — 59025 FETAL NON-STRESS TEST: CPT | Mod: PBBFAC | Performed by: OBSTETRICS & GYNECOLOGY

## 2019-03-18 PROCEDURE — 99212 OFFICE O/P EST SF 10 MIN: CPT | Mod: PBBFAC,TH,25 | Performed by: OBSTETRICS & GYNECOLOGY

## 2019-03-18 PROCEDURE — 83036 HEMOGLOBIN GLYCOSYLATED A1C: CPT

## 2019-03-18 PROCEDURE — 87081 CULTURE SCREEN ONLY: CPT

## 2019-03-18 PROCEDURE — 99213 PR OFFICE/OUTPT VISIT, EST, LEVL III, 20-29 MIN: ICD-10-PCS | Mod: TH,S$PBB,25, | Performed by: OBSTETRICS & GYNECOLOGY

## 2019-03-18 PROCEDURE — 87147 CULTURE TYPE IMMUNOLOGIC: CPT

## 2019-03-18 PROCEDURE — 99999 PR PBB SHADOW E&M-EST. PATIENT-LVL II: ICD-10-PCS | Mod: PBBFAC,,, | Performed by: OBSTETRICS & GYNECOLOGY

## 2019-03-18 PROCEDURE — 59025 PR FETAL 2N-STRESS TEST: ICD-10-PCS | Mod: 26,S$PBB,, | Performed by: OBSTETRICS & GYNECOLOGY

## 2019-03-18 PROCEDURE — 99213 OFFICE O/P EST LOW 20 MIN: CPT | Mod: TH,S$PBB,25, | Performed by: OBSTETRICS & GYNECOLOGY

## 2019-03-18 NOTE — PROGRESS NOTES
No new complaint  Ran out of insulin last week.  No insulin for a day.    NST NOTES    Patient presents for her office visit and NST.    Indication:    Pre-pregnancy diabetes mellitus.    Interpretation:    Fetal heart tones show a baseline of 140 with at least 15 bpm x 15 seconds accelerations and moderate variability.  This is reactive.  No decelerations noted.     Time:    Patient was monitored for 32 minutes for this visit    Rudy Case MD    GBS, HIV and consents    Back in 3 days for NST

## 2019-03-20 ENCOUNTER — ANESTHESIA EVENT (OUTPATIENT)
Dept: OBSTETRICS AND GYNECOLOGY | Facility: HOSPITAL | Age: 24
End: 2019-03-20
Payer: MEDICAID

## 2019-03-20 ENCOUNTER — HOSPITAL ENCOUNTER (OUTPATIENT)
Dept: PERINATAL CARE | Facility: HOSPITAL | Age: 24
Discharge: HOME | End: 2019-03-20
Attending: OBSTETRICS & GYNECOLOGY
Payer: MEDICAID

## 2019-03-20 ENCOUNTER — ANESTHESIA (OUTPATIENT)
Dept: OBSTETRICS AND GYNECOLOGY | Facility: HOSPITAL | Age: 24
End: 2019-03-20
Payer: MEDICAID

## 2019-03-20 ENCOUNTER — HOSPITAL ENCOUNTER (INPATIENT)
Facility: HOSPITAL | Age: 24
LOS: 2 days | Discharge: HOME | End: 2019-03-22
Attending: OBSTETRICS & GYNECOLOGY | Admitting: OBSTETRICS & GYNECOLOGY
Payer: MEDICAID

## 2019-03-20 DIAGNOSIS — Z98.891 STATUS POST CESAREAN SECTION: Primary | ICD-10-CM

## 2019-03-20 DIAGNOSIS — O26.90 PREGNANCY, COMPLICATIONS OF: ICD-10-CM

## 2019-03-20 DIAGNOSIS — O16.3 HYPERTENSION DURING PREGNANCY IN THIRD TRIMESTER, UNSPECIFIED HYPERTENSION IN PREGNANCY TYPE: ICD-10-CM

## 2019-03-20 DIAGNOSIS — O26.90 PREGNANCY, COMPLICATIONS OF: Primary | ICD-10-CM

## 2019-03-20 DIAGNOSIS — O24.013 PRE-EXISTING TYPE 1 DIABETES MELLITUS DURING PREGNANCY IN THIRD TRIMESTER: ICD-10-CM

## 2019-03-20 DIAGNOSIS — Z3A.36 36 WEEKS GESTATION OF PREGNANCY: ICD-10-CM

## 2019-03-20 DIAGNOSIS — E10.9 TYPE 1 DIABETES MELLITUS WITHOUT COMPLICATION: ICD-10-CM

## 2019-03-20 PROBLEM — O13.3 PREGNANCY INDUCED HYPERTENSION, THIRD TRIMESTER: Status: ACTIVE | Noted: 2019-03-20

## 2019-03-20 PROBLEM — Z71.9 HEALTH EDUCATION/COUNSELING: Status: RESOLVED | Noted: 2017-07-19 | Resolved: 2019-03-20

## 2019-03-20 PROBLEM — K52.9 ENTEROCOLITIS: Status: RESOLVED | Noted: 2017-05-14 | Resolved: 2019-03-20

## 2019-03-20 PROBLEM — O13.3 PREGNANCY INDUCED HYPERTENSION, THIRD TRIMESTER: Status: RESOLVED | Noted: 2019-03-20 | Resolved: 2019-03-20

## 2019-03-20 PROBLEM — E78.5 DYSLIPIDEMIA: Status: RESOLVED | Noted: 2017-03-13 | Resolved: 2019-03-20

## 2019-03-20 LAB
ABO + RH BLD: NORMAL
ALBUMIN SERPL BCP-MCNC: 2.4 G/DL
ALP SERPL-CCNC: 157 U/L
ALT SERPL W/O P-5'-P-CCNC: 12 U/L
ANION GAP SERPL CALC-SCNC: 7 MMOL/L
AST SERPL-CCNC: 15 U/L
BACTERIA #/AREA URNS HPF: NORMAL /HPF
BACTERIA SPEC AEROBE CULT: NORMAL
BASOPHILS # BLD AUTO: 0.01 K/UL
BASOPHILS NFR BLD: 0.1 %
BILIRUB SERPL-MCNC: 0.2 MG/DL
BILIRUB UR QL STRIP: NEGATIVE
BLD GP AB SCN CELLS X3 SERPL QL: NORMAL
BUN SERPL-MCNC: 5 MG/DL
CALCIUM SERPL-MCNC: 9.2 MG/DL
CHLORIDE SERPL-SCNC: 106 MMOL/L
CLARITY UR: CLEAR
CO2 SERPL-SCNC: 21 MMOL/L
COLOR UR: ABNORMAL
CREAT SERPL-MCNC: 0.8 MG/DL
CREAT UR-MCNC: 42.6 MG/DL
DIFFERENTIAL METHOD: ABNORMAL
EOSINOPHIL # BLD AUTO: 0.1 K/UL
EOSINOPHIL NFR BLD: 1.1 %
ERYTHROCYTE [DISTWIDTH] IN BLOOD BY AUTOMATED COUNT: 14.7 %
EST. GFR  (AFRICAN AMERICAN): >60 ML/MIN/1.73 M^2
EST. GFR  (NON AFRICAN AMERICAN): >60 ML/MIN/1.73 M^2
GLUCOSE SERPL-MCNC: 156 MG/DL
GLUCOSE UR QL STRIP: ABNORMAL
HCT VFR BLD AUTO: 38.5 %
HGB BLD-MCNC: 13 G/DL
HGB UR QL STRIP: NEGATIVE
KETONES UR QL STRIP: NEGATIVE
LDH SERPL L TO P-CCNC: 210 U/L
LEUKOCYTE ESTERASE UR QL STRIP: NEGATIVE
LYMPHOCYTES # BLD AUTO: 2.1 K/UL
LYMPHOCYTES NFR BLD: 18.4 %
MCH RBC QN AUTO: 26.1 PG
MCHC RBC AUTO-ENTMCNC: 33.8 G/DL
MCV RBC AUTO: 77 FL
MICROSCOPIC COMMENT: NORMAL
MONOCYTES # BLD AUTO: 0.5 K/UL
MONOCYTES NFR BLD: 4.6 %
NEUTROPHILS # BLD AUTO: 8.6 K/UL
NEUTROPHILS NFR BLD: 75.8 %
NITRITE UR QL STRIP: NEGATIVE
PH UR STRIP: 7 [PH] (ref 5–8)
PLATELET # BLD AUTO: 306 K/UL
PMV BLD AUTO: 10.6 FL
POCT GLUCOSE: 64 MG/DL (ref 70–110)
POCT GLUCOSE: 73 MG/DL (ref 70–110)
POCT GLUCOSE: 90 MG/DL (ref 70–110)
POTASSIUM SERPL-SCNC: 4 MMOL/L
PROT SERPL-MCNC: 6.4 G/DL
PROT UR QL STRIP: NEGATIVE
PROT UR-MCNC: 8 MG/DL
PROT/CREAT UR: 0.19 MG/G{CREAT}
RBC # BLD AUTO: 4.99 M/UL
SODIUM SERPL-SCNC: 134 MMOL/L
SP GR UR STRIP: 1.01 (ref 1–1.03)
SQUAMOUS #/AREA URNS HPF: 1 /HPF
URATE SERPL-MCNC: 5.3 MG/DL
URN SPEC COLLECT METH UR: ABNORMAL
UROBILINOGEN UR STRIP-ACNC: NEGATIVE EU/DL
WBC # BLD AUTO: 11.27 K/UL
WBC #/AREA URNS HPF: 2 /HPF (ref 0–5)
YEAST URNS QL MICRO: NORMAL

## 2019-03-20 PROCEDURE — 99211 OFF/OP EST MAY X REQ PHY/QHP: CPT | Mod: 25

## 2019-03-20 PROCEDURE — 63600175 PHARM REV CODE 636 W HCPCS: Performed by: OBSTETRICS & GYNECOLOGY

## 2019-03-20 PROCEDURE — 59514 CESAREAN DELIVERY ONLY: CPT | Mod: ANES,,, | Performed by: ANESTHESIOLOGY

## 2019-03-20 PROCEDURE — 76816 OB US FOLLOW-UP PER FETUS: CPT

## 2019-03-20 PROCEDURE — 25000003 PHARM REV CODE 250: Performed by: ANESTHESIOLOGY

## 2019-03-20 PROCEDURE — 51702 INSERT TEMP BLADDER CATH: CPT

## 2019-03-20 PROCEDURE — 76819 FETAL BIOPHYS PROFIL W/O NST: CPT | Mod: 26,,, | Performed by: OBSTETRICS & GYNECOLOGY

## 2019-03-20 PROCEDURE — 36000679 HC C/S TUBAL LIGATION, UNSCHEDULED

## 2019-03-20 PROCEDURE — 88302 TISSUE SPECIMEN TO PATHOLOGY - SURGERY: ICD-10-PCS | Mod: 26,,, | Performed by: PATHOLOGY

## 2019-03-20 PROCEDURE — 11000001 HC ACUTE MED/SURG PRIVATE ROOM

## 2019-03-20 PROCEDURE — 99213 OFFICE O/P EST LOW 20 MIN: CPT | Mod: 25,TH,, | Performed by: OBSTETRICS & GYNECOLOGY

## 2019-03-20 PROCEDURE — 59514 PRA REAN DELIVERY ONLY: ICD-10-PCS | Mod: CRNA,,, | Performed by: NURSE ANESTHETIST, CERTIFIED REGISTERED

## 2019-03-20 PROCEDURE — 63600175 PHARM REV CODE 636 W HCPCS: Performed by: ANESTHESIOLOGY

## 2019-03-20 PROCEDURE — 59514 PR CESAREAN DELIVERY ONLY: ICD-10-PCS | Mod: AT,,, | Performed by: OBSTETRICS & GYNECOLOGY

## 2019-03-20 PROCEDURE — 25000003 PHARM REV CODE 250: Performed by: OBSTETRICS & GYNECOLOGY

## 2019-03-20 PROCEDURE — 36415 COLL VENOUS BLD VENIPUNCTURE: CPT

## 2019-03-20 PROCEDURE — 83615 LACTATE (LD) (LDH) ENZYME: CPT

## 2019-03-20 PROCEDURE — S0028 INJECTION, FAMOTIDINE, 20 MG: HCPCS | Performed by: ANESTHESIOLOGY

## 2019-03-20 PROCEDURE — 76819 FETAL BIOPHYS PROFIL W/O NST: CPT

## 2019-03-20 PROCEDURE — 59514 CESAREAN DELIVERY ONLY: CPT | Mod: CRNA,,, | Performed by: NURSE ANESTHETIST, CERTIFIED REGISTERED

## 2019-03-20 PROCEDURE — 63600175 PHARM REV CODE 636 W HCPCS: Performed by: NURSE ANESTHETIST, CERTIFIED REGISTERED

## 2019-03-20 PROCEDURE — 25000003 PHARM REV CODE 250: Performed by: NURSE ANESTHETIST, CERTIFIED REGISTERED

## 2019-03-20 PROCEDURE — 58611 PR LIGATION,FALLOPIAN TUBE W/C-SECTION: ICD-10-PCS | Mod: ,,, | Performed by: OBSTETRICS & GYNECOLOGY

## 2019-03-20 PROCEDURE — 76819 PR US, OB, FETAL BIOPHYSICAL, W/O NST: ICD-10-PCS | Mod: 26,,, | Performed by: OBSTETRICS & GYNECOLOGY

## 2019-03-20 PROCEDURE — 80053 COMPREHEN METABOLIC PANEL: CPT

## 2019-03-20 PROCEDURE — 85025 COMPLETE CBC W/AUTO DIFF WBC: CPT

## 2019-03-20 PROCEDURE — 59514 CESAREAN DELIVERY ONLY: CPT | Mod: AT,,, | Performed by: OBSTETRICS & GYNECOLOGY

## 2019-03-20 PROCEDURE — 86901 BLOOD TYPING SEROLOGIC RH(D): CPT

## 2019-03-20 PROCEDURE — 88302 TISSUE EXAM BY PATHOLOGIST: CPT | Mod: 26,,, | Performed by: PATHOLOGY

## 2019-03-20 PROCEDURE — 88307 TISSUE SPECIMEN TO PATHOLOGY - SURGERY: ICD-10-PCS | Mod: 26,,, | Performed by: PATHOLOGY

## 2019-03-20 PROCEDURE — 88307 TISSUE EXAM BY PATHOLOGIST: CPT | Performed by: PATHOLOGY

## 2019-03-20 PROCEDURE — 99213 PR OFFICE/OUTPT VISIT, EST, LEVL III, 20-29 MIN: ICD-10-PCS | Mod: 25,TH,, | Performed by: OBSTETRICS & GYNECOLOGY

## 2019-03-20 PROCEDURE — 27200688 HC TRAY, SPINAL-HYPER/ ISOBARIC: Performed by: NURSE ANESTHETIST, CERTIFIED REGISTERED

## 2019-03-20 PROCEDURE — 82570 ASSAY OF URINE CREATININE: CPT

## 2019-03-20 PROCEDURE — 58611 LIGATE OVIDUCT(S) ADD-ON: CPT | Mod: ,,, | Performed by: OBSTETRICS & GYNECOLOGY

## 2019-03-20 PROCEDURE — 84550 ASSAY OF BLOOD/URIC ACID: CPT

## 2019-03-20 PROCEDURE — 88307 TISSUE EXAM BY PATHOLOGIST: CPT | Mod: 26,,, | Performed by: PATHOLOGY

## 2019-03-20 PROCEDURE — 59514 PRA REAN DELIVERY ONLY: ICD-10-PCS | Mod: ANES,,, | Performed by: ANESTHESIOLOGY

## 2019-03-20 PROCEDURE — 37000009 HC ANESTHESIA EA ADD 15 MINS: Performed by: OBSTETRICS & GYNECOLOGY

## 2019-03-20 PROCEDURE — 37000008 HC ANESTHESIA 1ST 15 MINUTES: Performed by: OBSTETRICS & GYNECOLOGY

## 2019-03-20 PROCEDURE — 81000 URINALYSIS NONAUTO W/SCOPE: CPT

## 2019-03-20 RX ORDER — MISOPROSTOL 200 UG/1
200 TABLET ORAL ONCE AS NEEDED
Status: DISCONTINUED | OUTPATIENT
Start: 2019-03-20 | End: 2019-03-22 | Stop reason: HOSPADM

## 2019-03-20 RX ORDER — SIMETHICONE 80 MG
1 TABLET,CHEWABLE ORAL EVERY 6 HOURS PRN
Status: DISCONTINUED | OUTPATIENT
Start: 2019-03-20 | End: 2019-03-22 | Stop reason: HOSPADM

## 2019-03-20 RX ORDER — ONDANSETRON 8 MG/1
8 TABLET, ORALLY DISINTEGRATING ORAL EVERY 8 HOURS PRN
Status: DISCONTINUED | OUTPATIENT
Start: 2019-03-20 | End: 2019-03-22 | Stop reason: HOSPADM

## 2019-03-20 RX ORDER — ACETAMINOPHEN 10 MG/ML
INJECTION, SOLUTION INTRAVENOUS
Status: DISCONTINUED | OUTPATIENT
Start: 2019-03-20 | End: 2019-03-20

## 2019-03-20 RX ORDER — GLUCAGON 1 MG
1 KIT INJECTION
Status: DISCONTINUED | OUTPATIENT
Start: 2019-03-20 | End: 2019-03-22 | Stop reason: HOSPADM

## 2019-03-20 RX ORDER — MUPIROCIN 20 MG/G
OINTMENT TOPICAL
Status: DISCONTINUED | OUTPATIENT
Start: 2019-03-20 | End: 2019-03-20

## 2019-03-20 RX ORDER — MUPIROCIN 20 MG/G
1 OINTMENT TOPICAL 2 TIMES DAILY
Status: DISCONTINUED | OUTPATIENT
Start: 2019-03-20 | End: 2019-03-22 | Stop reason: HOSPADM

## 2019-03-20 RX ORDER — CEFAZOLIN SODIUM 2 G/50ML
2 SOLUTION INTRAVENOUS
Status: DISCONTINUED | OUTPATIENT
Start: 2019-03-20 | End: 2019-03-20

## 2019-03-20 RX ORDER — OXYTOCIN/RINGER'S LACTATE 20/1000 ML
41.65 PLASTIC BAG, INJECTION (ML) INTRAVENOUS CONTINUOUS
Status: ACTIVE | OUTPATIENT
Start: 2019-03-20 | End: 2019-03-21

## 2019-03-20 RX ORDER — ONDANSETRON HYDROCHLORIDE 2 MG/ML
INJECTION, SOLUTION INTRAMUSCULAR; INTRAVENOUS
Status: DISCONTINUED | OUTPATIENT
Start: 2019-03-20 | End: 2019-03-20

## 2019-03-20 RX ORDER — DOCUSATE SODIUM 100 MG/1
200 CAPSULE, LIQUID FILLED ORAL 2 TIMES DAILY PRN
Status: DISCONTINUED | OUTPATIENT
Start: 2019-03-20 | End: 2019-03-22 | Stop reason: HOSPADM

## 2019-03-20 RX ORDER — OXYCODONE AND ACETAMINOPHEN 10; 325 MG/1; MG/1
1 TABLET ORAL EVERY 4 HOURS PRN
Status: DISCONTINUED | OUTPATIENT
Start: 2019-03-20 | End: 2019-03-22 | Stop reason: HOSPADM

## 2019-03-20 RX ORDER — OXYTOCIN 10 [USP'U]/ML
INJECTION, SOLUTION INTRAMUSCULAR; INTRAVENOUS
Status: DISCONTINUED | OUTPATIENT
Start: 2019-03-20 | End: 2019-03-20

## 2019-03-20 RX ORDER — BUPIVACAINE HYDROCHLORIDE 7.5 MG/ML
INJECTION, SOLUTION INTRASPINAL
Status: DISCONTINUED | OUTPATIENT
Start: 2019-03-20 | End: 2019-03-20

## 2019-03-20 RX ORDER — CEFAZOLIN SODIUM 2 G/50ML
2 SOLUTION INTRAVENOUS
Status: COMPLETED | OUTPATIENT
Start: 2019-03-20 | End: 2019-03-20

## 2019-03-20 RX ORDER — HYDROMORPHONE HCL IN 0.9% NACL 6 MG/30 ML
PATIENT CONTROLLED ANALGESIA SYRINGE INTRAVENOUS CONTINUOUS
Status: DISCONTINUED | OUTPATIENT
Start: 2019-03-20 | End: 2019-03-22 | Stop reason: HOSPADM

## 2019-03-20 RX ORDER — NALOXONE HCL 0.4 MG/ML
0.02 VIAL (ML) INJECTION
Status: DISCONTINUED | OUTPATIENT
Start: 2019-03-20 | End: 2019-03-20

## 2019-03-20 RX ORDER — IBUPROFEN 400 MG/1
800 TABLET ORAL EVERY 8 HOURS
Status: DISCONTINUED | OUTPATIENT
Start: 2019-03-22 | End: 2019-03-22 | Stop reason: HOSPADM

## 2019-03-20 RX ORDER — KETOROLAC TROMETHAMINE 30 MG/ML
30 INJECTION, SOLUTION INTRAMUSCULAR; INTRAVENOUS EVERY 6 HOURS
Status: COMPLETED | OUTPATIENT
Start: 2019-03-21 | End: 2019-03-21

## 2019-03-20 RX ORDER — SODIUM CITRATE AND CITRIC ACID MONOHYDRATE 334; 500 MG/5ML; MG/5ML
30 SOLUTION ORAL
Status: DISCONTINUED | OUTPATIENT
Start: 2019-03-20 | End: 2019-03-20

## 2019-03-20 RX ORDER — SODIUM CHLORIDE, SODIUM LACTATE, POTASSIUM CHLORIDE, CALCIUM CHLORIDE 600; 310; 30; 20 MG/100ML; MG/100ML; MG/100ML; MG/100ML
INJECTION, SOLUTION INTRAVENOUS CONTINUOUS
Status: DISCONTINUED | OUTPATIENT
Start: 2019-03-20 | End: 2019-03-22 | Stop reason: HOSPADM

## 2019-03-20 RX ORDER — OXYTOCIN/RINGER'S LACTATE 20/1000 ML
41.7 PLASTIC BAG, INJECTION (ML) INTRAVENOUS CONTINUOUS
Status: DISCONTINUED | OUTPATIENT
Start: 2019-03-20 | End: 2019-03-20

## 2019-03-20 RX ORDER — INSULIN ASPART 100 [IU]/ML
1-10 INJECTION, SOLUTION INTRAVENOUS; SUBCUTANEOUS
Status: DISCONTINUED | OUTPATIENT
Start: 2019-03-20 | End: 2019-03-22 | Stop reason: HOSPADM

## 2019-03-20 RX ORDER — DIPHENHYDRAMINE HCL 25 MG
25 CAPSULE ORAL EVERY 4 HOURS PRN
Status: DISCONTINUED | OUTPATIENT
Start: 2019-03-20 | End: 2019-03-22 | Stop reason: HOSPADM

## 2019-03-20 RX ORDER — ACETAMINOPHEN 325 MG/1
650 TABLET ORAL EVERY 6 HOURS PRN
Status: DISCONTINUED | OUTPATIENT
Start: 2019-03-20 | End: 2019-03-22 | Stop reason: HOSPADM

## 2019-03-20 RX ORDER — SODIUM CHLORIDE, SODIUM LACTATE, POTASSIUM CHLORIDE, CALCIUM CHLORIDE 600; 310; 30; 20 MG/100ML; MG/100ML; MG/100ML; MG/100ML
INJECTION, SOLUTION INTRAVENOUS CONTINUOUS
Status: DISCONTINUED | OUTPATIENT
Start: 2019-03-20 | End: 2019-03-20

## 2019-03-20 RX ORDER — MISOPROSTOL 200 UG/1
800 TABLET ORAL
Status: DISCONTINUED | OUTPATIENT
Start: 2019-03-20 | End: 2019-03-20

## 2019-03-20 RX ORDER — DIPHENHYDRAMINE HYDROCHLORIDE 50 MG/ML
25 INJECTION INTRAMUSCULAR; INTRAVENOUS EVERY 4 HOURS PRN
Status: DISCONTINUED | OUTPATIENT
Start: 2019-03-20 | End: 2019-03-22 | Stop reason: HOSPADM

## 2019-03-20 RX ORDER — OXYTOCIN/RINGER'S LACTATE 20/1000 ML
333 PLASTIC BAG, INJECTION (ML) INTRAVENOUS CONTINUOUS
Status: DISCONTINUED | OUTPATIENT
Start: 2019-03-20 | End: 2019-03-20

## 2019-03-20 RX ORDER — FENTANYL CITRATE 50 UG/ML
INJECTION, SOLUTION INTRAMUSCULAR; INTRAVENOUS
Status: DISCONTINUED | OUTPATIENT
Start: 2019-03-20 | End: 2019-03-20

## 2019-03-20 RX ORDER — SODIUM CITRATE AND CITRIC ACID MONOHYDRATE 334; 500 MG/5ML; MG/5ML
30 SOLUTION ORAL ONCE
Status: COMPLETED | OUTPATIENT
Start: 2019-03-20 | End: 2019-03-20

## 2019-03-20 RX ORDER — PHENYLEPHRINE HYDROCHLORIDE 10 MG/ML
INJECTION INTRAVENOUS
Status: DISCONTINUED | OUTPATIENT
Start: 2019-03-20 | End: 2019-03-20

## 2019-03-20 RX ORDER — FAMOTIDINE 10 MG/ML
20 INJECTION INTRAVENOUS ONCE
Status: COMPLETED | OUTPATIENT
Start: 2019-03-20 | End: 2019-03-20

## 2019-03-20 RX ORDER — OXYCODONE AND ACETAMINOPHEN 5; 325 MG/1; MG/1
1 TABLET ORAL EVERY 4 HOURS PRN
Status: DISCONTINUED | OUTPATIENT
Start: 2019-03-20 | End: 2019-03-22 | Stop reason: HOSPADM

## 2019-03-20 RX ADMIN — SODIUM CITRATE AND CITRIC ACID MONOHYDRATE 30 ML: 500; 334 SOLUTION ORAL at 08:03

## 2019-03-20 RX ADMIN — MUPIROCIN 1 G: 20 OINTMENT TOPICAL at 11:03

## 2019-03-20 RX ADMIN — OXYTOCIN 20 UNITS: 10 INJECTION, SOLUTION INTRAMUSCULAR; INTRAVENOUS at 10:03

## 2019-03-20 RX ADMIN — FENTANYL CITRATE 10 MCG: 50 INJECTION INTRAMUSCULAR; INTRAVENOUS at 09:03

## 2019-03-20 RX ADMIN — PHENYLEPHRINE HYDROCHLORIDE 50 MCG: 10 INJECTION INTRAVENOUS at 09:03

## 2019-03-20 RX ADMIN — FENTANYL CITRATE 40 MCG: 50 INJECTION INTRAMUSCULAR; INTRAVENOUS at 09:03

## 2019-03-20 RX ADMIN — SODIUM CHLORIDE, SODIUM LACTATE, POTASSIUM CHLORIDE, AND CALCIUM CHLORIDE 1000 ML: .6; .31; .03; .02 INJECTION, SOLUTION INTRAVENOUS at 08:03

## 2019-03-20 RX ADMIN — ONDANSETRON 4 MG: 2 INJECTION, SOLUTION INTRAMUSCULAR; INTRAVENOUS at 09:03

## 2019-03-20 RX ADMIN — SODIUM CHLORIDE, SODIUM LACTATE, POTASSIUM CHLORIDE, AND CALCIUM CHLORIDE: .6; .31; .03; .02 INJECTION, SOLUTION INTRAVENOUS at 09:03

## 2019-03-20 RX ADMIN — BUPIVACAINE HYDROCHLORIDE IN DEXTROSE 1.4 ML: 7.5 INJECTION, SOLUTION SUBARACHNOID at 09:03

## 2019-03-20 RX ADMIN — SODIUM CHLORIDE, SODIUM LACTATE, POTASSIUM CHLORIDE, AND CALCIUM CHLORIDE 1000 ML: .6; .31; .03; .02 INJECTION, SOLUTION INTRAVENOUS at 05:03

## 2019-03-20 RX ADMIN — SODIUM CHLORIDE, SODIUM LACTATE, POTASSIUM CHLORIDE, AND CALCIUM CHLORIDE: .6; .31; .03; .02 INJECTION, SOLUTION INTRAVENOUS at 10:03

## 2019-03-20 RX ADMIN — Medication: at 11:03

## 2019-03-20 RX ADMIN — CEFAZOLIN SODIUM 2 G: 2 SOLUTION INTRAVENOUS at 08:03

## 2019-03-20 RX ADMIN — SODIUM CHLORIDE, SODIUM LACTATE, POTASSIUM CHLORIDE, AND CALCIUM CHLORIDE: .6; .31; .03; .02 INJECTION, SOLUTION INTRAVENOUS at 06:03

## 2019-03-20 RX ADMIN — ACETAMINOPHEN 1000 MG: 10 INJECTION, SOLUTION INTRAVENOUS at 09:03

## 2019-03-20 RX ADMIN — FAMOTIDINE 20 MG: 10 INJECTION INTRAVENOUS at 08:03

## 2019-03-20 RX ADMIN — OXYTOCIN 20 UNITS: 10 INJECTION, SOLUTION INTRAMUSCULAR; INTRAVENOUS at 09:03

## 2019-03-20 RX ADMIN — KETOROLAC TROMETHAMINE 30 MG: 30 INJECTION, SOLUTION INTRAMUSCULAR; INTRAVENOUS at 11:03

## 2019-03-20 RX ADMIN — FENTANYL CITRATE 50 MCG: 50 INJECTION INTRAMUSCULAR; INTRAVENOUS at 09:03

## 2019-03-20 NOTE — PROGRESS NOTES
"Indication  ========    F/U Consultation: BS Check/BPP.    History  ======    Previous Outcomes  Preg. no. 1  Outcome: Termination of pregnancy - surgical  Preg. no. 2  Outcome: Live birth  Gest. age 37 w + 1 d  Gender: male  Details: c/s   3  Para 1  Dahl children born living (T) 1  Dahl children born (T) 1  Abortions (A) 1  Dahl living children (L) 1  Terminations 1  Risk Factors  History risk factors: Diabetes mellitus  Details: Type I    Maternal Assessment  =================    Weight 87 kg  Weight (lb) 192 lb  BP syst 149 mmHg  BP diast 73 mmHg  Other: Repeat Blood Pressure: 143/82    Method  ======    Transabdominal ultrasound examination, 2D Color Doppler, [place] E6. View: Sufficient.    Pregnancy  =========    Dahl pregnancy. Number of fetuses: 1.    Dating  ======    Cycle: regular cycle  GA by "stated dating" 36 w + 0 d  RIKA by "stated dating": 2019  Assigned: Dating performed on 2018, based on the external assessment  Assigned GA 36 w + 0 d  Assigned RIKA: 2019    General Evaluation  ==============    Cardiac activity: present.  bpm.  Fetal movements: visualized.  Presentation: cephalic.  Placenta: anterior.    Amniotic Fluid Assessment  =====================    Amount of AF: normal amount  MVP 5.4 cm. KAMILLA 12.2 cm. Q1 5.4 cm, Q2 0.0 cm, Q3 3.7 cm, Q4 3.1 cm    Biophysical Profile  ==============    2: Fetal breathing movements  2: Gross body movements  2: Fetal tone  2: Amniotic fluid volume  : Biophysical profile score    Consultation  ==========    Type: Follow up.  HgbA1c increased to 7.7.Taking Basaglar 40/16 and Lispro /  Patient's blood sugars are labile-ran out of insulin one day  Fastin, 167, 83, 51, 119  2 hour post breakfast: 198, 152, 187, 129  2 hour post lunch: 251, 95, 147, 64  2 hour post dinner: 318, 110, 102, 157    Reports am blood sugar today 157 (went low last night)    BP increased (New) with new onset headache. Has " visual changes with diabetes.  Discussed with patient risks of prematurity and risks of infants of diabetic mothers.  Discussed risk of stillbirth if undelivered.  Recommend delivery due to poorly controlled diabetes and new onset HTN.  TO L AND D FOR DELIVERY.  Advised to be NPO.  Recommend patient make follow up appointment with endocrinology while inpatient.  Recommend patient have evaluation for preeclampsia and would recommend magnesium sulfate prophylaxis given elevated BP and  headache.  Recommend checking patient's blood sugar and making sure no evidence of DKA. DKA would need corrected before delivery.  Recommend delivery today-timing will need to be coordinated with OB and anesthesia.  Would recommend 1/3 to 1/2 of patient's pregnancy insulin dosing postpartum. Patient may need some sliding scale coverage.  Continuous EFM.  NO betamethasone.  Patient ate at 1pm and drank water 30 minutes ago.  Discussed with Dr. Case-patient sent to [facility]. He agrees to plan of care.    15 minutes was spent in face to face time with greater than half of that time spent in counseling and coordination of care.      Impression  =========    Dahl live intrauterine pregnancy.  BPP 8/8.  Normal amniotic fluid volume.    Recommendation  ==============    TO L AND D FOR DELIVERY.  Advised to be NPO.  Recommend patient make follow up appointment with endocrinology while inpatient.  Recommend patient have evaluation for preeclampsia and would recommend magnesium sulfate prophylaxis given elevated BP and  headache.  Recommend checking patient's blood sugar and making sure no evidence of DKA. DKA would need corrected before delivery.  Recommend delivery today-timing will need to be coordinated with OB and anesthesia.  Would recommend 1/3 to 1/2 of patient's pregnancy insulin dosing postpartum. Patient may need some sliding scale coverage.  Continuous EFM.  NO betamethasone.

## 2019-03-20 NOTE — H&P
"[facility]  Obstetrics  History & Physical    Patient Name: Jay Schmid  MRN: [id number]  Admission Date: 3/20/2019  Primary Care Provider: Primary Doctor No    Subjective:     Principal Problem:36 weeks gestation of pregnancy    History of Present Illness:  22 yo  at 36w  Long history of diabetes mellitus.  Poorly-controlled on insulin.  Has been followed by MFM  Has been getting NST twice a week.  Came in today to see Dr Lanza.  Had headache and blurry vision earlier today.  BP 140s/80s  Per Dr Lanza, patient should be delivered today.    She ate at 1330 today, 3/20/2019  No other complaint    Obstetric HPI:  Patient reports None contractions, active fetal movement, No vaginal bleeding , No loss of fluid     This pregnancy has been complicated by uncontrolled diabetes mellitus.      Obstetric History       T1      L1     SAB0   TAB1   Ectopic0   Multiple0   Live Births1       # Outcome Date GA Lbr Favian/2nd Weight Sex Delivery Anes PTL Lv   3 Current            2 Term 10/17/14 37w1d  2.999 kg (6 lb 9.8 oz) M CS-LTranv EPI  KARISSA      Apgar1:  8                Apgar5: 9   1 TAB 13                Past Medical History:   Diagnosis Date    Diabetes mellitus type I     Heart murmur     Sickle cell trait      Past Surgical History:   Procedure Laterality Date     SECTION      cyst removed on buttox  2011    DELIVERY-CEASAREAN SECTION N/A 10/16/2014    [facility]    DILATION AND CURETTAGE OF UTERUS  13    I&D VULVA ABCESS Left 2014    [facility] at [facility]    PILONIDAL CYST DRAINAGE         [facility] Medications   Medication Sig    insulin (BASAGLAR KWIKPEN U-100 INSULIN) glargine 100 units/mL (3mL) SubQ pen Inject 40 Units into the skin once daily. 40 units in AM 16 units at bedtime    insulin lispro 100 unit/mL injection 20 units with each meal    BD ULTRA-FINE GABRIELE PEN NEEDLE 32 gauge x 5/32" " "Ndle USE TO INJECT QID    blood glucose strip-disp meter Kit Preferred by insurance, use as directed.    blood sugar diagnostic Strp To check BG 6x times daily, to use with insurance preferred meter    insulin syringe-needle U-100 (BD INSULIN SYRINGE) 0.3 mL 29 gauge x 1/2" Syrg 1 Syringe by Misc.(Non-Drug; Combo Route) route 4 (four) times daily.    lancets Misc To check BG 6x times daily, to use with insurance preferred meter    pen needle, diabetic (BD ULTRA-FINE GABRIELE PEN NEEDLE) 32 gauge x 5/32" Ndle USE TO INJECT FOUR TIMES DAILY    promethazine (PHENERGAN) 25 MG tablet Take 1 tablet (25 mg total) by mouth every 4 (four) hours.    VITAMIN B-6 25 MG tablet TK 1 T PO Q 6 H PRF NAUSEA       Review of patient's allergies indicates:   Allergen Reactions    Clindamycin Anaphylaxis and Hives        Family History     Problem Relation (Age of Onset)    Asthma Brother    Congenital heart disease Son    Diabetes Paternal Grandmother    Hypertension Paternal Grandmother    Thyroid disease Mother        Tobacco Use    Smoking status: Never Smoker    Smokeless tobacco: Never Used   Substance and Sexual Activity    Alcohol use: No    Drug use: No    Sexual activity: Yes     Partners: Male     Birth control/protection: IUD     Review of Systems   Constitutional: Positive for fatigue. Negative for activity change, appetite change, fever and unexpected weight change.   Respiratory: Negative for cough, shortness of breath and wheezing.    Cardiovascular: Negative for chest pain and palpitations.   Gastrointestinal: Positive for abdominal pain and nausea. Negative for vomiting.   Endocrine: Negative for hot flashes.   Genitourinary: Positive for frequency, pelvic pain and vaginal discharge. Negative for dysmenorrhea, dyspareunia, urgency, vaginal bleeding and postcoital bleeding.   Musculoskeletal: Positive for back pain. Negative for myalgias.   Integumentary:  Negative for rash, breast mass and nipple discharge. "   Neurological: Positive for headaches. Negative for seizures.   Psychiatric/Behavioral: Negative for depression and sleep disturbance. The patient is not nervous/anxious.    Breast: Negative for mass, mastodynia and nipple discharge     Objective:     Vital Signs (Most Recent):  Pulse: 74 (03/20/19 1705)  BP: 125/78 (03/20/19 1705) Vital Signs (24h Range):  Pulse:  [68-74] 74  BP: (124-137)/(74-81) 125/78     Weight: 86.2 kg (190 lb)  Body mass index is 37.11 kg/m².    FHT: 150 Cat 1 (reassuring)  TOCO: Irregular contractions    Physical Exam:   Constitutional: She appears well-developed and well-nourished. No distress.    HENT:   Head: Normocephalic and atraumatic.    Eyes: EOM are normal.    Neck: Normal range of motion.     Pulmonary/Chest: Effort normal. No respiratory distress.   Breasts: Non-tender, no engorgement, no masses, no retraction, no discharge. Negative for lymphadenopathy.         Abdominal: Soft. She exhibits no distension. There is no tenderness. There is no rebound and no guarding.   Gravid     Genitourinary: Vagina normal and uterus normal. No vaginal discharge found.   Genitourinary Comments: Vulva without any obvious lesions.  Vaginal vault with good support.  Minimal white discharge noted.  No obvious lesion.  Cervix is without any cervical motion tenderness.  No obvious lesion.  Uterus is small, non-tender, normal contour.  Adnexa is without any masses or tenderness.           Musculoskeletal: Normal range of motion.       Neurological: She is alert.    Skin: Skin is warm and dry.    Psychiatric: She has a normal mood and affect.       Cervix:  Dilation:  0  Effacement:  25%  Station: -3  Presentation: Vertex     Significant Labs:  Lab Results   Component Value Date    GROUPTRH A POS 09/17/2018    HEPBSAG Negative 09/17/2018    STREPBCULT  03/18/2019     STREPTOCOCCUS AGALACTIAE (GROUP B)  In case of Penicillin allergy, call lab for further testing.  Beta-hemolytic streptococci are  routinely susceptible to   penicillins,cephalosporins and carbapenems.         CBC:   Recent Labs   Lab 19  1644   WBC 11.27   RBC 4.99   HGB 13.0   HCT 38.5      MCV 77*   MCH 26.1*   MCHC 33.8     CMP:   Recent Labs   Lab 19  1644   *   CALCIUM 9.2   ALBUMIN 2.4*   PROT 6.4   *   K 4.0   CO2 21*      BUN 5*   CREATININE 0.8   ALKPHOS 157*   ALT 12   AST 15   BILITOT 0.2     No results for input(s): COLORU, CLARITYU, SPECGRAV, PHUR, PROTEINUA, GLUCOSEU, BILIRUBINCON, BLOODU, WBCU, RBCU, BACTERIA, MUCUS, NITRITE, LEUKOCYTESUR, UROBILINOGEN, HYALINECASTS in the last 48 hours.  I have personallly reviewed all pertinent lab results from the last 24 hours.    Assessment/Plan:     23 y.o. female  at 36w0d for:    * 36 weeks gestation of pregnancy    Proceed with RCS/BTL tonight.  Would need to wait 8 hours from her last meal.     Pregnancy induced hypertension, third trimester    Proceed with RCS/BTL tonight.  Would need to wait 8 hours from her last meal.     Type 1 diabetes mellitus without complication    AccuChek at 170 on admission         Rudy Case MD  Obstetrics  [facility] [place]

## 2019-03-20 NOTE — SUBJECTIVE & OBJECTIVE
"Obstetric HPI:  Patient reports None contractions, active fetal movement, No vaginal bleeding , No loss of fluid     This pregnancy has been complicated by uncontrolled diabetes mellitus.      Obstetric History       T1      L1     SAB0   TAB1   Ectopic0   Multiple0   Live Births1       # Outcome Date GA Lbr Favian/2nd Weight Sex Delivery Anes PTL Lv   3 Current            2 Term 10/17/14 37w1d  2.999 kg (6 lb 9.8 oz) M CS-LTranv EPI  KARISSA      Apgar1:  8                Apgar5: 9   1 TAB 13                Past Medical History:   Diagnosis Date    Diabetes mellitus type I     Heart murmur     Sickle cell trait      Past Surgical History:   Procedure Laterality Date     SECTION      cyst removed on buttox  2011    DELIVERY-CEASAREAN SECTION N/A 10/16/2014    [facility] at [facility] L[facility]D OR    DILATION AND CURETTAGE OF UTERUS  13    I&D VULVA ABCESS Left 2014    [facility] at [facility] OR    PILONIDAL CYST DRAINAGE         [facility] Medications   Medication Sig    insulin (BASAGLAR KWIKPEN U-100 INSULIN) glargine 100 units/mL (3mL) SubQ pen Inject 40 Units into the skin once daily. 40 units in AM 16 units at bedtime    insulin lispro 100 unit/mL injection 20 units with each meal    BD ULTRA-FINE GABRIELE PEN NEEDLE 32 gauge x 5/32" Ndle USE TO INJECT QID    blood glucose strip-disp meter Kit Preferred by insurance, use as directed.    blood sugar diagnostic Strp To check BG 6x times daily, to use with insurance preferred meter    insulin syringe-needle U-100 (BD INSULIN SYRINGE) 0.3 mL 29 gauge x 1/2" Syrg 1 Syringe by Misc.(Non-Drug; Combo Route) route 4 (four) times daily.    lancets Misc To check BG 6x times daily, to use with insurance preferred meter    pen needle, diabetic (BD ULTRA-FINE GABRIELE PEN NEEDLE) 32 gauge x 5/32" Ndle USE TO INJECT FOUR TIMES DAILY    promethazine (PHENERGAN) 25 MG tablet Take 1 tablet (25 mg total) by mouth every " 4 (four) hours.    VITAMIN B-6 25 MG tablet TK 1 T PO Q 6 H PRF NAUSEA       Review of patient's allergies indicates:   Allergen Reactions    Clindamycin Anaphylaxis and Hives        Family History     Problem Relation (Age of Onset)    Asthma Brother    Congenital heart disease Son    Diabetes Paternal Grandmother    Hypertension Paternal Grandmother    Thyroid disease Mother        Tobacco Use    Smoking status: Never Smoker    Smokeless tobacco: Never Used   Substance and Sexual Activity    Alcohol use: No    Drug use: No    Sexual activity: Yes     Partners: Male     Birth control/protection: IUD     Review of Systems   Constitutional: Positive for fatigue. Negative for activity change, appetite change, fever and unexpected weight change.   Respiratory: Negative for cough, shortness of breath and wheezing.    Cardiovascular: Negative for chest pain and palpitations.   Gastrointestinal: Positive for abdominal pain and nausea. Negative for vomiting.   Endocrine: Negative for hot flashes.   Genitourinary: Positive for frequency, pelvic pain and vaginal discharge. Negative for dysmenorrhea, dyspareunia, urgency, vaginal bleeding and postcoital bleeding.   Musculoskeletal: Positive for back pain. Negative for myalgias.   Integumentary:  Negative for rash, breast mass and nipple discharge.   Neurological: Positive for headaches. Negative for seizures.   Psychiatric/Behavioral: Negative for depression and sleep disturbance. The patient is not nervous/anxious.    Breast: Negative for mass, mastodynia and nipple discharge     Objective:     Vital Signs (Most Recent):  Pulse: 74 (03/20/19 1705)  BP: 125/78 (03/20/19 1705) Vital Signs (24h Range):  Pulse:  [68-74] 74  BP: (124-137)/(74-81) 125/78     Weight: 86.2 kg (190 lb)  Body mass index is 37.11 kg/m².    FHT: 150 Cat 1 (reassuring)  TOCO: Irregular contractions    Physical Exam:   Constitutional: She appears well-developed and well-nourished. No distress.     HENT:   Head: Normocephalic and atraumatic.    Eyes: EOM are normal.    Neck: Normal range of motion.     Pulmonary/Chest: Effort normal. No respiratory distress.   Breasts: Non-tender, no engorgement, no masses, no retraction, no discharge. Negative for lymphadenopathy.         Abdominal: Soft. She exhibits no distension. There is no tenderness. There is no rebound and no guarding.   Gravid     Genitourinary: Vagina normal and uterus normal. No vaginal discharge found.   Genitourinary Comments: Vulva without any obvious lesions.  Vaginal vault with good support.  Minimal white discharge noted.  No obvious lesion.  Cervix is without any cervical motion tenderness.  No obvious lesion.  Uterus is small, non-tender, normal contour.  Adnexa is without any masses or tenderness.           Musculoskeletal: Normal range of motion.       Neurological: She is alert.    Skin: Skin is warm and dry.    Psychiatric: She has a normal mood and affect.       Cervix:  Dilation:  0  Effacement:  25%  Station: -3  Presentation: Vertex     Significant Labs:  Lab Results   Component Value Date    GROUPTRH A POS 09/17/2018    HEPBSAG Negative 09/17/2018    STREPBCULT  03/18/2019     STREPTOCOCCUS AGALACTIAE (GROUP B)  In case of Penicillin allergy, call lab for further testing.  Beta-hemolytic streptococci are routinely susceptible to   penicillins,cephalosporins and carbapenems.         CBC:   Recent Labs   Lab 03/20/19  1644   WBC 11.27   RBC 4.99   HGB 13.0   HCT 38.5      MCV 77*   MCH 26.1*   MCHC 33.8     CMP:   Recent Labs   Lab 03/20/19  1644   *   CALCIUM 9.2   ALBUMIN 2.4*   PROT 6.4   *   K 4.0   CO2 21*      BUN 5*   CREATININE 0.8   ALKPHOS 157*   ALT 12   AST 15   BILITOT 0.2     No results for input(s): COLORU, CLARITYU, SPECGRAV, PHUR, PROTEINUA, GLUCOSEU, BILIRUBINCON, BLOODU, WBCU, RBCU, BACTERIA, MUCUS, NITRITE, LEUKOCYTESUR, UROBILINOGEN, HYALINECASTS in the last 48 hours.  I have  personallly reviewed all pertinent lab results from the last 24 hours.

## 2019-03-20 NOTE — PLAN OF CARE
Problem: Adult Inpatient Plan of Care  Goal: Plan of Care Review  Outcome: Ongoing (interventions implemented as appropriate)  POC reviewed with pt, pt verb understanding  Boy  Dr. Jadyn zavala  Circ desired  Hep B vaccine desired

## 2019-03-20 NOTE — HPI
22 yo  at 36w  Long history of diabetes mellitus.  Poorly-controlled on insulin.  Has been followed by MFM  Has been getting NST twice a week.  Came in today to see Dr Lanza.  Had headache and blurry vision earlier today.  BP 140s/80s  Per Dr Lanza, patient should be delivered today.    She ate at 1330 today, 3/20/2019  No other complaint

## 2019-03-20 NOTE — ANESTHESIA PREPROCEDURE EVALUATION
03/20/2019  Jay Schmid is a 23 y.o., female.    Anesthesia Evaluation    I have reviewed the Patient Summary Reports.    I have reviewed the Nursing Notes.      Review of Systems  Anesthesia Hx:  No problems with previous Anesthesia  Denies Family Hx of Anesthesia complications.   Denies Personal Hx of Anesthesia complications.   Social:  Non-Smoker, No Alcohol Use    Hematology/Oncology:        Hematology Comments: No bleeding disorder   Cardiovascular:   Hypertension Gestational; sent to L&D by [facility] for concern of elevated BP (140s/80s), and new HA, blurry vision; highest BP on L&D 140's/60's, and pt states symptoms almost completely gone   Pulmonary:   Denies Shortness of breath.    Hepatic/GI:  Hepatic/GI Normal    OB/GYN/PEDS:  IUP   Neurological:  Neurology Normal    Endocrine:   Diabetes, poorly controlled        Physical Exam  General:  Well nourished    Airway/Jaw/Neck:  Airway Findings: Mouth Opening: Normal Tongue: Normal  Mallampati: II  TM Distance: 4 - 6 cm     Eyes/Ears/Nose:  Eyes/Ears/Nose Findings: Nose ring; advised pt to remove    Dental:  Dental Findings: In tact   Chest/Lungs:  Chest/Lungs Findings: Clear to auscultation, Normal Respiratory Rate     Heart/Vascular:  Heart Findings: Rate: Normal  Rhythm: Regular Rhythm        Mental Status:  Mental Status Findings:  Cooperative, Alert and Oriented       Wt Readings from Last 3 Encounters:   03/20/19 86.2 kg (190 lb)   03/18/19 86.9 kg (191 lb 9.3 oz)   03/14/19 84.9 kg (187 lb 2.7 oz)     Temp Readings from Last 3 Encounters:   02/09/19 37.2 °C (98.9 °F) (Oral)   12/24/18 36.9 °C (98.4 °F) (Oral)   10/07/18 36.8 °C (98.3 °F) (Oral)     BP Readings from Last 3 Encounters:   03/20/19 (!) 145/65   03/18/19 124/72   03/14/19 110/66     Pulse Readings from Last 3 Encounters:   03/20/19 66   02/09/19 87   12/24/18 81     Lab Results    Component Value Date    WBC 11.27 03/20/2019    HGB 13.0 03/20/2019    HCT 38.5 03/20/2019    MCV 77 (L) 03/20/2019     03/20/2019     CMP  Sodium   Date Value Ref Range Status   03/20/2019 134 (L) 136 - 145 mmol/L Final     Potassium   Date Value Ref Range Status   03/20/2019 4.0 3.5 - 5.1 mmol/L Final     Chloride   Date Value Ref Range Status   03/20/2019 106 95 - 110 mmol/L Final     CO2   Date Value Ref Range Status   03/20/2019 21 (L) 23 - 29 mmol/L Final     Glucose   Date Value Ref Range Status   03/20/2019 156 (H) 70 - 110 mg/dL Final     BUN, Bld   Date Value Ref Range Status   03/20/2019 5 (L) 6 - 20 mg/dL Final     Creatinine   Date Value Ref Range Status   03/20/2019 0.8 0.5 - 1.4 mg/dL Final     Calcium   Date Value Ref Range Status   03/20/2019 9.2 8.7 - 10.5 mg/dL Final     Total Protein   Date Value Ref Range Status   03/20/2019 6.4 6.0 - 8.4 g/dL Final     Albumin   Date Value Ref Range Status   03/20/2019 2.4 (L) 3.5 - 5.2 g/dL Final     Total Bilirubin   Date Value Ref Range Status   03/20/2019 0.2 0.1 - 1.0 mg/dL Final     Comment:     For infants and newborns, interpretation of results should be based  on gestational age, weight and in agreement with clinical  observations.  Premature Infant recommended reference ranges:  Up to 24 hours.............<8.0 mg/dL  Up to 48 hours............<12.0 mg/dL  3-5 days..................<15.0 mg/dL  6-29 days.................<15.0 mg/dL       Alkaline Phosphatase   Date Value Ref Range Status   03/20/2019 157 (H) 55 - 135 U/L Final     AST   Date Value Ref Range Status   03/20/2019 15 10 - 40 U/L Final     ALT   Date Value Ref Range Status   03/20/2019 12 10 - 44 U/L Final     Anion Gap   Date Value Ref Range Status   03/20/2019 7 (L) 8 - 16 mmol/L Final     eGFR if    Date Value Ref Range Status   03/20/2019 >60 >60 mL/min/1.73 m^2 Final     eGFR if non    Date Value Ref Range Status   03/20/2019 >60 >60 mL/min/1.73  m^2 Final     Comment:     Calculation used to obtain the estimated glomerular filtration  rate (eGFR) is the CKD-EPI equation.            Anesthesia Plan  Type of Anesthesia, risks & benefits discussed:  Anesthesia Type:  spinal, general  Patient's Preference:   Intra-op Monitoring Plan: standard ASA monitors  Intra-op Monitoring Plan Comments:   Post Op Pain Control Plan: multimodal analgesia, PCA and per primary service following discharge from PACU  Post Op Pain Control Plan Comments:   Induction:    Beta Blocker:  Patient is not currently on a Beta-Blocker (No further documentation required).       Informed Consent: Patient understands risks and agrees with Anesthesia plan.  Questions answered. Anesthesia consent signed with patient.  ASA Score: 2     Day of Surgery Review of History & Physical: I have interviewed and examined the patient. I have reviewed the patient's H&P dated:        Anesthesia Plan Notes: Last ate at 1PM (3/20/19)        Ready For Surgery From Anesthesia Perspective.

## 2019-03-21 PROBLEM — Z98.891 STATUS POST CESAREAN DELIVERY: Status: RESOLVED | Noted: 2019-03-20 | Resolved: 2019-03-21

## 2019-03-21 LAB
BASOPHILS # BLD AUTO: 0.01 K/UL
BASOPHILS NFR BLD: 0.1 %
DIFFERENTIAL METHOD: ABNORMAL
EOSINOPHIL # BLD AUTO: 0.1 K/UL
EOSINOPHIL NFR BLD: 0.4 %
ERYTHROCYTE [DISTWIDTH] IN BLOOD BY AUTOMATED COUNT: 14.7 %
HCT VFR BLD AUTO: 37.7 %
HGB BLD-MCNC: 12.8 G/DL
LYMPHOCYTES # BLD AUTO: 2.2 K/UL
LYMPHOCYTES NFR BLD: 15.1 %
MCH RBC QN AUTO: 26.4 PG
MCHC RBC AUTO-ENTMCNC: 34 G/DL
MCV RBC AUTO: 78 FL
MONOCYTES # BLD AUTO: 0.7 K/UL
MONOCYTES NFR BLD: 5 %
NEUTROPHILS # BLD AUTO: 11.3 K/UL
NEUTROPHILS NFR BLD: 79.4 %
PLATELET # BLD AUTO: 274 K/UL
PMV BLD AUTO: 10.5 FL
POCT GLUCOSE: 114 MG/DL (ref 70–110)
POCT GLUCOSE: 134 MG/DL (ref 70–110)
POCT GLUCOSE: 143 MG/DL (ref 70–110)
POCT GLUCOSE: 163 MG/DL (ref 70–110)
POCT GLUCOSE: 222 MG/DL (ref 70–110)
POCT GLUCOSE: 225 MG/DL (ref 70–110)
POCT GLUCOSE: 67 MG/DL (ref 70–110)
POCT GLUCOSE: 72 MG/DL (ref 70–110)
RBC # BLD AUTO: 4.84 M/UL
WBC # BLD AUTO: 14.2 K/UL

## 2019-03-21 PROCEDURE — 11000001 HC ACUTE MED/SURG PRIVATE ROOM

## 2019-03-21 PROCEDURE — S5571 INSULIN DISPOS PEN 3 ML: HCPCS | Performed by: OBSTETRICS & GYNECOLOGY

## 2019-03-21 PROCEDURE — 36415 COLL VENOUS BLD VENIPUNCTURE: CPT

## 2019-03-21 PROCEDURE — 63600175 PHARM REV CODE 636 W HCPCS: Performed by: OBSTETRICS & GYNECOLOGY

## 2019-03-21 PROCEDURE — 25000003 PHARM REV CODE 250: Performed by: OBSTETRICS & GYNECOLOGY

## 2019-03-21 PROCEDURE — 85025 COMPLETE CBC W/AUTO DIFF WBC: CPT

## 2019-03-21 RX ADMIN — MUPIROCIN 1 G: 20 OINTMENT TOPICAL at 08:03

## 2019-03-21 RX ADMIN — INSULIN HUMAN 10 UNITS: 100 INJECTION, SOLUTION PARENTERAL at 04:03

## 2019-03-21 RX ADMIN — OXYCODONE HYDROCHLORIDE AND ACETAMINOPHEN 1 TABLET: 10; 325 TABLET ORAL at 06:03

## 2019-03-21 RX ADMIN — KETOROLAC TROMETHAMINE 30 MG: 30 INJECTION, SOLUTION INTRAMUSCULAR; INTRAVENOUS at 05:03

## 2019-03-21 RX ADMIN — INSULIN ASPART 4 UNITS: 100 INJECTION, SOLUTION INTRAVENOUS; SUBCUTANEOUS at 02:03

## 2019-03-21 RX ADMIN — INSULIN HUMAN 10 UNITS: 100 INJECTION, SOLUTION PARENTERAL at 11:03

## 2019-03-21 RX ADMIN — KETOROLAC TROMETHAMINE 30 MG: 30 INJECTION, SOLUTION INTRAMUSCULAR; INTRAVENOUS at 11:03

## 2019-03-21 RX ADMIN — MUPIROCIN 1 G: 20 OINTMENT TOPICAL at 09:03

## 2019-03-21 RX ADMIN — OXYCODONE HYDROCHLORIDE AND ACETAMINOPHEN 1 TABLET: 10; 325 TABLET ORAL at 11:03

## 2019-03-21 RX ADMIN — OXYCODONE HYDROCHLORIDE AND ACETAMINOPHEN 1 TABLET: 10; 325 TABLET ORAL at 10:03

## 2019-03-21 RX ADMIN — INSULIN ASPART 4 UNITS: 100 INJECTION, SOLUTION INTRAVENOUS; SUBCUTANEOUS at 12:03

## 2019-03-21 RX ADMIN — INSULIN ASPART 2 UNITS: 100 INJECTION, SOLUTION INTRAVENOUS; SUBCUTANEOUS at 04:03

## 2019-03-21 RX ADMIN — SODIUM CHLORIDE, SODIUM LACTATE, POTASSIUM CHLORIDE, AND CALCIUM CHLORIDE: .6; .31; .03; .02 INJECTION, SOLUTION INTRAVENOUS at 06:03

## 2019-03-21 RX ADMIN — INSULIN DETEMIR 20 UNITS: 100 INJECTION, SOLUTION SUBCUTANEOUS at 09:03

## 2019-03-21 RX ADMIN — SIMETHICONE CHEW TAB 80 MG 80 MG: 80 TABLET ORAL at 06:03

## 2019-03-21 RX ADMIN — KETOROLAC TROMETHAMINE 30 MG: 30 INJECTION, SOLUTION INTRAMUSCULAR; INTRAVENOUS at 06:03

## 2019-03-21 RX ADMIN — IBUPROFEN 800 MG: 400 TABLET, FILM COATED ORAL at 11:03

## 2019-03-21 NOTE — PLAN OF CARE
Problem: Adult Inpatient Plan of Care  Goal: Patient-Specific Goal (Individualization)  Outcome: Ongoing (interventions implemented as appropriate)  Pt. tolerating pain with scheduled toradol and prn percocet. Tolerating diet.  Enc. Pt. To ambulate today in hallway to help assist with passing flatus. I&o today. poc reviewed with pt. Bonding well with infant. Vss. Attempting to breastfeed and pumping ad leon, lactation assistance as needed. See mar for scheduled medication.

## 2019-03-21 NOTE — LACTATION NOTE
This note was copied from a baby's chart.   Tried to awaken baby, but sleepy at this time. Mother just started to have some pain control. Feeling a little better than half an hour ago. Mother wants to supplement baby for this feed and try to breast feed baby for morning feed. Blood sugar 77.  0230) Baby supplemented with 15 ml Similac formula via syringe. Will put bay to breast again for 0600 feed. Baby still does not display good strong suck, but swallows without difficulty when fed with syringe. Will check blood sugar prior to 0600 feeding.

## 2019-03-21 NOTE — TRANSFER OF CARE
Anesthesia Transfer of Care Note    Patient: Jay Schmid    Procedure(s) Performed: Procedure(s) (LRB):   SECTION (N/A)    Patient location: Labor and Delivery    Anesthesia Type: spinal    Transport from OR: Transported from OR on room air with adequate spontaneous ventilation    Post pain: adequate analgesia    Post assessment: no apparent anesthetic complications    Post vital signs: stable    Level of consciousness: awake, alert and oriented    Nausea/Vomiting: no nausea/vomiting    Complications: none    Transfer of care protocol was followed      Last vitals:   Visit Vitals  /76   Pulse 68   Temp 36.7 °C (98.1 °F)   Resp 16   Ht 5' (1.524 m)   Wt 86.2 kg (190 lb)   LMP 2018   SpO2 97%   Breastfeeding? No   BMI 37.11 kg/m²

## 2019-03-21 NOTE — PROGRESS NOTES
[facility]  Obstetrics  Postpartum Progress Note    Patient Name: Jay Schmid  MRN: [id number]  Admission Date: 3/20/2019  Hospital Length of Stay: 1 days  Attending Physician: Rudy Case MD  Primary Care Provider: Primary Doctor No    Subjective:     Principal Problem:Status post  section    Hospital course: AccuChek on admit was 170.  It went down to 60 with patient symptomatic.  Apple juice given.  Repeat low transverse  section with tubal ligation.  [place] type  Surgeon: MD Manpreet  Spinal by Rj Quarles MD  EBL: 500 cc  Viable male infant at 2132 3/20/2019  Weight is 2.73 kg (6 lb 0.3 oz)  Apgar: 9/9  Normal tubes and ovaries bilaterally  Placenta: manually extracted intact with three vessels  Tubal performed using 2.0 plain gut.  Specimen: bilateral segments of tubes to Path  No complication    Rudy Case MD    Interval History:   Status post repeat low transverse  section with tubal ligation on 3/20/2019.  AccuChek was 67 at 0200.  Was at 64 preop.  No insulin given.  AccuChek this morning at 0730 at 134.    She is doing well this morning. She is tolerating a diet without nausea or vomiting. She is not voiding spontaneously; still with urinary catheter in place. She is not ambulating. She has passed flatus, and has not a BM. Vaginal bleeding is mild. She denies fever or chills. Abdominal pain is moderate and controlled with oral medications. She is breastfeeding. She desires circumcision for her male baby: yes.    Objective:     Vital Signs (Most Recent):  Temp: 96.2 °F (35.7 °C) (19 0709)  Pulse: 63 (19 0709)  Resp: 16 (19 0709)  BP: 121/77 (19 0709)  SpO2: 100 % (19 0709) Vital Signs (24h Range):  Temp:  [96.2 °F (35.7 °C)-98.6 °F (37 °C)] 96.2 °F (35.7 °C)  Pulse:  [58-93] 63  Resp:  [16-18] 16  SpO2:  [96 %-100 %] 100 %  BP: (121-155)/(56-81) 121/77     Weight: 86.2 kg (190 lb)  Body mass index is 37.11 kg/m².      Intake/Output  Summary (Last 24 hours) at 3/21/2019 0735  Last data filed at 3/21/2019 0624  Gross per 24 hour   Intake 4170 ml   Output 1680 ml   Net 2490 ml       Significant Labs:  Lab Results   Component Value Date    GROUPTRH A POS 2019    HEPBSAG Negative 2018    STREPBCULT  2019     STREPTOCOCCUS AGALACTIAE (GROUP B)  In case of Penicillin allergy, call lab for further testing.  Beta-hemolytic streptococci are routinely susceptible to   penicillins,cephalosporins and carbapenems.       Recent Labs   Lab 19  0630   HGB 12.8   HCT 37.7       CBC:   Recent Labs   Lab 19  0630   WBC 14.20*   RBC 4.84   HGB 12.8   HCT 37.7      MCV 78*   MCH 26.4*   MCHC 34.0     I have personallly reviewed all pertinent lab results from the last 24 hours.    Physical Exam:   Constitutional: She appears well-developed and well-nourished. No distress.    HENT:   Head: Normocephalic and atraumatic.    Eyes: EOM are normal.    Neck: Normal range of motion.    Cardiovascular: Normal rate.     Pulmonary/Chest: Effort normal. No respiratory distress.        Abdominal: Soft. She exhibits no distension. There is no tenderness. There is no rebound and no guarding.   Pfannenstiel incision in AquaCel dressing.  No evidence of active bleeding.  Pressure dressing left in place.       Genitourinary:   Genitourinary Comments: Minimal lochia           Musculoskeletal: Normal range of motion.       Neurological: She is alert.    Skin: Skin is warm and dry.    Psychiatric: She has a normal mood and affect.       Assessment/Plan:     23 y.o. female  for:    * Status post  section    Routine postpartum care  Watch vaginal bleeding  Pain control  Lactation assistance         Type 1 diabetes mellitus without complication    AccuChek at 170 on admiAccuChek at 134 this morning.  Will start diabetic diet.    Will cut her insulin dosage in half.  She will get 10 u of Lispro for meal and 20 units of glargine at  bedtime.ssion     Hypoglycemia associated with diabetes    AccuChek at 134 this morning.  Will start diabetic diet.    Will cut her insulin dosage in half.  She will get 10 u of Lispro for meal and 20 units of glargine at bedtime.         Disposition: As patient meets milestones, will plan to discharge home.    Rudy Case MD  Obstetrics  [facility]

## 2019-03-21 NOTE — PLAN OF CARE
Problem: Adult Inpatient Plan of Care  Goal: Patient-Specific Goal (Individualization)  Outcome: Ongoing (interventions implemented as appropriate)  VSS. Afebrile. Good pain control on Dilaudid PCA pump. LR to RH hand infusing at 125ml/hr. Flores cath draining CYU, SCDs on bilaterally. Positive bowel sounds. Sleepy infant at breast and unable to achieve latch. Patient instructed on pumping for breast stimulation. Bonding appropriately with infant. RH IV patent. POC discussed and understanding verbalized. CARLOS

## 2019-03-21 NOTE — NURSING
1850 - Report received of  at 36 weeks 0 days admitted for c/s for elevated b/p from REFUGIO Olmedo. Care assumed. Full assessment done, history and medications reviewed with pt. Pt and family updated on plan of care with questions answered. Bed in low locked position, call bell in reach.     - upon assessment, pt states she feels clammy and hot. Request blood sugar check.     - CBG 64. Pt assisted to restroom.     - Dr. Case notified of pt complaint and CBG reading. MD wants to give apple juice if okay with anesthesia.      - Anesthesia called with MD request. Per Dr. Quarles pt may have 1 apple juice.     - Transfer to OR 1 for repeat c/s.     - Delivery of viable male infant, APGARS 9/9.     -  Transfer back to L&D 232 for recovery.     - Recovery started.    102 - Report given to REFUGIO Kirby.     120 - Pt transferred to room 209 via stretcher, infant in crib at side. Pt and family oriented to new room, bed in low locked position, call bell in reach.

## 2019-03-21 NOTE — ASSESSMENT & PLAN NOTE
AccuChek at 134 this morning.  Will start diabetic diet.    Will cut her insulin dosage in half.  She will get 10 u of Lispro for meal and 20 units of glargine at bedtime.

## 2019-03-21 NOTE — LACTATION NOTE
This note was copied from a baby's chart.  Mother put baby to breast, but baby not interested and not trying to suck.Jaws stimulated, but baby still won't latch. Mother does not want to breast feed at this time. Not able to express any colostrum at present. Will try again at next feeding. Baby supplemented with 22 ml of Similac formula, since Mother Type I diabetic. Will check blood sugar post feed.  Baby swallowed  without difficulty, but did not display a strong sucking motion. Will continue to monitor.Reviewed the risks of supplementation.  Discussed the adequacy of colostrum.  Instructed on normal  feeding and sleeping patterns.  Encouraged mother to feed the infant on cue, a minimum of 8 times in 24 hours prior to supplementation to promote appropriate breast stimulation for adequate milk supply.  Discussed preferred alternative feeding methods, such as supplementing the infant via breast with SNS, syringe feeding, cup feeding, spoon feeding and finger feeding.  Discussed risks and encouraged to avoid artificial bottles and nipples.  Chooses to supplement via syringe for now.  Safely taught how to feed infant via chosen method.  Demonstrated by nurse and pt return demonstrates proper and safe usage.  States understand and provided appropriate recall of all information.

## 2019-03-21 NOTE — LACTATION NOTE
03/21/19 0800   Maternal Information   Maternal Reason for Referral breastfeeding currently   Maternal Assessment   Breast Size Issue none   Breast Shape Bilateral:;round   Breast Density Bilateral:;soft   Areola Bilateral:;elastic   Nipples Bilateral:;flat   Maternal Infant Feeding   Maternal Preparation breast care;hand hygiene   Maternal Emotional State assist needed;relaxed   Infant Positioning clutch/football   Pain with Feeding no   Latch Assistance yes   Equipment Type   Breast Pump Type double electric, hospital grade   Breast Pump Flange Type hard   Breast Pump Flange Size 27 mm   Breast Pumping   Breast Pumping Interventions frequent pumping encouraged;post-feed pumping encouraged   Breast Pumping double electric breast pump utilized   Assisted mother to attempt to latch baby onto right breast in football position with nipple shield. Baby very sleepy. Baby latched briefly and sucked a few times and then came off of breast. Fed baby 15 ml formula via syringe. Mother pumped 2 ml colostrum after feeding. Reviewed basic breastfeeding and pumping instructions. Mother verbalized understanding with good recall. Will continue to assist mother with breastfeeding as needed.

## 2019-03-21 NOTE — PROGRESS NOTES
Infant S2S. Unable to latch without or with nipple shield. Baby supplemented with 15 ml Similac and mother instructed on pumping to provide breast stimulation.     Simple Starhony pump, tubing, collections containers and labels brought to bedside.  Discussed proper pump setting of initiation phase.  Instructed on proper usage of pump and to adjust suction according to maximum comfort level.  Verified appropriate flange fit.  Educated on the frequency and duration of pumping in order to promote and maintain a full milk supply.  Hands on pumping technique reviewed.  Encouraged hand expression after pumping.  Instructed on cleaning of breast pump parts.  Written instructions also given.  Pt verbalized understanding and appropriate recall for proper milk handling, collection, labeling, storage and transportation.

## 2019-03-21 NOTE — L&D DELIVERY NOTE
[facility]   Section   Operative Note    SUMMARY     Date of Procedure: 3/20/2019        PREOPERATIVE DIAGNOSES:  1.  Intrauterine pregnancy at 36 weeks.  2.  Previous  section.  3.  Poorly-controlled diabetes mellitus  4.  Pregnancy-Induced Hypertension  5.  Undesired fertility     POSTOPERATIVE DIAGNOSES:  1.  Intrauterine pregnancy at 36 weeks.  2.  Previous  section.  3.  Poorly-controlled diabetes mellitus  4.  Pregnancy-Induced Hypertension  5.  Undesired fertility  6.  Carol Ann-tubal adhesions     PROCEDURE:  Repeat low transverse  section with [place] bilateral tubal ligation.     SURGEON:  Rudy Case M.D.     ANESTHESIA:  Spinal by Rj Quarles MD     BLOOD LOSS:  About 500 mL.     URINE:  Clear.     FINDINGS:  Viable male infant delivered from vertex position at 2132 on 3/20/2019.  Weight is pending.  Apgar was 9 at 1 minute and 9 at 5 minutes.     OTHER FINDINGS:  Include normal tubes and ovaries bilaterally except for carol ann-tubal adhesions on both tubes.     COMPLICATIONS:  None.     SPECIMEN:  None.     HISTORY:  The patient is a 23year-old  at 36 weeks consistent with early ultrasound.  Long history of diabetes mellitus.  Poorly-controlled on insulin.  Has been followed by MFM  Has been getting NST twice a week.  Came in today, 3/20/2019 to see Dr Lanza.  Had headache and blurry vision earlier today.  BP 140s/80s  Per Dr Lanza, patient should be delivered today.  Risks and benefits of repeat  section with tubal ligation discussed.     PROCEDURE IN DETAIL:  After confirming appropriate consent was signed in chart, the patient was then transported to the OR.  Spinal anesthesia per Anesthesia.  The patient was then put in a supine position, prepped and draped.  Adequate anesthesia was verified with negative Allis test to the umbilicus.  A Pfannenstiel skin incision was then made with the scalpel, extended down to the fascia.  Fascia was then  entered sharply and extended bilaterally sharply.  Peritoneum was then identified and entered bluntly and sharply.  The lower uterine segment was then identified.  Hysterotomy was performed using the scalpel transversely and amniotomy performed bluntly without any difficulty.  Clear fluid noted.  The uterine incision was then extended bluntly using the surgeon's finger.  A viable male infant delivered from vertex position without any difficulty, suctioned on the field and handed off to the nurse practitioner in attendance.  Birth time was 2132. on 3/20/2019.  Weight is pending.  Apgar is 9 at 1 minute and 9 at 5 minutes.  Cord blood was then obtained.  Placenta was then manually extracted intact.  Uterus was then delivered into the abdominal incision.  Endometrial cavity was then wiped clean with wet laps.  Uterine incision was then repaired in 2 layers using #0 Monocryl in a running interlocking fashion with the second layer in an imbricating manner.  Good hemostasis was noted.  Again, normal tubes and ovaries bilaterally.    After confirming again that the patient still would like her tubal ligation, we proceeded with the procedure. The right tube was identified with its fimbriated end.  Ambler clamp placed at the junction of the proximal and middle third of the tube, twisted, then 2.0 plain gut was used to tie twice without any difficulty. A segment of tube was excised using Metzenbaum scissors, making sure it was transected completely.  The same procedure was repeated on the left side, again without any difficulty.  Good hemostasis noted.     Uterus was then replaced back to the abdomen.  Good hemostasis was noted.  Peritoneum was then closed using #3-0 Vicryl, fascia was then closed using #1 Vicryl in a running nonlocking fashion.  Subcutaneous tissue was then noted to be in good hemostasis.  It was then reapproximated using #3-0 Vicryl and then the skin was then reapproximated using #3-0 Vicryl on a Wilder  needle and a pressure bandage applied with the Aquacel dressing.  The patient was then transferred to the Recovery Room in stable condition.           Specimens:   Specimen (12h ago, onward)    None          Condition: Good    Disposition: PACU - hemodynamically stable.    Attestation: Good         Delivery Information for  Ori Schmid    Birth information:  YOB: 2020   Time of birth: 9:32 PM   Sex: male   Head Delivery Date/Time: 3/20/2019  9:32 PM   Delivery type: , Low Transverse   Gestational Age: 36w0d    Delivery Providers    Delivering clinician:  Rudy Case MD   Provider Role    Victoria Barba, REFUGIO Circulator    Rj Quarles MD Anesthesiologist    Demi Chung, CRNA Nurse Anesthetist    Kecia Shipley RN Registered Nurse    Ngozi Sheehan, NP Nurse Practitioner    Melissa Chou, [facility] Student            Measurements    Weight:    Length:           Apgars    Living status:  Living  Apgars:   1 min.:   5 min.:   10 min.:   15 min.:   20 min.:     Skin color:   1  1       Heart rate:   2  2       Reflex irritability:   2  2       Muscle tone:   2  2       Respiratory effort:   2  2       Total:   9  9       Apgars assigned by:  JELANI POWER         Operative Delivery    Forceps attempted?:  No  Vacuum extractor attempted?:  No         Shoulder Dystocia    Shoulder dystocia present?:  No           Presentation    Presentation:  Vertex  Position:  Occiput Anterior           Interventions/Resuscitation    Method:  Bulb Suctioning, Tactile Stimulation       Cord    Vessels:  3 vessels  Complications:  None  Delayed Cord Clamping?:  No  Cord Clamped Date/Time:  3/20/2019  9:32 PM  Cord Blood Disposition:  Sent with Baby  Gases Sent?:  No  Stem Cell Collection (by MD):  No       Placenta    Placenta delivery date/time:  3/20/2019 2133  Placenta removal:  Manual removal  Placenta appearance:  Intact  Placenta disposition:   discarded           Labor Events:       labor: Yes     Labor Onset Date/Time:         Dilation Complete Date/Time:         Start Pushing Date/Time:       Rupture Date/Time:              Rupture type:           Fluid Amount:        Fluid Color:        Fluid Odor:        Membrane Status (PeriCalm):        Rupture Date/Time (PeriCalm):        Fluid Amount (PeriCalm):        Fluid Color (PeriCalm):         steroids: None     Antibiotics given for GBS: No     Induction: none;dinoprostone insert     Indications for induction:        Augmentation:       Indications for augmentation: Hypertension     Labor complications: None     Additional complications:          Cervical ripening:                     Delivery:      Episiotomy: None     Indication for Episiotomy:       Perineal Lacerations: None Repaired:      Periurethral Laceration: none Repaired:     Labial Laceration: none Repaired:     Sulcus Laceration: none Repaired:     Vaginal Laceration: No Repaired:     Cervical Laceration: No Repaired:     Repair suture: None     Repair # of packets:       Vaginal delivery QBL (mL): 0      QBL (mL): 480     Combined Blood Loss (mL): 480     Vaginal Sweep Performed: No     Surgicount Correct: Yes       Other providers:       Anesthesia    Method:  Spinal          Details (if applicable):  Trial of Labor No    Categorization: Repeat    Priority: Emergency   Indications for : Repeat Section   Incision Type: low transverse     Additional  information:  Forceps:    Vacuum:    Breech:    Observed anomalies    Other (Comments):

## 2019-03-21 NOTE — SUBJECTIVE & OBJECTIVE
Hospital course: AccuChek on admit was 170.  It went down to 60 with patient symptomatic.  Apple juice given.  Repeat low transverse  section with tubal ligation.  José Manuel type  Surgeon: MD Manpreet  Spinal by Rj Quarles MD  EBL: 500 cc  Viable male infant at 2132 3/20/2019  Weight is 2.73 kg (6 lb 0.3 oz)  Apgar: 9/9  Normal tubes and ovaries bilaterally  Placenta: manually extracted intact with three vessels  Tubal performed using 2.0 plain gut.  Specimen: bilateral segments of tubes to Path  No complication    Rudy Case MD    Interval History:   Status post repeat low transverse  section with tubal ligation on 3/20/2019.  AccuChek was 67 at 0200.  Was at 64 preop.  No insulin given.  AccuChek this morning at 0730 at 134.    She is doing well this morning. She is tolerating a diet without nausea or vomiting. She is not voiding spontaneously; still with urinary catheter in place. She is not ambulating. She has passed flatus, and has not a BM. Vaginal bleeding is mild. She denies fever or chills. Abdominal pain is moderate and controlled with oral medications. She is breastfeeding. She desires circumcision for her male baby: yes.    Objective:     Vital Signs (Most Recent):  Temp: 96.2 °F (35.7 °C) (19 0709)  Pulse: 63 (19 0709)  Resp: 16 (19 0709)  BP: 121/77 (19 0709)  SpO2: 100 % (19 0709) Vital Signs (24h Range):  Temp:  [96.2 °F (35.7 °C)-98.6 °F (37 °C)] 96.2 °F (35.7 °C)  Pulse:  [58-93] 63  Resp:  [16-18] 16  SpO2:  [96 %-100 %] 100 %  BP: (121-155)/(56-81) 121/77     Weight: 86.2 kg (190 lb)  Body mass index is 37.11 kg/m².      Intake/Output Summary (Last 24 hours) at 3/21/2019 0735  Last data filed at 3/21/2019 0624  Gross per 24 hour   Intake 4170 ml   Output 1680 ml   Net 2490 ml       Significant Labs:  Lab Results   Component Value Date    GROUPTRH A POS 2019    HEPBSAG Negative 2018    STREPBCULT  2019     STREPTOCOCCUS AGALACTIAE (GROUP  B)  In case of Penicillin allergy, call lab for further testing.  Beta-hemolytic streptococci are routinely susceptible to   penicillins,cephalosporins and carbapenems.       Recent Labs   Lab 03/21/19  0630   HGB 12.8   HCT 37.7       CBC:   Recent Labs   Lab 03/21/19  0630   WBC 14.20*   RBC 4.84   HGB 12.8   HCT 37.7      MCV 78*   MCH 26.4*   MCHC 34.0     I have personallly reviewed all pertinent lab results from the last 24 hours.    Physical Exam:   Constitutional: She appears well-developed and well-nourished. No distress.    HENT:   Head: Normocephalic and atraumatic.    Eyes: EOM are normal.    Neck: Normal range of motion.    Cardiovascular: Normal rate.     Pulmonary/Chest: Effort normal. No respiratory distress.        Abdominal: Soft. She exhibits no distension. There is no tenderness. There is no rebound and no guarding.   Pfannenstiel incision in AquaCel dressing.  No evidence of active bleeding.  Pressure dressing left in place.       Genitourinary:   Genitourinary Comments: Minimal lochia           Musculoskeletal: Normal range of motion.       Neurological: She is alert.    Skin: Skin is warm and dry.    Psychiatric: She has a normal mood and affect.

## 2019-03-21 NOTE — ANESTHESIA PROCEDURE NOTES
Spinal    Diagnosis: IUP for repeat  and tubal  Patient location during procedure: OR  Start time: 3/20/2019 8:53 PM  Timeout: 3/20/2019 8:52 PM  End time: 3/20/2019 9:06 PM  Staffing  Anesthesiologist: Rj Quarles MD  Performed: anesthesiologist   Preanesthetic Checklist  Completed: patient identified, pre-op evaluation, timeout performed, IV checked, risks and benefits discussed and monitors and equipment checked  Spinal Block  Patient position: sitting  Prep: ChloraPrep  Patient monitoring: heart rate, continuous pulse ox and frequent blood pressure checks  Approach: midline  Location: L4-5  Injection technique: single shot  CSF Fluid: clear free-flowing CSF  Needle  Needle gauge: 25 G  Needle length: 5 in  Additional Documentation: negative aspiration for heme, no paresthesia on injection and incremental injection  Needle localization: anatomical landmarks  Assessment  Sensory level: T4   Dermatomal levels determined by pinch or prick  Ease of block: moderate  Patient's tolerance of the procedure: comfortable throughout block and no complaints  Additional Notes  Attempted straight spinal at L3-L4 level, but unable to find intrathecal space.  2nd attempt, went at L4-L5 level, used 17g Touhy needle, 3.5in, able to access epidural space at 6cm, no dural puncture or CSF noted from the Touhy needle.  Passed Celeste-matilde needle (25g, 5in), and was able to obtain clear flowing CSF.  Bupiv 0.75-1.4ml and Fent 10mcg given intrathecally. Pt tolerated procedure well.

## 2019-03-21 NOTE — HOSPITAL COURSE
AccuChek on admit was 170.  It went down to 60 with patient symptomatic.  Apple juice given.  Repeat low transverse  section with tubal ligation.  José Manuel type  Surgeon: MD Manpreet  Spinal by Rj Quarles MD  EBL: 500 cc  Viable male infant at 2132 3/20/2019  Weight is 2.73 kg (6 lb 0.3 oz)  Apgar: 9/9  Normal tubes and ovaries bilaterally  Placenta: manually extracted intact with three vessels  Tubal performed using 2.0 plain gut.  Specimen: bilateral segments of tubes to Path  No complication    Postpartum course was benign.  She is breast-feeding well.  Exam was benign with patient afebrile, vitals stable, and minimal bleeding.  Normal activities.  Patient discharged home on postpartum day #2 per request   Discharge medications include Percocet, Motrin, prenatal vitamins, and iron supplement with insulin.    Insulin dosage.  20 units of basalar insulin at bedtime and 12 units of regular insulin with each meal.  Follow-up with me in 2 weeks.    Rudy Case MD.

## 2019-03-21 NOTE — ASSESSMENT & PLAN NOTE
AccuChek at 170 on admiAccuChek at 134 this morning.  Will start diabetic diet.    Will cut her insulin dosage in half.  She will get 10 u of Lispro for meal and 20 units of glargine at bedtime.ssion

## 2019-03-22 VITALS
HEART RATE: 69 BPM | DIASTOLIC BLOOD PRESSURE: 68 MMHG | SYSTOLIC BLOOD PRESSURE: 120 MMHG | WEIGHT: 190 LBS | OXYGEN SATURATION: 100 % | HEIGHT: 60 IN | TEMPERATURE: 97 F | BODY MASS INDEX: 37.3 KG/M2 | RESPIRATION RATE: 16 BRPM

## 2019-03-22 LAB
POCT GLUCOSE: 170 MG/DL (ref 70–110)
POCT GLUCOSE: 171 MG/DL (ref 70–110)

## 2019-03-22 PROCEDURE — 25000003 PHARM REV CODE 250: Performed by: OBSTETRICS & GYNECOLOGY

## 2019-03-22 PROCEDURE — 99238 PR HOSPITAL DISCHARGE DAY,<30 MIN: ICD-10-PCS | Mod: ,,, | Performed by: OBSTETRICS & GYNECOLOGY

## 2019-03-22 PROCEDURE — 99238 HOSP IP/OBS DSCHRG MGMT 30/<: CPT | Mod: ,,, | Performed by: OBSTETRICS & GYNECOLOGY

## 2019-03-22 RX ORDER — IBUPROFEN 600 MG/1
600 TABLET ORAL EVERY 6 HOURS PRN
Qty: 40 TABLET | Refills: 1 | Status: SHIPPED | OUTPATIENT
Start: 2019-03-22 | End: 2019-04-27

## 2019-03-22 RX ORDER — OXYCODONE AND ACETAMINOPHEN 5; 325 MG/1; MG/1
1 TABLET ORAL EVERY 4 HOURS PRN
Qty: 20 TABLET | Refills: 0 | Status: SHIPPED | OUTPATIENT
Start: 2019-03-22 | End: 2019-10-07 | Stop reason: CLARIF

## 2019-03-22 RX ADMIN — INSULIN ASPART 2 UNITS: 100 INJECTION, SOLUTION INTRAVENOUS; SUBCUTANEOUS at 01:03

## 2019-03-22 RX ADMIN — INSULIN HUMAN 10 UNITS: 100 INJECTION, SOLUTION PARENTERAL at 07:03

## 2019-03-22 RX ADMIN — INSULIN HUMAN 10 UNITS: 100 INJECTION, SOLUTION PARENTERAL at 01:03

## 2019-03-22 RX ADMIN — INSULIN ASPART 2 UNITS: 100 INJECTION, SOLUTION INTRAVENOUS; SUBCUTANEOUS at 07:03

## 2019-03-22 RX ADMIN — OXYCODONE HYDROCHLORIDE AND ACETAMINOPHEN 1 TABLET: 10; 325 TABLET ORAL at 01:03

## 2019-03-22 RX ADMIN — IBUPROFEN 800 MG: 400 TABLET, FILM COATED ORAL at 01:03

## 2019-03-22 RX ADMIN — OXYCODONE HYDROCHLORIDE AND ACETAMINOPHEN 1 TABLET: 10; 325 TABLET ORAL at 05:03

## 2019-03-22 RX ADMIN — IBUPROFEN 800 MG: 400 TABLET, FILM COATED ORAL at 05:03

## 2019-03-22 RX ADMIN — SIMETHICONE CHEW TAB 80 MG 80 MG: 80 TABLET ORAL at 12:03

## 2019-03-22 NOTE — PROGRESS NOTES
[facility]  Obstetrics  Postpartum Progress Note    Patient Name: Jay Schmid  MRN: [id number]  Admission Date: 3/20/2019  Hospital Length of Stay: 2 days  Attending Physician: Rudy Case MD  Primary Care Provider: Primary Doctor No    Subjective:     Principal Problem:Status post  section    Hospital course: AccuChek on admit was 170.  It went down to 60 with patient symptomatic.  Apple juice given.  Repeat low transverse  section with tubal ligation.  [place] type  Surgeon: MD Manpreet  Spinal by Rj Quarles MD  EBL: 500 cc  Viable male infant at 2132 3/20/2019  Weight is 2.73 kg (6 lb 0.3 oz)  Apgar: 9/9  Normal tubes and ovaries bilaterally  Placenta: manually extracted intact with three vessels  Tubal performed using 2.0 plain gut.  Specimen: bilateral segments of tubes to Path  No complication    Rudy Case MD    Interval History:   Status post repeat  section with tubal ligation on 3/20/2019    She is doing well this morning. She is tolerating a regular diet without nausea or vomiting. She is voiding spontaneously. She is ambulating. She has passed flatus, and has not a BM. Vaginal bleeding is mild. She denies fever or chills. Abdominal pain is mild and controlled with oral medications. She is breastfeeding. She desires circumcision for her male baby: yes.    Objective:     Vital Signs (Most Recent):  Temp: 96.3 °F (35.7 °C) (19)  Pulse: 72 (19)  Resp: 18 (19)  BP: 131/80 (19)  SpO2: 98 % (19) Vital Signs (24h Range):  Temp:  [96 °F (35.6 °C)-99 °F (37.2 °C)] 96.3 °F (35.7 °C)  Pulse:  [64-87] 72  Resp:  [16-20] 18  SpO2:  [98 %] 98 %  BP: (117-144)/(73-86) 131/80     Weight: 86.2 kg (190 lb)  Body mass index is 37.11 kg/m².      Intake/Output Summary (Last 24 hours) at 3/22/2019 0723  Last data filed at 3/22/2019 0600  Gross per 24 hour   Intake 2695.83 ml   Output 4800 ml   Net -2104.17 ml       Significant  Labs:  Lab Results   Component Value Date    GROUPTRH A POS 2019    HEPBSAG Negative 2018    STREPBCULT  2019     STREPTOCOCCUS AGALACTIAE (GROUP B)  In case of Penicillin allergy, call lab for further testing.  Beta-hemolytic streptococci are routinely susceptible to   penicillins,cephalosporins and carbapenems.       Recent Labs   Lab 19  0630   HGB 12.8   HCT 37.7       CBC:   Recent Labs   Lab 19  0630   WBC 14.20*   RBC 4.84   HGB 12.8   HCT 37.7      MCV 78*   MCH 26.4*   MCHC 34.0     I have personallly reviewed all pertinent lab results from the last 24 hours.    Physical Exam:   Constitutional: She appears well-developed and well-nourished. No distress.    HENT:   Head: Normocephalic and atraumatic.    Eyes: EOM are normal.    Neck: Normal range of motion.    Cardiovascular: Normal rate.     Pulmonary/Chest: Effort normal. No respiratory distress.        Abdominal: Soft. She exhibits distension. There is no tenderness. There is no rebound and no guarding.   Pfannenstiel incision in AquaCel dressing.  No evidence of active bleeding.  Dressing removed.  Incision is clean, dry, intact.  Healing well without any evidence of infection.               Musculoskeletal: Normal range of motion.       Neurological: She is alert.    Skin: Skin is warm and dry.    Psychiatric: She has a normal mood and affect.       Assessment/Plan:     23 y.o. female  for:    * Status post  section    Routine postpartum care  Watch vaginal bleeding  Pain control  Lactation assistance    Circumcision of male infant to be done now.    Possible discharge home later today per request         Type 1 diabetes mellitus without complication    Doing better at half of her pregnancy insulin dosage     Hypoglycemia associated with diabetes    Doing better on current insulin regimen at half of her pregnancy dosage.         Disposition: As patient meets milestones, will plan to discharge  home.    Rudy Case MD  Obstetrics  [facility]

## 2019-03-22 NOTE — LACTATION NOTE
Review breastfeeding discharge information with mother -aware of need to monitor wet and dirty diapers over next few days -referred to breastfeeding guide for community resources and all questions answered -using nipple shield for latch and pumping for extra stimulation-has breast shells for nipple eversion - plan to wean from shield as able -has personal pump for use at home and states understanding of all information

## 2019-03-22 NOTE — DISCHARGE SUMMARY
[facility]  Obstetrics  Discharge Summary      Patient Name: Jay Schmid  MRN: [id number]  Admission Date: 3/20/2019  Hospital Length of Stay: 2 days  Discharge Date and Time: 3/22/2019  Attending Physician: Gilberto Case MD   Discharging Provider: Gilberto Case MD  Primary Care Provider: Primary Doctor No    HPI: 24 yo  at 36w  Long history of diabetes mellitus.  Poorly-controlled on insulin.  Has been followed by M  Has been getting NST twice a week.  Came in today to see Dr Lanza.  Had headache and blurry vision earlier today.  BP 140s/80s  Per Dr Lanza, patient should be delivered today.    She ate at 1330 today, 3/20/2019  No other complaint    Procedure(s) (LRB):   SECTION (N/A)     Hospital Course:   AccuChek on admit was 170.  It went down to 60 with patient symptomatic.  Apple juice given.  Repeat low transverse  section with tubal ligation.  [place] type  Surgeon: MD Manpreet  Spinal by Rj Quarles MD  EBL: 500 cc  Viable male infant at 2132 3/20/2019  Weight is 2.73 kg (6 lb 0.3 oz)  Apgar: 9/9  Normal tubes and ovaries bilaterally  Placenta: manually extracted intact with three vessels  Tubal performed using 2.0 plain gut.  Specimen: bilateral segments of tubes to Path  No complication    Postpartum course was benign.  She is breast-feeding well.  Exam was benign with patient afebrile, vitals stable, and minimal bleeding.  Normal activities.  Patient discharged home on postpartum day #2 per request   Discharge medications include Percocet, Motrin, prenatal vitamins, and iron supplement with insulin.    Insulin dosage.  20 units of basalar insulin at bedtime and 12 units of regular insulin with each meal.  Follow-up with me in 2 weeks.    Gilberto Case MD.    Consults (From admission, onward)        Status Ordering Provider     Inpatient consult to Anesthesiology  Once     Provider:  Rj Quarles MD    Acknowledged GILBERTO CASE.          Final Active Diagnoses:     Diagnosis Date Noted POA    PRINCIPAL PROBLEM:  Status post  section [Z98.891] 10/17/2014 Not Applicable    Type 1 diabetes mellitus without complication [E10.9]  Yes    Hypoglycemia associated with diabetes [E11.649] 2014 Yes      Problems Resolved During this Admission:    Diagnosis Date Noted Date Resolved POA    36 weeks gestation of pregnancy [Z3A.36] 2019 Not Applicable    Pregnancy induced hypertension, third trimester [O13.3] 2019 Yes    Status post  delivery [Z98.891] 2019 Not Applicable        Labs:   CMP   Recent Labs   Lab 19  1644   *   K 4.0      CO2 21*   *   BUN 5*   CREATININE 0.8   CALCIUM 9.2   PROT 6.4   ALBUMIN 2.4*   BILITOT 0.2   ALKPHOS 157*   AST 15   ALT 12   ANIONGAP 7*   ESTGFRAFRICA >60   EGFRNONAA >60   , CBC   Recent Labs   Lab 19  1644 19  0630   WBC 11.27 14.20*   HGB 13.0 12.8   HCT 38.5 37.7    274    and All labs within the past 24 hours have been reviewed    Feeding Method: breast    Immunizations     Date Immunization Status Dose Route/Site Given by    19 MMR Incomplete 0.5 mL Subcutaneous/Left deltoid     19 Tdap Incomplete 0.5 mL Intramuscular/Left deltoid           Delivery:    Episiotomy: None   Lacerations: None   Repair suture: None   Repair # of packets:     Blood loss (ml): 0     Birth information:  YOB: 2020   Time of birth: 9:32 PM   Sex: male   Delivery type: , Low Transverse   Gestational Age: 36w0d    Delivery Clinician:      Other providers:       Additional  information:  Forceps:    Vacuum:    Breech:    Observed anomalies      Living?:           APGARS  One minute Five minutes Ten minutes   Skin color:         Heart rate:         Grimace:         Muscle tone:         Breathing:         Totals: 9  9        Placenta: Delivered:       appearance    Pending Diagnostic Studies:     None     "      Discharged Condition: good    Disposition: Home or Self Care    Follow Up:  Follow-up Information     Rudy Case MD In 2 weeks.    Specialties:  Obstetrics and Gynecology, Obstetrics and Gynecology  Contact information:  [address]  [address]  [place] [id number] [phone]                 Patient Instructions:      Call MD for:  temperature >100.4     Call MD for:  persistent nausea and vomiting or diarrhea     Call MD for:  severe uncontrolled pain     Call MD for:  redness, tenderness, or signs of infection (pain, swelling, redness, odor or green/yellow discharge around incision site)     Call MD for:  difficulty breathing or increased cough     Call MD for:  severe persistent headache     Call MD for:  worsening rash     Call MD for:  persistent dizziness, light-headedness, or visual disturbances     Call MD for:  increased confusion or weakness     No dressing needed     Medications:  Current Discharge Medication List      START taking these medications    Details   ibuprofen (ADVIL,MOTRIN) 600 MG tablet Take 1 tablet (600 mg total) by mouth every 6 (six) hours as needed.  Qty: 40 tablet, Refills: 1      insulin detemir U-100 (LEVEMIR FLEXTOUCH) 100 unit/mL (3 mL) SubQ InPn pen Inject 20 Units into the skin every evening.  Qty: 3 mL, Refills: 3      insulin regular 100 unit/mL Inj injection Inject 10 Units into the skin 3 (three) times daily.  Qty: 10 mL, Refills: 3      oxyCODONE-acetaminophen (PERCOCET) 5-325 mg per tablet Take 1 tablet by mouth every 4 (four) hours as needed.  Qty: 20 tablet, Refills: 0         CONTINUE these medications which have NOT CHANGED    Details   insulin lispro 100 unit/mL injection 20 units with each meal  Qty: 10 mL, Refills: 3    Associated Diagnoses: Diabetes mellitus affecting pregnancy in second trimester; Complication of pregnancy in second trimester      !! BD ULTRA-FINE GABRIELE PEN NEEDLE 32 gauge x 5/32" Ndle USE TO INJECT QID  Refills: 6      blood glucose " "strip-disp meter Kit Preferred by insurance, use as directed.  Qty: 1 kit, Refills: 0      blood sugar diagnostic Strp To check BG 6x times daily, to use with insurance preferred meter  Qty: 200 strip, Refills: 11    Associated Diagnoses: Uncontrolled type 1 diabetes mellitus without complication      !! pen needle, diabetic (BD ULTRA-FINE GABRIELE PEN NEEDLE) 32 gauge x 5/32" Ndle USE TO INJECT FOUR TIMES DAILY  Qty: 100 each, Refills: 11    Associated Diagnoses: Type 1 diabetes mellitus with hyperglycemia       !! - Potential duplicate medications found. Please discuss with provider.      STOP taking these medications       insulin (BASAGLAR KWIKPEN U-100 INSULIN) glargine 100 units/mL (3mL) SubQ pen Comments:   Reason for Stopping:         insulin syringe-needle U-100 (BD INSULIN SYRINGE) 0.3 mL 29 gauge x 1/2" Syrg Comments:   Reason for Stopping:         lancets Misc Comments:   Reason for Stopping:         promethazine (PHENERGAN) 25 MG tablet Comments:   Reason for Stopping:         VITAMIN B-6 25 MG tablet Comments:   Reason for Stopping:               Rudy Case MD  Obstetrics  [facility]  "

## 2019-03-22 NOTE — ASSESSMENT & PLAN NOTE
Routine postpartum care  Watch vaginal bleeding  Pain control  Lactation assistance    Circumcision of male infant to be done now.    Patient discharged home on postpartum day #2 per request   Discharge medications include Percocet, Motrin, prenatal vitamins, and iron supplement with insulin.    Insulin dosage.  20 units of basalar insulin at bedtime and 12 units of regular insulin with each meal.  Follow-up with me in 2 weeks.

## 2019-03-22 NOTE — DISCHARGE INSTRUCTIONS
Breastfeeding discharge instructions given with First Alert form and reviewed.  Also discussed:   AAP recommendation of exclusive breastfeeding for the first 6 months of life and continued breastfeeding with the introduction of supplemental foods beyond the first year of life.  Instructed on the recommendation to delay all bottle and pacifier use until after 4 weeks of age and breastfeeding is well established.  Discussed the benefits of exclusive breastfeeding for both mother and baby.  Discussed the risks of supplementation/pacifier use on the exclusivity of breastfeeding in the first 6 months. Feed the baby at the earliest sign of hunger or comfort  o Hands to mouth, sucking motions  o Rooting or searching for something to suck on  o Dont wait for crying - it is a not a late sign of hunger; it is a sign of distress     The feedings may be 8-12 times per 24hrs and will not follow a schedule   Alternate the breast you start the feeding with, or start with the breast that feels the fullest   Switch breasts when the baby takes himself off the breast or falls asleep   Keep offering breasts until the baby looks full, no longer gives hunger signs, and stays asleep when placed on his back in the crib   If the baby is sleepy and wont wake for a feeding, put the baby skin-to-skin dressed in a diaper against the mothers bare chest   Sleep near your baby   The baby should be positioned and latched on to the breast correctly  o Chest-to-chest, chin in the breast  o Babys lips are flipped outward  o Babys mouth is stretched open wide like a shout  o Babys sucking should feel like tugging to the mother  - The baby should be drinking at the breast:  o You should hear swallowing or gulping throughout the feeding  o You should see milk on the babys lips when he comes off the breast  o Your breasts should be softer when the baby is finished feeding  o The baby should look relaxed at the end of feedings  o After  the 4th day and your milk is in:  o The babys poop should turn bright yellow and be loose, watery, and seedy  o The baby should have at least 3-4 poops the size of the palm of your hand per day  o The baby should have at least 6-8 wet diapers per day  o The urine should be light yellow in color  You should drink when you are thirsty and eat a healthy diet when you are    hungry.     Take naps to get the rest you need.   Take medications and/or drink alcohol only with permission of your obstetrician    or the babys pediatrician.  You can also call the [facility],   ([phone]), Monday-Friday, 8am-5pm Central time, to get the most   up-to-date evidence-based information on the use of medications during   pregnancy and breastfeeding.      The baby should be examined by a pediatrician at 3-5 days of age; unless ordered sooner by the pediatrician.  Once your milk comes in, the baby should be back to birth weight no later than 10-14 days of age.    After a Cesearean Birth    General Discharge Instructions  · May follow a regular diet, unless otherwise discussed with physician.  · Take showers, not baths unless otherwise discussed with physician.  · Activity as tolerated.  · No lifting or heavy exercise for 6 weeks, no driving for 2 weeks, no sexual intercourse, douching or tampons for 6 weeks  · May return to work/school as discussed with physician  · Discuss birth control with physician  · Breast care support bra worn at all times  · Take your RX as directed  · Lactation consultant referral number ( [phone] or [phone])    Call Your Healthcare Provider Right Away If You Have:  · A temperature of 100.4°F or higher.  · If your blood pressure is over 155/105.  · You have difficulty catching your breath or trouble breathing.  · Heavy vaginal bleeding, clots, or vaginal discharge with foul odor. (heavier than menses)  · Persistent nausea or vomiting.  · You gain more than 3 pounds in 3 days.  · Severe  headaches not relieved by Tylenol (acetaminophen) or Motrin (ibuprofen)  · Blurry or double vision, see spots or flashing lights.  · Dizziness or fainting.  · New onset swelling or worsening of existing swelling.  · Burning or pain when you urinate.  · No bowel movement for 5 days.  · Redness, warmth, swelling, or pain in the lower leg.  · Redness, discharge, or pain worse than you had in the hospital.  · Burning, pain, red streaks, or lumpy areas in your breasts.  · Cracks, blisters, or blood on your nipples.  · Feelings of extreme sadness or anxiety, or a feeling that you dont want to be with your baby.  · If you have any new or unusual symptoms or have questions or concerns    Some of these symptoms can occur up to 4 to 6 weeks after delivery. This can be a sign of pre-eclampsia, which is a serious disease that can cause stroke, seizures, organ damage, or death. Do not wait to call your doctor or seek medical attention.    Incision Care  · If you have an aquacele dressing then it stays on for 1 week.  It is waterproof and will be removed at your postop visit.  If it comes off then call your physician and keep the incision clean with warm soapy water.  · Watch your incision for signs of infection, such as increasing redness or drainage or a foul smelling odor.  · For ease of movement, hold a pillow against the incision when you get up from a lying or sitting position, and when you laugh or cough.  · Avoid heavy lifting--nothing heavier than your baby until your doctor instructs you otherwise.       Follow-Up  Schedule a  follow-up exam with your healthcare provider for about 1 week after delivery. Contact your healthcare provider if you think you are having any problems.

## 2019-03-22 NOTE — PLAN OF CARE
Problem: Breastfeeding  Goal: Effective Breastfeeding  Outcome: Outcome(s) achieved Date Met: 03/22/19  Plan breastfeed on demand at least 8 times in 24 hours and monitor output over next few days

## 2019-03-22 NOTE — ASSESSMENT & PLAN NOTE
Doing better at half of her pregnancy insulin dosage    Patient discharged home on postpartum day #2 per request   Discharge medications include Percocet, Motrin, prenatal vitamins, and iron supplement with insulin.    Insulin dosage.  20 units of basalar insulin at bedtime and 12 units of regular insulin with each meal.  Follow-up with me in 2 weeks.

## 2019-03-22 NOTE — PLAN OF CARE
Problem: Adult Inpatient Plan of Care  Goal: Plan of Care Review  Outcome: Ongoing (interventions implemented as appropriate)  VSS. NAD. Ambulating and voiding. Pain well controlled with prn pain meds. Discussed POC, pain management, and breastfeeding. Pt verbalizes understanding.

## 2019-03-22 NOTE — SUBJECTIVE & OBJECTIVE
Hospital course: AccuChek on admit was 170.  It went down to 60 with patient symptomatic.  Apple juice given.  Repeat low transverse  section with tubal ligation.  José Manuel type  Surgeon: MD Manpreet  Spinal by Rj Quarles MD  EBL: 500 cc  Viable male infant at 2132 3/20/2019  Weight is 2.73 kg (6 lb 0.3 oz)  Apgar: 9/9  Normal tubes and ovaries bilaterally  Placenta: manually extracted intact with three vessels  Tubal performed using 2.0 plain gut.  Specimen: bilateral segments of tubes to Path  No complication    Rudy Case MD    Interval History:   Status post repeat  section with tubal ligation on 3/20/2019    She is doing well this morning. She is tolerating a regular diet without nausea or vomiting. She is voiding spontaneously. She is ambulating. She has passed flatus, and has not a BM. Vaginal bleeding is mild. She denies fever or chills. Abdominal pain is mild and controlled with oral medications. She is breastfeeding. She desires circumcision for her male baby: yes.    Objective:     Vital Signs (Most Recent):  Temp: 96.3 °F (35.7 °C) (19)  Pulse: 72 (19)  Resp: 18 (19)  BP: 131/80 (19)  SpO2: 98 % (19) Vital Signs (24h Range):  Temp:  [96 °F (35.6 °C)-99 °F (37.2 °C)] 96.3 °F (35.7 °C)  Pulse:  [64-87] 72  Resp:  [16-20] 18  SpO2:  [98 %] 98 %  BP: (117-144)/(73-86) 131/80     Weight: 86.2 kg (190 lb)  Body mass index is 37.11 kg/m².      Intake/Output Summary (Last 24 hours) at 3/22/2019 0723  Last data filed at 3/22/2019 0600  Gross per 24 hour   Intake 2695.83 ml   Output 4800 ml   Net -2104.17 ml       Significant Labs:  Lab Results   Component Value Date    GROUPTRH A POS 2019    HEPBSAG Negative 2018    STREPBCULT  2019     STREPTOCOCCUS AGALACTIAE (GROUP B)  In case of Penicillin allergy, call lab for further testing.  Beta-hemolytic streptococci are routinely susceptible to   penicillins,cephalosporins and  carbapenems.       Recent Labs   Lab 03/21/19  0630   HGB 12.8   HCT 37.7       CBC:   Recent Labs   Lab 03/21/19  0630   WBC 14.20*   RBC 4.84   HGB 12.8   HCT 37.7      MCV 78*   MCH 26.4*   MCHC 34.0     I have personallly reviewed all pertinent lab results from the last 24 hours.    Physical Exam:   Constitutional: She appears well-developed and well-nourished. No distress.    HENT:   Head: Normocephalic and atraumatic.    Eyes: EOM are normal.    Neck: Normal range of motion.    Cardiovascular: Normal rate.     Pulmonary/Chest: Effort normal. No respiratory distress.        Abdominal: Soft. She exhibits distension. There is no tenderness. There is no rebound and no guarding.   Pfannenstiel incision in AquaCel dressing.  No evidence of active bleeding.  Dressing removed.  Incision is clean, dry, intact.  Healing well without any evidence of infection.               Musculoskeletal: Normal range of motion.       Neurological: She is alert.    Skin: Skin is warm and dry.    Psychiatric: She has a normal mood and affect.

## 2019-03-22 NOTE — ASSESSMENT & PLAN NOTE
Routine postpartum care  Watch vaginal bleeding  Pain control  Lactation assistance    Circumcision of male infant to be done now.    Possible discharge home later today per request

## 2019-03-23 NOTE — ANESTHESIA POSTPROCEDURE EVALUATION
Anesthesia Post Evaluation    Patient: Jay Schmid    Procedure(s) Performed: Procedure(s) (LRB):   SECTION (N/A)    Final Anesthesia Type: spinal  Patient location during evaluation: labor & delivery  Patient participation: Yes- Able to Participate  Level of consciousness: awake and alert  Post-procedure vital signs: reviewed and stable  Pain management: adequate  Airway patency: patent  PONV status at discharge: No PONV  Anesthetic complications: no      Cardiovascular status: hemodynamically stable  Respiratory status: unassisted and spontaneous ventilation  Hydration status: euvolemic  Follow-up not needed.        Visit Vitals  /68 (BP Location: Left arm, Patient Position: Sitting)   Pulse 69   Temp 36.3 °C (97.4 °F) (Oral)   Resp 16   Ht 5' (1.524 m)   Wt 86.2 kg (190 lb)   LMP 2018   SpO2 100%   Breastfeeding? Unknown   BMI 37.11 kg/m²       Pain/Dale Score: Pain Rating Prior to Med Admin: 6 (3/22/2019  1:10 PM)  Pain Rating Post Med Admin: 0 (3/22/2019  2:00 PM)

## 2019-03-25 ENCOUNTER — TELEPHONE (OUTPATIENT)
Dept: OBSTETRICS AND GYNECOLOGY | Facility: HOSPITAL | Age: 24
End: 2019-03-25

## 2019-03-25 ENCOUNTER — HOSPITAL ENCOUNTER (EMERGENCY)
Facility: HOSPITAL | Age: 24
Discharge: HOME | End: 2019-03-25
Attending: EMERGENCY MEDICINE
Payer: MEDICAID

## 2019-03-25 ENCOUNTER — NURSE TRIAGE (OUTPATIENT)
Dept: ADMINISTRATIVE | Facility: CLINIC | Age: 24
End: 2019-03-25

## 2019-03-25 VITALS
HEART RATE: 64 BPM | DIASTOLIC BLOOD PRESSURE: 85 MMHG | WEIGHT: 160 LBS | OXYGEN SATURATION: 96 % | HEIGHT: 60 IN | RESPIRATION RATE: 18 BRPM | BODY MASS INDEX: 31.41 KG/M2 | SYSTOLIC BLOOD PRESSURE: 129 MMHG | TEMPERATURE: 99 F

## 2019-03-25 DIAGNOSIS — E13.8 DIABETES MELLITUS OF OTHER TYPE WITH COMPLICATION, UNSPECIFIED WHETHER LONG TERM INSULIN USE: ICD-10-CM

## 2019-03-25 DIAGNOSIS — R51.9 NONINTRACTABLE HEADACHE, UNSPECIFIED CHRONICITY PATTERN, UNSPECIFIED HEADACHE TYPE: Primary | ICD-10-CM

## 2019-03-25 LAB
ALBUMIN SERPL BCP-MCNC: 2.7 G/DL (ref 3.5–5.2)
ALP SERPL-CCNC: 139 U/L (ref 55–135)
ALT SERPL W/O P-5'-P-CCNC: 42 U/L (ref 10–44)
ANION GAP SERPL CALC-SCNC: 10 MMOL/L (ref 8–16)
AST SERPL-CCNC: 30 U/L (ref 10–40)
BASOPHILS # BLD AUTO: 0.01 K/UL (ref 0–0.2)
BASOPHILS NFR BLD: 0.1 % (ref 0–1.9)
BILIRUB SERPL-MCNC: 0.4 MG/DL (ref 0.1–1)
BILIRUB UR QL STRIP: NEGATIVE
BUN SERPL-MCNC: 7 MG/DL (ref 6–20)
CALCIUM SERPL-MCNC: 9.3 MG/DL (ref 8.7–10.5)
CHLORIDE SERPL-SCNC: 105 MMOL/L (ref 95–110)
CLARITY UR: CLEAR
CO2 SERPL-SCNC: 22 MMOL/L (ref 23–29)
COLOR UR: ABNORMAL
CREAT SERPL-MCNC: 0.9 MG/DL (ref 0.5–1.4)
DIFFERENTIAL METHOD: ABNORMAL
EOSINOPHIL # BLD AUTO: 0.2 K/UL (ref 0–0.5)
EOSINOPHIL NFR BLD: 2.1 % (ref 0–8)
ERYTHROCYTE [DISTWIDTH] IN BLOOD BY AUTOMATED COUNT: 15.2 % (ref 11.5–14.5)
EST. GFR  (AFRICAN AMERICAN): >60 ML/MIN/1.73 M^2
EST. GFR  (NON AFRICAN AMERICAN): >60 ML/MIN/1.73 M^2
GLUCOSE SERPL-MCNC: 225 MG/DL (ref 70–110)
GLUCOSE UR QL STRIP: NEGATIVE
HCT VFR BLD AUTO: 41 % (ref 37–48.5)
HGB BLD-MCNC: 13.6 G/DL (ref 12–16)
HGB UR QL STRIP: ABNORMAL
KETONES UR QL STRIP: NEGATIVE
LEUKOCYTE ESTERASE UR QL STRIP: ABNORMAL
LYMPHOCYTES # BLD AUTO: 1.8 K/UL (ref 1–4.8)
LYMPHOCYTES NFR BLD: 16.5 % (ref 18–48)
MCH RBC QN AUTO: 26.1 PG (ref 27–31)
MCHC RBC AUTO-ENTMCNC: 33.2 G/DL (ref 32–36)
MCV RBC AUTO: 79 FL (ref 82–98)
MICROSCOPIC COMMENT: NORMAL
MONOCYTES # BLD AUTO: 0.4 K/UL (ref 0.3–1)
MONOCYTES NFR BLD: 3.5 % (ref 4–15)
NEUTROPHILS # BLD AUTO: 8.6 K/UL (ref 1.8–7.7)
NEUTROPHILS NFR BLD: 77.8 % (ref 38–73)
NITRITE UR QL STRIP: NEGATIVE
PH UR STRIP: 7 [PH] (ref 5–8)
PLATELET # BLD AUTO: 374 K/UL (ref 150–350)
PMV BLD AUTO: 10.1 FL (ref 9.2–12.9)
POCT GLUCOSE: 238 MG/DL (ref 70–110)
POTASSIUM SERPL-SCNC: 4.6 MMOL/L (ref 3.5–5.1)
PROT SERPL-MCNC: 7.2 G/DL (ref 6–8.4)
PROT UR QL STRIP: NEGATIVE
RBC # BLD AUTO: 5.21 M/UL (ref 4–5.4)
RBC #/AREA URNS HPF: 4 /HPF (ref 0–4)
SODIUM SERPL-SCNC: 137 MMOL/L (ref 136–145)
SP GR UR STRIP: 1.01 (ref 1–1.03)
SQUAMOUS #/AREA URNS HPF: NORMAL /HPF
URATE SERPL-MCNC: 6.3 MG/DL (ref 2.4–5.7)
URN SPEC COLLECT METH UR: ABNORMAL
UROBILINOGEN UR STRIP-ACNC: ABNORMAL EU/DL
WBC # BLD AUTO: 11.05 K/UL (ref 3.9–12.7)
WBC #/AREA URNS HPF: 3 /HPF (ref 0–5)

## 2019-03-25 PROCEDURE — 84550 ASSAY OF BLOOD/URIC ACID: CPT

## 2019-03-25 PROCEDURE — 81000 URINALYSIS NONAUTO W/SCOPE: CPT

## 2019-03-25 PROCEDURE — 25000003 PHARM REV CODE 250: Performed by: NURSE PRACTITIONER

## 2019-03-25 PROCEDURE — 80053 COMPREHEN METABOLIC PANEL: CPT

## 2019-03-25 PROCEDURE — 85025 COMPLETE CBC W/AUTO DIFF WBC: CPT

## 2019-03-25 PROCEDURE — 99283 EMERGENCY DEPT VISIT LOW MDM: CPT | Mod: 25

## 2019-03-25 PROCEDURE — 82962 GLUCOSE BLOOD TEST: CPT

## 2019-03-25 RX ORDER — ACETAMINOPHEN 500 MG
1000 TABLET ORAL
Status: COMPLETED | OUTPATIENT
Start: 2019-03-25 | End: 2019-03-25

## 2019-03-25 RX ADMIN — ACETAMINOPHEN 1000 MG: 500 TABLET, FILM COATED ORAL at 12:03

## 2019-03-25 NOTE — TELEPHONE ENCOUNTER
Reason for Disposition   Severe headache    Protocols used: POSTPARTUM - VISION LOSS OR CHANGE-A-AH    Pt states she had  d/t HTN on 3/20/19. Pt states she continues with HA and blurred vision daily. Pt denies being prescribed BP medication. Pt advised per protocol to ED and pt verbalizes understanding.

## 2019-03-25 NOTE — TELEPHONE ENCOUNTER
Lactation Telephone Follow-up:  Currently in ER; Left message with return phone number on voicemail.

## 2019-03-25 NOTE — ED PROVIDER NOTES
Encounter Date: 3/25/2019  This is a SORT/MSE of a 23 y.o. female recently DC'ed on 3/22 after  at 36 weeks for symptomatic HTN presenting to the ED with c/o headache and blurry vision. Care will be transferred to an alternate provider when patient is roomed for a full evaluation and final disposition. Patient is aware that he/she is awaiting a room in the emergency department, where another provider will review results, evaluate and treat as needed. AGUEDA Leroy DNP  SCRIBE #1 NOTE: IReinier, larry scribing for, and in the presence of,  Vika Her NP. I have scribed the following portions of the note - Other sections scribed: HPI, ROS.       History     Chief Complaint   Patient presents with    Headache     gave birth to baby 3/20; headache and blurry vision since week before giving birth; states gave birth early due to high bp     CC: Headache    HPI: This 23 y.o. Female with diabetes mellitus type I, heart murmur and sickle cell trait presents to the ED for an evaluation of a posterior headache and blurry vision for the past 2 weeks. Pt reports being postpartum 5 days and she had her baby 4 weeks early b/c of eclampsia. She has not taken any meds for her symptoms. Pt denies any other symptoms. No alleviating factors present.    OB: Dr. Rudy Case    The history is provided by the patient. No  was used.     Review of patient's allergies indicates:   Allergen Reactions    Clindamycin Anaphylaxis and Hives     Past Medical History:   Diagnosis Date    Diabetes mellitus type I     Heart murmur     Sickle cell trait      Past Surgical History:   Procedure Laterality Date     SECTION       SECTION N/A 3/20/2019    Performed [facility] at [facility] [place] L&[facility]    cyst removed on buttox  2011    DELIVERY-CEASAREAN SECTION N/A 10/16/2014    Performed by Rudy Case MD at [facility] [place] L&D OR    DILATION AND CURETTAGE OF UTERUS  13    I&D VULVA ABCESS Left  7/23/2014    [facility]    PILONIDAL CYST DRAINAGE  2014     Family History   Problem Relation Age of Onset    Diabetes Paternal Grandmother     Hypertension Paternal Grandmother     Asthma Brother     Thyroid disease Mother     Congenital heart disease Son         hole in heart    Cardiomyopathy Neg Hx     Arrhythmia Neg Hx     Heart attacks under age 50 Neg Hx     Pacemaker/defibrilator Neg Hx      Social History     Tobacco Use    Smoking status: Never Smoker    Smokeless tobacco: Never Used   Substance Use Topics    Alcohol use: No    Drug use: No     Review of Systems   Constitutional: Negative for chills and fever.   HENT: Negative for ear pain, rhinorrhea and sore throat.    Eyes: Negative for redness.        (+) blurry vision   Respiratory: Negative for shortness of breath.    Cardiovascular: Negative for chest pain.   Gastrointestinal: Negative for abdominal pain, diarrhea, nausea and vomiting.   Genitourinary: Negative for dysuria and hematuria.   Musculoskeletal: Negative for back pain and neck pain.   Skin: Negative for rash.   Neurological: Positive for headaches. Negative for weakness and numbness.   Hematological: Does not bruise/bleed easily.   Psychiatric/Behavioral: The patient is not nervous/anxious.        Physical Exam     Initial Vitals [03/25/19 1053]   BP Pulse Resp Temp SpO2   133/81 72 20 98.8 °F (37.1 °C) 99 %      MAP       --         Physical Exam    Nursing note and vitals reviewed.  Constitutional: She appears well-developed and well-nourished.   HENT:   Head: Normocephalic.   Mouth/Throat: Oropharynx is clear and moist.   PERRLA, EOMI   Eyes: Conjunctivae and EOM are normal. Pupils are equal, round, and reactive to light.   Neck: Normal range of motion. Neck supple.   Cardiovascular: Normal rate, regular rhythm and normal heart sounds.   Pulmonary/Chest: Breath sounds normal.   Musculoskeletal: Normal range of motion. She exhibits no edema or  tenderness.   Lymphadenopathy:     She has no cervical adenopathy.   Neurological: She is alert and oriented to person, place, and time. She displays normal reflexes.   Skin: Skin is warm and dry. Capillary refill takes less than 2 seconds. No rash noted.         ED Course   Procedures  Labs Reviewed   URINALYSIS, REFLEX TO URINE CULTURE - Abnormal; Notable for the following components:       Result Value    Occult Blood UA 2+ (*)     Urobilinogen, UA 2.0-3.0 (*)     Leukocytes, UA Trace (*)     All other components within normal limits    Narrative:     Preferred Collection Type->Urine, Clean Catch   CBC W/ AUTO DIFFERENTIAL - Abnormal; Notable for the following components:    MCV 79 (*)     MCH 26.1 (*)     RDW 15.2 (*)     Platelets 374 (*)     Gran # (ANC) 8.6 (*)     Gran% 77.8 (*)     Lymph% 16.5 (*)     Mono% 3.5 (*)     All other components within normal limits   COMPREHENSIVE METABOLIC PANEL - Abnormal; Notable for the following components:    CO2 22 (*)     Glucose 225 (*)     Albumin 2.7 (*)     Alkaline Phosphatase 139 (*)     All other components within normal limits   URIC ACID - Abnormal; Notable for the following components:    Uric Acid 6.3 (*)     All other components within normal limits   URINALYSIS MICROSCOPIC    Narrative:     Preferred Collection Type->Urine, Clean Catch          Imaging Results    None          Medical Decision Making:   Differential Diagnosis:   Preeclampsia, hypertension,  diabetes  ED Management:  Diagnosis management comments: This is an urgent evaluation of a 23-year-old female that presented to the ER with c/o intermittent headache and blurry vision since giving birth 5 days ago. Pts exam was as above.     Labs were reviewed and discussed with pt.  Patient's blood glucose is elevated 225.  Otherwise there was no protein in her urine and her chemistry was within normal limits.    Dr. Pope, Ob/gyn was consulted at 2:00 p.m..  He states that he believes her headaches and  blurry vision or secondary to her elevated blood glucose being she has not been taking her insulin as directed.  He states that her blood pressure is within acceptable limits and her labs are reassuring.  A gm of Tylenol was given in emergency department and headache has resolved.  I have instructed patient to use sliding scale insulin before her meals and follow up with her internal medicine physician that manages diabetes.    Based on exam today - I have low suspicion for medical, surgical or other life threatening condition and I believe pt is safe for discharge and outpatient f/u.    Pt verbalizes understanding of d/c instructions and will return for worsening condition.                Scribe Attestation:   Scribe #1: I performed the above scribed service and the documentation accurately describes the services I performed. I attest to the accuracy of the note.    Attending Attestation:           Physician Attestation for Scribe:  Physician Attestation Statement for Scribe #1: I, Vika Her NP, reviewed documentation, as scribed by Reinier Stewart in my presence, and it is both accurate and complete.                 ED Course as of Mar 25 1657   Mon Mar 25, 2019   1247 BP: 133/81 [CS]   1247 BP: 133/81 [CS]   1247 BP: 133/81 [CS]   1247 BP: 133/81 [CS]   1247 BP: 133/81 [CS]   1247 BP: 133/81 [CS]   1247 BP: 133/81 [CS]      ED Course User Index  [CS] Vika Her NP     Clinical Impression:       ICD-10-CM ICD-9-CM   1. Nonintractable headache, unspecified chronicity pattern, unspecified headache type R51 784.0   2. Diabetes mellitus of other type with complication, unspecified whether long term insulin use E13.8 250.90         Disposition:   Disposition: Discharged  Condition: Stable                        Vika Her NP  03/25/19 1657

## 2019-03-25 NOTE — ED TRIAGE NOTES
Patient reports intermittent headaches and blurred vision since giving birth 3/20. Reports early delivery due to high blood pressure. Denies n/v. Reports taking Ibuprofen for the pain, last taken on yesterday.

## 2019-03-26 ENCOUNTER — TELEPHONE (OUTPATIENT)
Dept: OBSTETRICS AND GYNECOLOGY | Facility: HOSPITAL | Age: 24
End: 2019-03-26

## 2019-03-27 ENCOUNTER — TELEPHONE (OUTPATIENT)
Dept: OBSTETRICS AND GYNECOLOGY | Facility: HOSPITAL | Age: 24
End: 2019-03-27

## 2019-03-27 NOTE — TELEPHONE ENCOUNTER
Spoke to pt who states baby breastfeeding about every 2-3 hours -sometimes fall asleep and does not empty breasts well so she is pumping also and will offer some EBM if he does not nurse well --has been able to pump up to 4 ounces -breasts refill quick -reinforced normal and reinforced empty at least 8 -10 times in 24 hours with baby or pump-baby having about 3 dirty diapers in 24 hours -stools are yellow and having many wet diapers -passes a lot of gas  -weight was 5#13 ounces at pediatrician visit this week -up from discharge weight but is currently at hospital getting bilirubin re-checked  and will see pediatrician on Monday -reviewed breast milk storage guidelines per mother's questions and encouraged continued frequent feeding with supplementation until sees pediatrician -will follow-up in next 48 hours

## 2019-03-29 ENCOUNTER — TELEPHONE (OUTPATIENT)
Dept: OBSTETRICS AND GYNECOLOGY | Facility: HOSPITAL | Age: 24
End: 2019-03-29

## 2019-03-31 ENCOUNTER — TELEPHONE (OUTPATIENT)
Dept: OBSTETRICS AND GYNECOLOGY | Facility: HOSPITAL | Age: 24
End: 2019-03-31

## 2019-04-11 ENCOUNTER — POSTPARTUM VISIT (OUTPATIENT)
Dept: OBSTETRICS AND GYNECOLOGY | Facility: CLINIC | Age: 24
End: 2019-04-11
Payer: MEDICAID

## 2019-04-11 VITALS
WEIGHT: 165.81 LBS | BODY MASS INDEX: 32.55 KG/M2 | HEIGHT: 60 IN | DIASTOLIC BLOOD PRESSURE: 66 MMHG | SYSTOLIC BLOOD PRESSURE: 124 MMHG

## 2019-04-11 DIAGNOSIS — E10.9 TYPE 1 DIABETES MELLITUS WITHOUT COMPLICATION: ICD-10-CM

## 2019-04-11 PROCEDURE — 99999 PR PBB SHADOW E&M-EST. PATIENT-LVL III: ICD-10-PCS | Mod: PBBFAC,,, | Performed by: OBSTETRICS & GYNECOLOGY

## 2019-04-11 PROCEDURE — 59430 PR CARE AFTER DELIVERY ONLY: ICD-10-PCS | Mod: S$PBB,,, | Performed by: OBSTETRICS & GYNECOLOGY

## 2019-04-11 PROCEDURE — 99213 OFFICE O/P EST LOW 20 MIN: CPT | Mod: PBBFAC | Performed by: OBSTETRICS & GYNECOLOGY

## 2019-04-11 PROCEDURE — 99999 PR PBB SHADOW E&M-EST. PATIENT-LVL III: CPT | Mod: PBBFAC,,, | Performed by: OBSTETRICS & GYNECOLOGY

## 2019-04-11 RX ORDER — SERTRALINE HYDROCHLORIDE 25 MG/1
50 TABLET, FILM COATED ORAL DAILY
Qty: 30 TABLET | Refills: 2 | Status: SHIPPED | OUTPATIENT
Start: 2019-04-11 | End: 2019-04-11

## 2019-04-11 RX ORDER — SERTRALINE HYDROCHLORIDE 50 MG/1
50 TABLET, FILM COATED ORAL DAILY
Qty: 30 TABLET | Refills: 4 | Status: SHIPPED | OUTPATIENT
Start: 2019-04-11 | End: 2019-10-07 | Stop reason: CLARIF

## 2019-04-11 NOTE — PROGRESS NOTES
Subjective:       Patient ID: Jay Schmid is a 23 y.o. female.    Chief Complaint:  Postpartum Follow-up (3 wks pp for RCS on )      History of Present Illness  HPI  Ms Schmid is a 23 years old, status post repeat low transverse  section and bilateral salpingectomy on 3/20/2019.  She comes in today for a postpartum exam and followup.  Patient has no current complaints.  No fever or chills.  No nausea or vomiting.  No diarrhea or constipation.  No abdominal or pelvic pain.    She has NOT resumed normal intercourse.  Patient has begun some walking for exercise. She is having signs and symptoms of significant postpartum depression.  She is bottle-feeding well.    Denies homicidal or suicidal ideation.      GYN & OB History  Patient's last menstrual period was 2018.   Date of Last Pap: 3/24/2016    OB History    Para Term  AB Living   3 2 1 1 1 2   SAB TAB Ectopic Multiple Live Births     1   0 2      # Outcome Date GA Lbr Favian/2nd Weight Sex Delivery Anes PTL Lv   3  19 36w0d  2.73 kg (6 lb 0.3 oz) M CS-LTranv Spinal Y KARISSA      Complications: Hypertension affecting pregnancy in third trimester   2 Term 10/17/14 37w1d  2.999 kg (6 lb 9.8 oz) M CS-LTranv EPI  KARISSA   1 TAB 13             Past Medical History:   Diagnosis Date    Diabetes mellitus type I     Heart murmur     Sickle cell trait        Past Surgical History:   Procedure Laterality Date     SECTION       SECTION N/A 3/20/2019    [facility]    cyst removed on buttox  2011    DELIVERY-CEASAREAN SECTION N/A 10/16/2014    [facility] at [facility] L&D OR    DILATION AND CURETTAGE OF UTERUS  13    I&D VULVA ABCESS Left 2014    [facility] at [facility]    PILONIDAL CYST DRAINAGE         Family History   Problem Relation Age of Onset    Diabetes Paternal Grandmother     Hypertension Paternal Grandmother      "Asthma Brother     Thyroid disease Mother     Congenital heart disease Son         hole in heart    Cardiomyopathy Neg Hx     Arrhythmia Neg Hx     Heart attacks under age 50 Neg Hx     Pacemaker/defibrilator Neg Hx        Social History     Socioeconomic History    Marital status: Single     Spouse name: Not on file    Number of children: Not on file    Years of education: Not on file    Highest education level: Not on file   Occupational History    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[employer]    Social Needs    Financial resource strain: Not on file    Food insecurity:     Worry: Not on file     Inability: Not on file    Transportation needs:     Medical: Not on file     Non-medical: Not on file   Tobacco Use    Smoking status: Never Smoker    Smokeless tobacco: Never Used   Substance and Sexual Activity    Alcohol use: No    Drug use: No    Sexual activity: Yes     Partners: Male     Birth control/protection: IUD   Lifestyle    Physical activity:     Days per week: Not on file     Minutes per session: Not on file    Stress: Not on file   Relationships    Social connections:     Talks on phone: Not on file     Gets together: Not on file     Attends Scientologist service: Not on file     Active member of club or organization: Not on file     Attends meetings of clubs or organizations: Not on file     Relationship status: Not on file   Other Topics Concern    Not on file   Social History Narrative    No partner. In school, at [employer] for practical nursing.       Current Outpatient Medications   Medication Sig Dispense Refill    BD ULTRA-FINE GABRIELE PEN NEEDLE 32 gauge x 5/32" Ndle USE TO INJECT QID  6    blood glucose strip-disp meter Kit Preferred by insurance, use as directed. 1 kit 0    blood sugar diagnostic Strp To check BG 6x times daily, to use with insurance preferred meter 200 strip 11    ibuprofen (ADVIL,MOTRIN) 600 MG tablet Take 1 tablet (600 mg total) by mouth every 6 (six) hours as " "needed. 40 tablet 1    insulin detemir U-100 (LEVEMIR FLEXTOUCH) 100 unit/mL (3 mL) SubQ InPn pen Inject 20 Units into the skin every evening. 3 mL 3    insulin lispro 100 unit/mL injection 20 units with each meal 10 mL 3    insulin regular 100 unit/mL Inj injection Inject 10 Units into the skin 3 (three) times daily. 10 mL 3    oxyCODONE-acetaminophen (PERCOCET) 5-325 mg per tablet Take 1 tablet by mouth every 4 (four) hours as needed. 20 tablet 0    pen needle, diabetic (BD ULTRA-FINE GABRIELE PEN NEEDLE) 32 gauge x 5/32" Ndle USE TO INJECT FOUR TIMES DAILY 100 each 11     No current facility-administered medications for this visit.        Review of patient's allergies indicates:   Allergen Reactions    Clindamycin Anaphylaxis and Hives       Review of Systems  Review of Systems   Constitutional: Positive for fatigue. Negative for activity change, appetite change, chills, fever and unexpected weight change.   HENT: Negative for mouth sores.    Respiratory: Negative for cough, shortness of breath and wheezing.    Cardiovascular: Negative for chest pain and palpitations.   Gastrointestinal: Negative for abdominal pain, bloating, blood in stool, constipation, nausea and vomiting.   Endocrine: Negative for diabetes and hot flashes.   Genitourinary: Negative for dysmenorrhea, dyspareunia, dysuria, frequency, hematuria, menorrhagia, menstrual problem, pelvic pain, urgency, vaginal bleeding, vaginal discharge, vaginal pain, urinary incontinence, postcoital bleeding and vaginal odor.   Musculoskeletal: Negative for back pain and myalgias.   Integumentary:  Negative for rash, breast mass and nipple discharge.   Neurological: Negative for seizures and headaches.   Psychiatric/Behavioral: Positive for depression and sleep disturbance. The patient is nervous/anxious.         Irritable   Breast: Negative for mass, mastodynia and nipple discharge          Objective:    Physical Exam:   Constitutional: She appears " well-developed and well-nourished. No distress.    HENT:   Head: Normocephalic and atraumatic.    Eyes: EOM are normal.    Neck: Normal range of motion.    Cardiovascular: Normal rate.     Pulmonary/Chest: Effort normal. No respiratory distress.        Abdominal: Soft. She exhibits no distension. There is no tenderness. There is no rebound and no guarding.   Pfannenstiel is clean, dry, intact.  Healing well without any evidence of infection.                 Musculoskeletal: Normal range of motion.       Neurological: She is alert.    Skin: Skin is warm and dry.    Psychiatric: She has a normal mood and affect.          Assessment:        1. Postpartum care and examination    2. Type 1 diabetes mellitus without complication    3.  Postpartum depression         Plan:      I have discussed with the patient regarding her condition  She is doing well physically recovering from her surgery/delivery    We discussed her depression.  Does not appear to be secondary to tubal ligation.  We discussed how postpartum depression is mostly an adjustment disorder.  It is difficult for her to take care of two children without much help from her partner    Will start sertraline at 50 mg daily  Back in a month for follow-up.

## 2019-04-24 ENCOUNTER — HOSPITAL ENCOUNTER (EMERGENCY)
Facility: HOSPITAL | Age: 24
Discharge: HOME | End: 2019-04-24
Attending: EMERGENCY MEDICINE
Payer: MEDICAID

## 2019-04-24 VITALS
BODY MASS INDEX: 32.79 KG/M2 | DIASTOLIC BLOOD PRESSURE: 78 MMHG | OXYGEN SATURATION: 97 % | SYSTOLIC BLOOD PRESSURE: 131 MMHG | HEIGHT: 60 IN | HEART RATE: 97 BPM | TEMPERATURE: 99 F | RESPIRATION RATE: 18 BRPM | WEIGHT: 167 LBS

## 2019-04-24 DIAGNOSIS — R59.1 LYMPHADENOPATHY OF HEAD AND NECK: ICD-10-CM
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
## 2019-04-24 PROCEDURE — 99283 EMERGENCY DEPT VISIT LOW MDM: CPT

## 2019-04-24 RX ORDER — CEPHALEXIN 500 MG/1
500 CAPSULE ORAL EVERY 12 HOURS
Qty: 10 CAPSULE | Refills: 0 | Status: SHIPPED | OUTPATIENT
Start: 2019-04-24 | End: 2019-04-29

## 2019-04-24 NOTE — ED TRIAGE NOTES
"Pt presents to ED with c/o "lump" to back of neck x3 days. Denies fever, chills, N/V/D. Also reports a new lump to her LEFT arm  "

## 2019-04-24 NOTE — ED PROVIDER NOTES
"Encounter Date: 2019       History     Chief Complaint   Patient presents with    Mass     "lump on back of neck for 3 days" No itching. "This morning another lump came on my arm."     22 yo female healthy presents with a two day history of a left posterior neck tender area, possible insect bite...has another one on the left distal wrist. No drainage no other constitutional symptoms. Had another on on the right posterior back that has since disappeared        Review of patient's allergies indicates:   Allergen Reactions    Clindamycin Anaphylaxis and Hives     Past Medical History:   Diagnosis Date    Diabetes mellitus type I     Heart murmur     Sickle cell trait      Past Surgical History:   Procedure Laterality Date     SECTION       SECTION N/A 3/20/2019    [facility]formed [facility] at [facility] L[facility]D OR    cyst removed on buttox  2011    DELIVERY-CEASAREAN SECTION N/A 10/16/2014    Performed by [facility] MD at [facility] L&D OR    DILATION AND CURETTAGE OF UTERUS  13    I&D VULVA ABCESS Left 2014    [facility] at [facility] OR    PILONIDAL CYST DRAINAGE       Family History   Problem Relation Age of Onset    Diabetes Paternal Grandmother     Hypertension Paternal Grandmother     Asthma Brother     Thyroid disease Mother     Congenital heart disease Son         hole in heart    Cardiomyopathy Neg Hx     Arrhythmia Neg Hx     Heart attacks under age 50 Neg Hx     Pacemaker/defibrilator Neg Hx      Social History     Tobacco Use    Smoking status: Never Smoker    Smokeless tobacco: Never Used   Substance Use Topics    Alcohol use: No    Drug use: No     Review of Systems   Constitutional: Negative.    HENT: Negative.    Eyes: Negative.    Respiratory: Negative.    Cardiovascular: Negative.    Gastrointestinal: Negative.    Endocrine: Negative.    Genitourinary: Negative.    Musculoskeletal: Negative.    Skin: Positive for rash.   Neurological: " Negative.    All other systems reviewed and are negative.      Physical Exam     Initial Vitals [04/24/19 1329]   BP Pulse Resp Temp SpO2   131/78 97 18 99.1 °F (37.3 °C) 97 %      MAP       --         Physical Exam    Nursing note and vitals reviewed.  Constitutional: She appears well-developed and well-nourished.   HENT:   Head: Normocephalic and atraumatic.   Eyes: Conjunctivae are normal. Pupils are equal, round, and reactive to light.   Cardiovascular: Normal rate, regular rhythm and normal heart sounds.   Pulmonary/Chest: Breath sounds normal.   Abdominal: Soft.   Lymphadenopathy:     She has cervical adenopathy.   Neurological: She is alert.   Skin:   Small area of mild soft tissue swelling over posterior neck, and left distal wrist, no urticaria, no fluctuance          ED Course   Procedures  Labs Reviewed   POCT URINE PREGNANCY   POCT GLUCOSE MONITORING CONTINUOUS          Imaging Results    None                               Clinical Impression:       ICD-10-CM ICD-9-CM   1. Insect bite, initial encounter W57.XXXA 919.4     E906.4   2. Lymphadenopathy of head and neck R59.1 785.6         Disposition:   Disposition: Discharged  Condition: Stable                        Jorge Catherine MD  04/24/19 1406

## 2019-04-27 ENCOUNTER — HOSPITAL ENCOUNTER (EMERGENCY)
Facility: HOSPITAL | Age: 24
Discharge: HOME | End: 2019-04-27
Attending: EMERGENCY MEDICINE
Payer: MEDICAID

## 2019-04-27 VITALS
DIASTOLIC BLOOD PRESSURE: 68 MMHG | RESPIRATION RATE: 16 BRPM | HEART RATE: 96 BPM | WEIGHT: 150 LBS | HEIGHT: 60 IN | TEMPERATURE: 99 F | SYSTOLIC BLOOD PRESSURE: 124 MMHG | BODY MASS INDEX: 29.45 KG/M2 | OXYGEN SATURATION: 100 %

## 2019-04-27 DIAGNOSIS — R22.1 NECK MASS: Primary | ICD-10-CM

## 2019-04-27 LAB
B-HCG UR QL: NEGATIVE
CTP QC/QA: YES

## 2019-04-27 PROCEDURE — 25000003 PHARM REV CODE 250: Performed by: NURSE PRACTITIONER

## 2019-04-27 PROCEDURE — 81025 URINE PREGNANCY TEST: CPT | Performed by: NURSE PRACTITIONER

## 2019-04-27 PROCEDURE — 99284 EMERGENCY DEPT VISIT MOD MDM: CPT

## 2019-04-27 RX ORDER — SULFAMETHOXAZOLE AND TRIMETHOPRIM 800; 160 MG/1; MG/1
1 TABLET ORAL 2 TIMES DAILY
Qty: 14 TABLET | Refills: 0 | Status: SHIPPED | OUTPATIENT
Start: 2019-04-28 | End: 2019-05-05

## 2019-04-27 RX ORDER — IBUPROFEN 600 MG/1
600 TABLET ORAL EVERY 6 HOURS PRN
Qty: 20 TABLET | Refills: 0 | Status: SHIPPED | OUTPATIENT
Start: 2019-04-27 | End: 2019-10-07 | Stop reason: CLARIF

## 2019-04-27 RX ORDER — SULFAMETHOXAZOLE AND TRIMETHOPRIM 800; 160 MG/1; MG/1
1 TABLET ORAL
Status: COMPLETED | OUTPATIENT
Start: 2019-04-27 | End: 2019-04-27

## 2019-04-27 RX ADMIN — SULFAMETHOXAZOLE AND TRIMETHOPRIM 1 TABLET: 800; 160 TABLET ORAL at 07:04

## 2019-04-27 NOTE — DISCHARGE INSTRUCTIONS

## 2019-04-27 NOTE — ED PROVIDER NOTES
Encounter Date: 2019       History     Chief Complaint   Patient presents with    Neck Pain     Pt c/o swelling to the back of the neck. Pt states swelling for about 8 days. Pt able to move neck but with pain.      CC: Neck pain    HPI:  23-year-old female presenting with a 8 day history of posterior neck mass.  The mass is tender with palpation.  She was evaluated in the emergency department on  for similar symptoms and prescribed Keflex.  She reports the mass has spread.  She reports it is painful when she moves her neck.  She denies fever, chills, rhinorrhea, sore throat, difficulty swallowing, nausea, vomiting, diarrhea.    The history is provided by the patient. No  was used.     Review of patient's allergies indicates:   Allergen Reactions    Clindamycin Anaphylaxis and Hives     Past Medical History:   Diagnosis Date    Diabetes mellitus type I     Heart murmur     Sickle cell trait      Past Surgical History:   Procedure Laterality Date     SECTION       SECTION N/A 3/20/2019    [facility]    cyst removed on buttox  2011    DELIVERY-CEASAREAN SECTION N/A 10/16/2014    [facility] L[facility]D OR    DILATION AND CURETTAGE OF UTERUS  13    I&D VULVA ABCESS Left 2014    [facility]    PILONIDAL CYST DRAINAGE       Family History   Problem Relation Age of Onset    Diabetes Paternal Grandmother     Hypertension Paternal Grandmother     Asthma Brother     Thyroid disease Mother     Congenital heart disease Son         hole in heart    Cardiomyopathy Neg Hx     Arrhythmia Neg Hx     Heart attacks under age 50 Neg Hx     Pacemaker/defibrilator Neg Hx      Social History     Tobacco Use    Smoking status: Never Smoker    Smokeless tobacco: Never Used   Substance Use Topics    Alcohol use: No    Drug use: No     Review of Systems   Constitutional:  Negative for chills and fever.   HENT: Negative for sore throat.    Respiratory: Negative for shortness of breath.    Cardiovascular: Negative for chest pain.   Gastrointestinal: Negative for abdominal pain, diarrhea, nausea and vomiting.   Genitourinary: Negative for dysuria.   Musculoskeletal: Negative for back pain.   Skin: Positive for rash.   Neurological: Negative for weakness.   Hematological: Does not bruise/bleed easily.       Physical Exam     Initial Vitals [04/27/19 1803]   BP Pulse Resp Temp SpO2   122/76 96 18 98.7 °F (37.1 °C) 98 %      MAP       --         Physical Exam    Constitutional: Vital signs are normal. She appears well-developed and well-nourished. She is not diaphoretic. No distress.   HENT:   Head: Normocephalic and atraumatic.   Right Ear: Hearing, tympanic membrane, external ear and ear canal normal.   Left Ear: Hearing, tympanic membrane, external ear and ear canal normal.   Nose: Nose normal.   Mouth/Throat: Uvula is midline and oropharynx is clear and moist. No oropharyngeal exudate.   Eyes: Conjunctivae and EOM are normal. Pupils are equal, round, and reactive to light. Right eye exhibits no discharge. Left eye exhibits no discharge.   Neck: Trachea normal, normal range of motion, full passive range of motion without pain and phonation normal. Neck supple.       Cardiovascular: Normal rate, regular rhythm and normal heart sounds.   Pulmonary/Chest: Breath sounds normal. No respiratory distress.   Abdominal: Soft.   Musculoskeletal: Normal range of motion.   Lymphadenopathy:     She has no cervical adenopathy.   Neurological: She is alert and oriented to person, place, and time.   Skin: Skin is warm and dry.   Psychiatric: She has a normal mood and affect. Her behavior is normal.             ED Course   Procedures  Labs Reviewed   POCT URINE PREGNANCY          Imaging Results    None          Medical Decision Making:   ED Management:  This is the evaluation of a 23 year old female  presenting with neck mass x 8 days. The mass is tender with palpation. She has been taking keflex with no improvement in symptoms. She is afebrile. Ears and throat without infection. I performed a bedside ultrasound. No drainable abscess or cobblestoning present on US to suggest abscess or cellulitis. Possibly posterior cervical chain lymphadenopathy. She denies any fever, chills, headache, otalgia, sore throat, etc. Will prescribed bactrim and refer to dermatology.     This case was discussed with my attending physician who examined the patient and agrees with my plan of care.                       Clinical Impression:       ICD-10-CM ICD-9-CM   1. Neck mass R22.1 784.2         Disposition:   Disposition: Discharged  Condition: Stable                        Michaela Leroy NP  04/28/19 [id number]

## 2019-04-27 NOTE — ED TRIAGE NOTES
"Arrived via personal transportation. Complains of neck pain x 8 days. States "I came here for the same thing a few days ago. I don't know it just hurt when I turn". Pt reports taking antibiotics   "

## 2019-05-02 ENCOUNTER — POSTPARTUM VISIT (OUTPATIENT)
Dept: OBSTETRICS AND GYNECOLOGY | Facility: CLINIC | Age: 24
End: 2019-05-02
Payer: MEDICAID

## 2019-05-02 VITALS
WEIGHT: 165.56 LBS | SYSTOLIC BLOOD PRESSURE: 120 MMHG | DIASTOLIC BLOOD PRESSURE: 70 MMHG | HEIGHT: 60 IN | BODY MASS INDEX: 32.51 KG/M2

## 2019-05-02 DIAGNOSIS — Z98.891 STATUS POST CESAREAN SECTION: ICD-10-CM

## 2019-05-02 PROCEDURE — 0503F POSTPARTUM CARE VISIT: CPT | Mod: S$PBB,,, | Performed by: OBSTETRICS & GYNECOLOGY

## 2019-05-02 PROCEDURE — 99999 PR PBB SHADOW E&M-EST. PATIENT-LVL III: CPT | Mod: PBBFAC,,, | Performed by: OBSTETRICS & GYNECOLOGY

## 2019-05-02 PROCEDURE — 0503F PR POSTPARTUM CARE VISIT: ICD-10-PCS | Mod: S$PBB,,, | Performed by: OBSTETRICS & GYNECOLOGY

## 2019-05-02 PROCEDURE — 99999 PR PBB SHADOW E&M-EST. PATIENT-LVL III: ICD-10-PCS | Mod: PBBFAC,,, | Performed by: OBSTETRICS & GYNECOLOGY

## 2019-05-02 PROCEDURE — 99213 OFFICE O/P EST LOW 20 MIN: CPT | Mod: PBBFAC,25 | Performed by: OBSTETRICS & GYNECOLOGY

## 2019-05-02 NOTE — PROGRESS NOTES
Subjective:       Patient ID: Jay Schmid is a 23 y.o. female.    Chief Complaint:  Postpartum Follow-up (6 wks pp for RCS BTL on 19)      History of Present Illness  HPI  Ms Schmid is a 23 years old, status post repeat low transverse  section and bilateral salpingectomy on 3/20/2019.  She comes in today for a postpartum exam and followup.  Patient has no current complaints.  No fever or chills.  No nausea or vomiting.  No diarrhea or constipation.  No abdominal or pelvic pain.    She has NOT resumed normal intercourse.  Patient has begun some walking for exercise. Seen on 2019 with significant signs and symptoms of significant postpartum depression.  But she is feeling much better now.  She is bottle-feeding well.         GYN & OB History  No LMP recorded.   Date of Last Pap: 3/24/2016    OB History    Para Term  AB Living   3 2 1 1 1 2   SAB TAB Ectopic Multiple Live Births     1   0 2      # Outcome Date GA Lbr Favian/2nd Weight Sex Delivery Anes PTL Lv   3  19 36w0d  2.73 kg (6 lb 0.3 oz) M CS-LTranv Spinal Y KARISSA      Complications: Hypertension affecting pregnancy in third trimester   2 Term 10/17/14 37w1d  2.999 kg (6 lb 9.8 oz) M CS-LTranv EPI  KARISSA   1 TAB 13             Past Medical History:   Diagnosis Date    Diabetes mellitus type I     Heart murmur     Sickle cell trait        Past Surgical History:   Procedure Laterality Date     SECTION       SECTION N/A 3/20/2019    [facility]    cyst removed on buttox  2011    DELIVERY-CEASAREAN SECTION N/A 10/16/2014    [facility] at [facility] L[facility]D OR    DILATION AND CURETTAGE OF UTERUS  13    I&D VULVA ABCESS Left 2014    [facility] at [facility]    PILONIDAL CYST DRAINAGE         Family History   Problem Relation Age of Onset    Diabetes Paternal Grandmother     Hypertension Paternal Grandmother      "Asthma Brother     Thyroid disease Mother     Congenital heart disease Son         hole in heart    Cardiomyopathy Neg Hx     Arrhythmia Neg Hx     Heart attacks under age 50 Neg Hx     Pacemaker/defibrilator Neg Hx        Social History     Socioeconomic History    Marital status: Single     Spouse name: Not on file    Number of children: Not on file    Years of education: Not on file    Highest education level: Not on file   Occupational History    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[employer]    Social Needs    Financial resource strain: Not on file    Food insecurity:     Worry: Not on file     Inability: Not on file    Transportation needs:     Medical: Not on file     Non-medical: Not on file   Tobacco Use    Smoking status: Never Smoker    Smokeless tobacco: Never Used   Substance and Sexual Activity    Alcohol use: No    Drug use: No    Sexual activity: Yes     Partners: Male     Birth control/protection: IUD   Lifestyle    Physical activity:     Days per week: Not on file     Minutes per session: Not on file    Stress: Not on file   Relationships    Social connections:     Talks on phone: Not on file     Gets together: Not on file     Attends [employer] service: Not on file     Active member of club or organization: Not on file     Attends meetings of clubs or organizations: Not on file     Relationship status: Not on file   Other Topics Concern    Not on file   Social History Narrative    No partner. In school, at [employer] for practical nursing.       Current Outpatient Medications   Medication Sig Dispense Refill    BD ULTRA-FINE GABRIELE PEN NEEDLE 32 gauge x 5/32" Ndle USE TO INJECT QID  6    blood glucose strip-disp meter Kit Preferred by insurance, use as directed. 1 kit 0    blood sugar diagnostic Strp To check BG 6x times daily, to use with insurance preferred meter 200 strip 11    ibuprofen (ADVIL,MOTRIN) 600 MG tablet Take 1 tablet (600 mg total) by mouth every 6 (six) hours as " "needed for Pain. 20 tablet 0    insulin detemir U-100 (LEVEMIR FLEXTOUCH) 100 unit/mL (3 mL) SubQ InPn pen Inject 20 Units into the skin every evening. 3 mL 3    insulin lispro 100 unit/mL injection 20 units with each meal 10 mL 3    insulin regular 100 unit/mL Inj injection Inject 10 Units into the skin 3 (three) times daily. 10 mL 3    oxyCODONE-acetaminophen (PERCOCET) 5-325 mg per tablet Take 1 tablet by mouth every 4 (four) hours as needed. 20 tablet 0    pen needle, diabetic (BD ULTRA-FINE GABRIELE PEN NEEDLE) 32 gauge x 5/32" Ndle USE TO INJECT FOUR TIMES DAILY 100 each 11    sertraline (ZOLOFT) 50 MG tablet Take 1 tablet (50 mg total) by mouth once daily. 30 tablet 4    sulfamethoxazole-trimethoprim 800-160mg (BACTRIM DS) 800-160 mg Tab Take 1 tablet by mouth 2 (two) times daily. for 7 days 14 tablet 0     No current facility-administered medications for this visit.        Review of patient's allergies indicates:   Allergen Reactions    Clindamycin Anaphylaxis and Hives       Review of Systems  Review of Systems   Constitutional: Positive for fatigue. Negative for activity change, appetite change, chills, fever and unexpected weight change.   HENT: Negative for mouth sores.    Respiratory: Negative for cough, shortness of breath and wheezing.    Cardiovascular: Negative for chest pain and palpitations.   Gastrointestinal: Negative for abdominal pain, bloating, blood in stool, constipation, nausea and vomiting.   Endocrine: Negative for diabetes and hot flashes.   Genitourinary: Negative for dysmenorrhea, dyspareunia, dysuria, frequency, hematuria, menorrhagia, menstrual problem, pelvic pain, urgency, vaginal bleeding, vaginal discharge, vaginal pain, urinary incontinence, postcoital bleeding and vaginal odor.   Musculoskeletal: Negative for back pain and myalgias.   Integumentary:  Negative for rash, breast mass and nipple discharge.   Neurological: Negative for seizures and headaches. "   Psychiatric/Behavioral: Positive for depression and sleep disturbance. The patient is not nervous/anxious.         But feeling much better as compared to last month.   Breast: Negative for mass, mastodynia and nipple discharge          Objective:    Physical Exam:   Constitutional: She appears well-developed and well-nourished. No distress.    HENT:   Head: Normocephalic and atraumatic.    Eyes: EOM are normal.    Neck: Normal range of motion.     Pulmonary/Chest: Effort normal. No respiratory distress.        Abdominal: Soft. She exhibits no distension. There is no tenderness. There is no rebound and no guarding.   Pfannenstiel scars healed well.     Genitourinary: Vagina normal and uterus normal. No vaginal discharge found.   Genitourinary Comments: Vulva without any obvious lesions.  Vaginal vault with good support.  Minimal white discharge noted.  No obvious lesion.  Cervix is without any cervical motion tenderness.  No obvious lesion.  Uterus is small, non-tender, normal contour.  Adnexa is without any masses or tenderness.           Musculoskeletal: Normal range of motion.       Neurological: She is alert.    Skin: Skin is warm and dry.    Psychiatric: She has a normal mood and affect.          Assessment:        1. Postpartum care and examination immediately after delivery    2. Status post  section    3.  Diabetes mellitus, type 1         Plan:      I have extensively discussed with the patient regarding her condition.  She has recovered well from her delivery/ section.  She does not need contraception since tubal ligation was performed at her  section on 3/20/2019.    We discussed her glucose control and diabetes mellitus.  The importance of compliance stressed.  She would need her eye and foot exam.  She would need to return to her PCP soon for care.    Back in about 3-6 months for Pap and exam.

## 2019-07-11 ENCOUNTER — HOSPITAL ENCOUNTER (EMERGENCY)
Facility: HOSPITAL | Age: 24
Discharge: HOME | End: 2019-07-11
Attending: EMERGENCY MEDICINE
Payer: MEDICAID

## 2019-07-11 VITALS
DIASTOLIC BLOOD PRESSURE: 64 MMHG | HEART RATE: 78 BPM | HEIGHT: 60 IN | SYSTOLIC BLOOD PRESSURE: 124 MMHG | TEMPERATURE: 98 F | BODY MASS INDEX: 33.77 KG/M2 | WEIGHT: 172 LBS | RESPIRATION RATE: 17 BRPM | OXYGEN SATURATION: 98 %

## 2019-07-11 DIAGNOSIS — M79.671 FOOT PAIN, RIGHT: ICD-10-CM

## 2019-07-11 PROCEDURE — 99283 EMERGENCY DEPT VISIT LOW MDM: CPT

## 2019-07-11 NOTE — ED PROVIDER NOTES
Encounter Date: 2019       History     Chief Complaint   Patient presents with    Foot Pain     pt reports that friday last week she had a fall and since the fall has had right foot pain     Patient is a 23-year-old woman who presents to the emergency department today secondary to right foot pain.  She states she was wearing a high-heeled shoe when her foot inverted approximately 1 week ago.  She states that she has had pain over her right 5th digit and the right lateral aspect of her foot since this time.  She has not taken anything for the pain. She states it hurts to wear a shoe with an arch in it.  She has no other complaints.        Review of patient's allergies indicates:   Allergen Reactions    Clindamycin Anaphylaxis and Hives     Past Medical History:   Diagnosis Date    Diabetes mellitus type I     Heart murmur     Sickle cell trait      Past Surgical History:   Procedure Laterality Date     SECTION       SECTION N/A 3/20/2019    [facility]    cyst removed on buttox  2011    DELIVERY-CEASAREAN SECTION N/A 10/16/2014    [facility] L[facility]D [facility]    DILATION AND CURETTAGE OF UTERUS  13    I&D VULVA ABCESS Left 2014    [facility]    PILONIDAL CYST DRAINAGE       Family History   Problem Relation Age of Onset    Diabetes Paternal Grandmother     Hypertension Paternal Grandmother     Asthma Brother     Thyroid disease Mother     Congenital heart disease Son         hole in heart    Cardiomyopathy Neg Hx     Arrhythmia Neg Hx     Heart attacks under age 50 Neg Hx     Pacemaker/defibrilator Neg Hx      Social History     Tobacco Use    Smoking status: Never Smoker    Smokeless tobacco: Never Used   Substance Use Topics    Alcohol use: No    Drug use: No     Review of Systems   Constitutional: Negative for activity change and fever.   HENT: Negative for congestion.     Respiratory: Negative for chest tightness.    Cardiovascular: Negative for leg swelling.   Gastrointestinal: Negative for abdominal pain.   Musculoskeletal: Positive for arthralgias.        Right foot pain.   Skin: Negative for wound.   Neurological: Negative for headaches.   Hematological: Does not bruise/bleed easily.   All other systems reviewed and are negative.      Physical Exam     Initial Vitals [07/11/19 1809]   BP Pulse Resp Temp SpO2   108/62 98 18 98.4 °F (36.9 °C) 98 %      MAP       --         Physical Exam    Nursing note and vitals reviewed.  Constitutional: She appears well-developed and well-nourished.   HENT:   Head: Normocephalic.   Mouth/Throat: Oropharynx is clear and moist.   Eyes: EOM are normal.   Neck: Normal range of motion.   Cardiovascular: Normal rate, regular rhythm, normal heart sounds, intact distal pulses and normal pulses. Exam reveals no friction rub and no decreased pulses.    No murmur heard.  Pulses:       Radial pulses are 2+ on the right side, and 2+ on the left side.        Dorsalis pedis pulses are 2+ on the right side, and 2+ on the left side.   Pulmonary/Chest: Breath sounds normal. No respiratory distress. She has no wheezes. She has no rales.   Abdominal: Soft. Bowel sounds are normal. She exhibits no distension.   Musculoskeletal: Normal range of motion. She exhibits tenderness.        Feet:    Neurological: She is alert.   Skin: Skin is warm and dry.   Psychiatric: She has a normal mood and affect.         ED Course   Procedures  Labs Reviewed - No data to display       Imaging Results          X-Ray Foot Complete Right (Final result)  Result time 07/11/19 19:02:24    Final result by Edward Guillen MD (07/11/19 19:02:24)                 Impression:      1. No acute displaced fracture or dislocation  2. Possible subtle cortical irregularity involving the distal aspect of the 4th metatarsal, correlation with any focal tenderness recommended.      Electronically  signed by: Edward Guillen MD  Date:    07/11/2019  Time:    19:02             Narrative:    EXAMINATION:  XR FOOT COMPLETE 3 VIEW RIGHT    CLINICAL HISTORY:  . Pain in right foot    TECHNIQUE:  AP, lateral, and oblique views of the right foot were performed.    COMPARISON:  None    FINDINGS:  Three views.    There is questionable subtle cortical irregularity involving the distal aspect of the 4th metatarsal versus positional effect.  Correlation with any focal tenderness in the region is advised.  No convincing significant overlying edema.  No dislocation.                                 Medical Decision Making:   Initial Assessment:   This an emergent evaluation a 23 old woman who presents to the emergency department with right foot pain.  Differential Diagnosis:   Musculoskeletal strain, sprain, fracture, dislocation  Clinical Tests:   Radiological Study: Reviewed  ED Management:  On physical examination, patient was in no acute distress.  Heart and lung sounds were within normal limits.  She had tenderness to palpation to the right lateral aspect of her foot.  I will obtain an x-ray.  Disposition is pending.    Vicky Lopes MD  6:33 PM  7/11/2019    Patient's x-ray returned with a subtle cortical irregularity of the 4th metatarsal.  However, her pain is over the 5th metatarsal.  I have discussed this with the patient and the importance of wearing a hard-soled shoe for the next 4-6 weeks.  Follow up with orthopedics or her primary care physician should her pain fail to resolve have also been advised.     Vicky Lopes MD  7:14 PM  7/11/2019                         Clinical Impression:       ICD-10-CM ICD-9-CM   1. Foot pain, right M79.671 729.5                                Vicky Lopes MD  07/11/19 1914

## 2019-10-07 ENCOUNTER — HOSPITAL ENCOUNTER (EMERGENCY)
Facility: HOSPITAL | Age: 24
Discharge: HOME | End: 2019-10-07
Attending: EMERGENCY MEDICINE
Payer: MEDICAID

## 2019-10-07 VITALS
TEMPERATURE: 99 F | HEIGHT: 60 IN | SYSTOLIC BLOOD PRESSURE: 109 MMHG | OXYGEN SATURATION: 98 % | DIASTOLIC BLOOD PRESSURE: 57 MMHG | WEIGHT: 180 LBS | RESPIRATION RATE: 16 BRPM | BODY MASS INDEX: 35.34 KG/M2 | HEART RATE: 82 BPM

## 2019-10-07 DIAGNOSIS — L50.9 URTICARIA: Primary | ICD-10-CM

## 2019-10-07 DIAGNOSIS — L29.9 PRURITIC CONDITION: ICD-10-CM

## 2019-10-07 PROCEDURE — 99282 EMERGENCY DEPT VISIT SF MDM: CPT

## 2019-10-07 PROCEDURE — 99284 PR EMERGENCY DEPT VISIT,LEVEL IV: ICD-10-PCS | Mod: ,,, | Performed by: PHYSICIAN ASSISTANT

## 2019-10-07 PROCEDURE — 99284 EMERGENCY DEPT VISIT MOD MDM: CPT | Mod: ,,, | Performed by: PHYSICIAN ASSISTANT

## 2019-10-07 RX ORDER — CETIRIZINE HYDROCHLORIDE 10 MG/1
10 TABLET ORAL DAILY
Qty: 30 TABLET | Refills: 0 | Status: SHIPPED | OUTPATIENT
Start: 2019-10-07 | End: 2021-09-15

## 2019-10-08 NOTE — ED NOTES
LOC: The patient is awake, alert, and oriented to place, time, situation. Affect is appropriate.  Speech is appropriate and clear.     APPEARANCE: Patient resting comfortably in no acute distress.  Patient is clean and well groomed.    SKIN: The skin is warm and dry; color consistent with ethnicity.  Patient has normal skin turgor and moist mucus membranes.  Skin intact; no breakdown or bruising noted. Large hives noted to the back of the neck and right upper back.    MUSCULOSKELETAL: Patient moving upper and lower extremities without difficulty.  Denies weakness.     RESPIRATORY: Airway is open and patent. Respirations spontaneous, even, easy, and non-labored.  Patient has a normal effort and rate.  No accessory muscle use noted. Denies cough.     CARDIAC:  Normal rate noted.  No peripheral edema noted. No complaints of chest pain.      ABDOMEN: Soft and non tender to palpation.  No distention noted.     NEUROLOGIC: Eyes open spontaneously.  Behavior appropriate to situation.  Follows commands; facial expression symmetrical.  Purposeful motor response noted; normal sensation in all extremities.

## 2019-10-08 NOTE — ED PROVIDER NOTES
"Encounter Date: 10/7/2019       History     Chief Complaint   Patient presents with    Rash     c/o pruritic, rasied, rash that comes and goes to back, back of legs, and back of neck.      The patient presents to the ER c/o having a persistent rash for the past 3-4 months. She describes the rash as "itchy whelps". She states that the rash comes and goes in different spots, but mostly to the back of her neck and to her back, occasionally to her legs. She denies any known allergen or trigger. She states that she did have mild lip swelling associated with it on one occasion several months ago, but no face or oral swelling now or recently. She denies any fever or chills with this. She states that she has been to several ER visits for this and even to a dermatology clinic and has had no resolution. She states that she was told it was lymph nodes and put on keflex, and then she was told be dermatology that it was "stress hives" and was prescribed Hydroxyzine. Neither treatment was effective for her. She denies any additional symptoms or concerns.          Review of patient's allergies indicates:   Allergen Reactions    Clindamycin Anaphylaxis and Hives     Past Medical History:   Diagnosis Date    Diabetes mellitus type I     Heart murmur     Sickle cell trait      Past Surgical History:   Procedure Laterality Date     SECTION       SECTION N/A 3/20/2019    Procedure:  SECTION;  Surgeon: Rudy Case MD;  Location: [facility] L&D OR;  Service: OB/GYN;  Laterality: N/A;    cyst removed on buttox  2011    DILATION AND CURETTAGE OF UTERUS  13    PILONIDAL CYST DRAINAGE  2014    TUBAL LIGATION       Family History   Problem Relation Age of Onset    Diabetes Paternal Grandmother     Hypertension Paternal Grandmother     Asthma Brother     Thyroid disease Mother     Congenital heart disease Son         hole in heart    Cardiomyopathy Neg Hx     Arrhythmia Neg Hx     Heart attacks " under age 50 Neg Hx     Pacemaker/defibrilator Neg Hx      Social History     Tobacco Use    Smoking status: Never Smoker    Smokeless tobacco: Never Used   Substance Use Topics    Alcohol use: No    Drug use: No     Review of Systems   Constitutional: Negative for chills and fever.   HENT: Negative for facial swelling, sore throat, trouble swallowing and voice change.    Eyes: Negative for discharge, redness, itching and visual disturbance.   Respiratory: Negative for chest tightness, shortness of breath, wheezing and stridor.    Cardiovascular: Negative for chest pain and palpitations.   Gastrointestinal: Negative for abdominal pain, diarrhea, nausea and vomiting.   Genitourinary: Negative for difficulty urinating and menstrual problem.   Musculoskeletal: Negative for arthralgias, myalgias and neck pain.   Skin: Positive for rash.   Neurological: Negative for dizziness, syncope, weakness, light-headedness and headaches.   Hematological: Negative for adenopathy.   Psychiatric/Behavioral: Negative for confusion.       Physical Exam     Initial Vitals [10/07/19 2007]   BP Pulse Resp Temp SpO2   (!) 109/57 82 16 98.6 °F (37 °C) 98 %      MAP       --         Physical Exam    Nursing note and vitals reviewed.  Constitutional: She appears well-developed and well-nourished. She is not diaphoretic. No distress.   HENT:   Head: Normocephalic.   Mouth/Throat: Oropharynx is clear and moist.   No angioedema. No swelling of face, lips, tongue, or throat. No adenopathy.    Eyes: Conjunctivae are normal. Right eye exhibits no discharge. Left eye exhibits no discharge.   Neck: Normal range of motion. Neck supple.   Cardiovascular: Normal rate and intact distal pulses.   Pulmonary/Chest: No respiratory distress. She has no wheezes.   Abdominal: Soft. There is no tenderness.   Musculoskeletal: Normal range of motion. She exhibits no edema.   Lymphadenopathy:     She has no cervical adenopathy.   Neurological: She is alert and  oriented to person, place, and time. She has normal strength.   Skin: Skin is warm and dry.   Urticaria/Wheals noted to posterior right neck and right middle back; non-tender. See image.     Psychiatric: She has a normal mood and affect.                 ED Course   Procedures  Labs Reviewed - No data to display       Imaging Results    None          Medical Decision Making:   History:   Old Medical Records: I decided to obtain old medical records.  Initial Assessment:   Itchy whelps to neck, back, and legs off and on for the past 4 months   Differential Diagnosis:   Allergic reaction, Acute urticaria, Chronic urticaria, Contact dermatitis, Hypersensitivity, Etc    ED Management:  Reviewed records   Discussed common allergens and possible causes - no antibiotics, food, jewelry, cosmetic products, or latex as obvious source.   Discussed acute and chronic urticaria   Will treat with H1 & H2 blockers   Steroids considered but not given due to her history of Type I DM   Ambulatory referral to allergy clinic provided   Pt advised to return to the ER of worse in any way                       Clinical Impression:       ICD-10-CM ICD-9-CM   1. Urticaria L50.9 708.9   2. Pruritic condition L29.9 698.9         Disposition:   Disposition: Discharged  Condition: Stable                        Jerson Bello PA-C  10/07/19 2057

## 2019-10-08 NOTE — ED TRIAGE NOTES
Pt to the ER with complaints of a recurring hives to the back and neck for the past three months.

## 2019-10-10 ENCOUNTER — SSC ENCOUNTER (OUTPATIENT)
Dept: ADMINISTRATIVE | Facility: OTHER | Age: 24
End: 2019-10-10

## 2019-10-10 NOTE — PROGRESS NOTES
Please note the following patient's information was forwarded to the Medicaid ([place] Medicaid,  [employer], [employer], [facility], [facility], [facility]) Case Management office.    Please see the media section for scanned image of documents sent to Medicaid.    Please contact Outpatient Complex Care Management at ext [phone] with any questions.    Thank you,    Penny Ivy, [facility]  Outpatient Case Mgmnt  [phone]

## 2020-02-19 ENCOUNTER — HOSPITAL ENCOUNTER (EMERGENCY)
Facility: HOSPITAL | Age: 25
Discharge: HOME | End: 2020-02-19
Attending: EMERGENCY MEDICINE
Payer: MEDICAID

## 2020-02-19 VITALS
HEART RATE: 74 BPM | BODY MASS INDEX: 35.34 KG/M2 | TEMPERATURE: 99 F | RESPIRATION RATE: 18 BRPM | OXYGEN SATURATION: 97 % | HEIGHT: 60 IN | SYSTOLIC BLOOD PRESSURE: 110 MMHG | DIASTOLIC BLOOD PRESSURE: 64 MMHG | WEIGHT: 180 LBS
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
## 2020-02-19 DIAGNOSIS — S16.1XXA CERVICAL MYOFASCIAL STRAIN, INITIAL ENCOUNTER: ICD-10-CM

## 2020-02-19 LAB
B-HCG UR QL: NEGATIVE
CTP QC/QA: YES

## 2020-02-19 PROCEDURE — 99284 EMERGENCY DEPT VISIT MOD MDM: CPT | Mod: 25

## 2020-02-19 PROCEDURE — 96372 THER/PROPH/DIAG INJ SC/IM: CPT

## 2020-02-19 PROCEDURE — 63600175 PHARM REV CODE 636 W HCPCS: Performed by: NURSE PRACTITIONER

## 2020-02-19 PROCEDURE — 81025 URINE PREGNANCY TEST: CPT | Performed by: NURSE PRACTITIONER

## 2020-02-19 RX ORDER — ORPHENADRINE CITRATE 30 MG/ML
60 INJECTION INTRAMUSCULAR; INTRAVENOUS
Status: COMPLETED | OUTPATIENT
Start: 2020-02-19 | End: 2020-02-19

## 2020-02-19 RX ORDER — NAPROXEN 500 MG/1
500 TABLET ORAL EVERY 12 HOURS PRN
Qty: 20 TABLET | Refills: 0 | Status: SHIPPED | OUTPATIENT
Start: 2020-02-19 | End: 2020-07-28

## 2020-02-19 RX ORDER — TIZANIDINE 2 MG/1
2 TABLET ORAL EVERY 8 HOURS PRN
Qty: 15 TABLET | Refills: 0 | Status: SHIPPED | OUTPATIENT
Start: 2020-02-19 | End: 2020-07-28

## 2020-02-19 RX ORDER — KETOROLAC TROMETHAMINE 30 MG/ML
30 INJECTION, SOLUTION INTRAMUSCULAR; INTRAVENOUS
Status: COMPLETED | OUTPATIENT
Start: 2020-02-19 | End: 2020-02-19

## 2020-02-19 RX ADMIN — KETOROLAC TROMETHAMINE 30 MG: 30 INJECTION, SOLUTION INTRAMUSCULAR at 07:02

## 2020-02-19 RX ADMIN — ORPHENADRINE CITRATE 60 MG: 30 INJECTION INTRAMUSCULAR; INTRAVENOUS at 07:02
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
## 2020-02-20 NOTE — DISCHARGE INSTRUCTIONS
Take medications as needed only as prescribed.  Do not drive or drink alcohol when taking muscle relaxers because they will make you drowsy.    Follow-up with your regular doctor or the clinic listed on your discharge paperwork.    Return to the emergency department for any new or worsening symptoms.    Thank you for coming to our Emergency Department today. It is important to remember that some problems are difficult to diagnose and may not be found during your first visit. Be sure to follow up with your primary care doctor.  If you do not have one, you may contact the one listed on your discharge paperwork or you may also call the [facility] Appointment Desk at [phone] to schedule an appointment with one.     Return to the ER with any questions/concerns, new/concerning symptoms, worsening or failure to improve. Do not drive or make any important decisions for 24 hours if you have received any pain medications, sedatives or mood altering drugs during your ER visit.

## 2020-03-30 ENCOUNTER — TELEPHONE (OUTPATIENT)
Dept: OBSTETRICS AND GYNECOLOGY | Facility: CLINIC | Age: 25
End: 2020-03-30

## 2020-03-30 NOTE — TELEPHONE ENCOUNTER
Called and inform pt that her appt will be cancelled due to Covid-19.  If she has issues/emergencies then to call office for an appt. alia

## 2020-07-28 ENCOUNTER — OFFICE VISIT (OUTPATIENT)
Dept: OBSTETRICS AND GYNECOLOGY | Facility: CLINIC | Age: 25
End: 2020-07-28
Payer: MEDICAID

## 2020-07-28 VITALS
SYSTOLIC BLOOD PRESSURE: 132 MMHG | BODY MASS INDEX: 35.93 KG/M2 | HEIGHT: 60 IN | TEMPERATURE: 98 F | DIASTOLIC BLOOD PRESSURE: 66 MMHG | WEIGHT: 183 LBS

## 2020-07-28 DIAGNOSIS — Z01.419 WELL WOMAN EXAM WITH ROUTINE GYNECOLOGICAL EXAM: Primary | ICD-10-CM

## 2020-07-28 PROCEDURE — 99999 PR PBB SHADOW E&M-EST. PATIENT-LVL III: ICD-10-PCS | Mod: PBBFAC,,, | Performed by: OBSTETRICS & GYNECOLOGY

## 2020-07-28 PROCEDURE — 88175 CYTOPATH C/V AUTO FLUID REDO: CPT

## 2020-07-28 PROCEDURE — 87491 CHLMYD TRACH DNA AMP PROBE: CPT

## 2020-07-28 PROCEDURE — 99395 PR PREVENTIVE VISIT,EST,18-39: ICD-10-PCS | Mod: S$PBB,,, | Performed by: OBSTETRICS & GYNECOLOGY

## 2020-07-28 PROCEDURE — 99395 PREV VISIT EST AGE 18-39: CPT | Mod: S$PBB,,, | Performed by: OBSTETRICS & GYNECOLOGY

## 2020-07-28 PROCEDURE — 99999 PR PBB SHADOW E&M-EST. PATIENT-LVL III: CPT | Mod: PBBFAC,,, | Performed by: OBSTETRICS & GYNECOLOGY

## 2020-07-28 PROCEDURE — 99213 OFFICE O/P EST LOW 20 MIN: CPT | Mod: PBBFAC | Performed by: OBSTETRICS & GYNECOLOGY

## 2020-07-28 RX ORDER — METFORMIN HYDROCHLORIDE 500 MG/1
TABLET, EXTENDED RELEASE ORAL
COMMUNITY
Start: 2020-07-18 | End: 2022-11-29

## 2020-07-28 RX ORDER — PEN NEEDLE, DIABETIC 30 GX3/16"
NEEDLE, DISPOSABLE MISCELLANEOUS
COMMUNITY
Start: 2018-09-25

## 2020-07-28 RX ORDER — GLUCAGON 1 MG/ML
1 KIT INJECTION
COMMUNITY
Start: 2020-07-28 | End: 2020-07-28

## 2020-07-28 RX ORDER — PEN NEEDLE, DIABETIC 30 GX3/16"
1 NEEDLE, DISPOSABLE MISCELLANEOUS
COMMUNITY
Start: 2019-11-19

## 2020-07-28 RX ORDER — PEN NEEDLE, DIABETIC 30 GX3/16"
1 NEEDLE, DISPOSABLE MISCELLANEOUS
COMMUNITY
Start: 2020-02-17 | End: 2020-08-15

## 2020-07-28 RX ORDER — INSULIN GLARGINE 100 [IU]/ML
INJECTION, SOLUTION SUBCUTANEOUS
COMMUNITY
Start: 2020-07-26

## 2020-07-28 RX ORDER — INSULIN LISPRO 100 [IU]/ML
INJECTION, SOLUTION INTRAVENOUS; SUBCUTANEOUS
COMMUNITY
Start: 2020-07-21

## 2020-07-28 RX ORDER — BLOOD-GLUCOSE CONTROL, NORMAL
EACH MISCELLANEOUS
COMMUNITY
Start: 2019-11-14

## 2020-07-28 NOTE — PROGRESS NOTES
Subjective:       Patient ID: Jay Schmid is a 24 y.o. female.    Chief Complaint:  Gynecologic Exam (Last pap was 16)      History of Present Illness  HPI  Annual Exam-Premenopausal  Patient presents for annual exam. The patient has no complaints today. The patient is sexually active. GYN screening history: last pap: approximate date 3/22/2016 and was normal. The patient wears seatbelts: yes. The patient participates in regular exercise: yes. Has the patient ever been transfused or tattooed?: yes. The patient reports that there is not domestic violence in her life.    Previous  sections x 2 with tubal ligation.      GYN & OB History  Patient's last menstrual period was 2020 (exact date).   Date of Last Pap: 3/24/2016    OB History    Para Term  AB Living   3 2 1 1 1 2   SAB TAB Ectopic Multiple Live Births     1   0 2      # Outcome Date GA Lbr Favian/2nd Weight Sex Delivery Anes PTL Lv   3  19 36w0d  2.73 kg (6 lb 0.3 oz) M CS-LTranv Spinal Y KARISSA      Complications: Hypertension affecting pregnancy in third trimester   2 Term 10/17/14 37w1d  2.999 kg (6 lb 9.8 oz) M CS-LTranv EPI  KARISSA   1 TAB 13             Past Medical History:   Diagnosis Date    Diabetes mellitus type I     Heart murmur     Sickle cell trait        Past Surgical History:   Procedure Laterality Date     SECTION       SECTION N/A 3/20/2019    Procedure:  SECTION;  Surgeon: Rudy Case MD;  Location: [facility] L&D OR;  Service: OB/GYN;  Laterality: N/A;    cyst removed on buttox  2011    DILATION AND CURETTAGE OF UTERUS  13    PILONIDAL CYST DRAINAGE      TUBAL LIGATION         Family History   Problem Relation Age of Onset    Diabetes Paternal Grandmother     Hypertension Paternal Grandmother     Asthma Brother     Thyroid disease Mother     Congenital heart disease Son         hole in heart    Cardiomyopathy Neg Hx     Arrhythmia Neg Hx      "Heart attacks under age 50 Neg Hx     Pacemaker/defibrilator Neg Hx        Social History     Socioeconomic History    Marital status: Single     Spouse name: Not on file    Number of children: Not on file    Years of education: Not on file    Highest education level: Not on file   Occupational History    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[employer]    Social Needs    Financial resource strain: Not on file    Food insecurity     Worry: Not on file     Inability: Not on file    Transportation needs     Medical: Not on file     Non-medical: Not on file   Tobacco Use    Smoking status: Never Smoker    Smokeless tobacco: Never Used   Substance and Sexual Activity    Alcohol use: No    Drug use: No    Sexual activity: Yes     Partners: Male     Birth control/protection: None   Lifestyle    Physical activity     Days per week: Not on file     Minutes per session: Not on file    Stress: Not on file   Relationships    Social connections     Talks on phone: Not on file     Gets together: Not on file     Attends Bahai service: Not on file     Active member of club or organization: Not on file     Attends meetings of clubs or organizations: Not on file     Relationship status: Not on file   Other Topics Concern    Not on file   Social History Narrative    No partner. In school, at [employer] for practical nursing.       Current Outpatient Medications   Medication Sig Dispense Refill    BD ULTRA-FINE GABRIELE PEN NEEDLE 32 gauge x 5/32" Ndle USE TO INJECT QID  6    cetirizine (ZYRTEC) 10 MG tablet Take 1 tablet (10 mg total) by mouth once daily. 30 tablet 0    glucagon HCL 1 mg/mL SolR Inject 1 mg into the vein.      HUMALOG KWIKPEN INSULIN 100 unit/mL pen INJECT 15 UNITS UNDER THE SKIN TID WITH PREMEAL DOSE CORRECTION- UP TO 55 UNITS DAILY      lancets (ONETOUCH DELICA PLUS LANCET) 30 gauge Misc 3 (three) times daily      LANTUS SOLOSTAR U-100 INSULIN glargine 100 units/mL (3mL) SubQ pen       metFORMIN " "(GLUCOPHAGE-XR) 500 MG XR 24hr tablet       pen needle, diabetic (BD ULTRA-FINE GABRIELE PEN NEEDLE) 32 gauge x 5/32" Ndle USE TO INJECT FOUR TIMES DAILY 100 each 11    pen needle, diabetic (BD ULTRA-FINE GABRIELE PEN NEEDLE) 32 gauge x 5/32" Ndle 1 strip.      pen needle, diabetic 32 gauge x 5/32" Ndle USE TO INJECT FOUR TIMES DAILY      pen needle, diabetic 32 gauge x 5/32" Ndle Inject 1 each into the skin.       No current facility-administered medications for this visit.        Review of patient's allergies indicates:   Allergen Reactions    Clindamycin Anaphylaxis and Hives       Review of Systems  Review of Systems   Constitutional: Negative for activity change, appetite change, chills, fatigue, fever and unexpected weight change.   HENT: Negative for mouth sores.    Respiratory: Negative for cough, shortness of breath and wheezing.    Cardiovascular: Negative for chest pain and palpitations.   Gastrointestinal: Negative for abdominal pain, bloating, blood in stool, constipation, nausea and vomiting.   Endocrine: Negative for diabetes and hot flashes.   Genitourinary: Negative for dysmenorrhea, dyspareunia, dysuria, frequency, hematuria, menorrhagia, menstrual problem, pelvic pain, urgency, vaginal bleeding, vaginal discharge, vaginal pain, urinary incontinence, postcoital bleeding and vaginal odor.   Musculoskeletal: Negative for back pain and myalgias.   Integumentary:  Negative for rash, breast mass and nipple discharge.   Neurological: Negative for seizures and headaches.   Psychiatric/Behavioral: Negative for depression and sleep disturbance. The patient is not nervous/anxious.    Breast: Negative for mass, mastodynia and nipple discharge          Objective:    Physical Exam:   Constitutional: She appears well-developed and well-nourished. No distress.   BMI of 35.7    HENT:   Head: Normocephalic and atraumatic.    Eyes: EOM are normal.    Neck: Normal range of motion.     Pulmonary/Chest: Effort normal. No " respiratory distress.   Breasts: Non-tender, no engorgement, no masses, no retraction, no discharge. Negative for lymphadenopathy.         Abdominal: Soft. She exhibits no distension. There is no abdominal tenderness. There is no rebound and no guarding.   Pfannenstiel scars     Genitourinary:    Vagina and uterus normal.      Genitourinary Comments: Vulva without any obvious lesions.  Urethral meatus normal size and location without any lesion.  Urethra is non-tender without stricture or discharge.  Bladder is non-tender.  Vaginal vault with good support.  Minimal white discharge noted.  No obvious lesion.  Normal rugation.  Cervix is without any cervical motion tenderness.  No obvious lesion.  Uterus is small, non-tender, normal contour.  Adnexa is without any masses or tenderness.  Perineum without obvious lesion.     negative for vaginal discharge          Musculoskeletal: Normal range of motion.       Neurological: She is alert.    Skin: Skin is warm and dry.    Psychiatric: She has a normal mood and affect.          Assessment:        1. Well woman exam with routine gynecological exam    2.  Type 1 diabetes mellitus        Plan:          I have discussed with the patient her condition.  Monthly breast examination was instructed, discussed, and encouraged.  Patient was encouraged to consume a low-calorie, low fat diet, and to increase of physical activity.  Healthy habits encouraged.  A Pap smear was performed without HR-HPV according to the USPSTF recommendations.  Mammogram was not ordered because of the combination of her age and risk factors, according to ACOG guidelines.  Gonorrhea and Chlamydia testing performed;  HIV test offered, again according to guidelines.  We also discussed her obstetric history and CV risks.   Patient is to continue her medications as prescribed.  She will come back to see me in one year for her annual visit.  She can come back to see me sooner as necessary.  All of her questions  were answered appropriately to her satisfaction.     We discussed better control of her diabetes with proper diet and exercise.

## 2020-08-03 LAB
C TRACH DNA SPEC QL NAA+PROBE: NOT DETECTED
N GONORRHOEA DNA SPEC QL NAA+PROBE: NOT DETECTED

## 2020-08-12 LAB
FINAL PATHOLOGIC DIAGNOSIS: NORMAL
Lab: NORMAL

## 2020-12-30 ENCOUNTER — OFFICE VISIT (OUTPATIENT)
Dept: OBSTETRICS AND GYNECOLOGY | Facility: CLINIC | Age: 25
End: 2020-12-30
Payer: MEDICAID

## 2020-12-30 VITALS
SYSTOLIC BLOOD PRESSURE: 120 MMHG | BODY MASS INDEX: 36.17 KG/M2 | DIASTOLIC BLOOD PRESSURE: 62 MMHG | WEIGHT: 185.19 LBS

## 2020-12-30 DIAGNOSIS — R21 SKIN RASH: Primary | ICD-10-CM

## 2020-12-30 PROCEDURE — 99999 PR PBB SHADOW E&M-EST. PATIENT-LVL III: ICD-10-PCS | Mod: PBBFAC,,, | Performed by: OBSTETRICS & GYNECOLOGY

## 2020-12-30 PROCEDURE — 99999 PR PBB SHADOW E&M-EST. PATIENT-LVL III: CPT | Mod: PBBFAC,,, | Performed by: OBSTETRICS & GYNECOLOGY

## 2020-12-30 PROCEDURE — 99213 PR OFFICE/OUTPT VISIT, EST, LEVL III, 20-29 MIN: ICD-10-PCS | Mod: S$PBB,,, | Performed by: OBSTETRICS & GYNECOLOGY

## 2020-12-30 PROCEDURE — 99213 OFFICE O/P EST LOW 20 MIN: CPT | Mod: S$PBB,,, | Performed by: OBSTETRICS & GYNECOLOGY

## 2020-12-30 PROCEDURE — 99213 OFFICE O/P EST LOW 20 MIN: CPT | Mod: PBBFAC | Performed by: OBSTETRICS & GYNECOLOGY

## 2020-12-31 NOTE — PROGRESS NOTES
"  Subjective:       Patient ID: Jay Schmid is a 25 y.o. female.    Chief Complaint:  Breast Pain      History of Present Illness  HPI  Patient comes in today complaining of having a rash over her right breast for the past week  It started lighter, then get darker, then it "went away"  Very light today.  No associated symptoms.  No pain/tenderness.  No drainage or discharge.    Patient with history of uncontrolled diabetes mellitus.  Better recently with improved compliance.  Has recently gained some weight.      GYN & OB History  Patient's last menstrual period was 2020 (exact date).   Date of Last Pap: 2020    OB History    Para Term  AB Living   3 2 1 1 1 2   SAB TAB Ectopic Multiple Live Births     1   0 2      # Outcome Date GA Lbr Favian/2nd Weight Sex Delivery Anes PTL Lv   3  19 36w0d  2.73 kg (6 lb 0.3 oz) M CS-LTranv Spinal Y KARISSA      Complications: Hypertension affecting pregnancy in third trimester   2 Term 10/17/14 37w1d  2.999 kg (6 lb 9.8 oz) M CS-LTranv EPI  KARISSA   1 TAB 13             Past Medical History:   Diagnosis Date    Diabetes mellitus type I     Heart murmur     Sickle cell trait        Past Surgical History:   Procedure Laterality Date     SECTION       SECTION N/A 3/20/2019    Procedure:  SECTION;  Surgeon: Rudy Case MD;  Location: [facility] L&D OR;  Service: OB/GYN;  Laterality: N/A;    cyst removed on buttox  2011    DILATION AND CURETTAGE OF UTERUS  13    PILONIDAL CYST DRAINAGE  2014    TUBAL LIGATION         Family History   Problem Relation Age of Onset    Diabetes Paternal Grandmother     Hypertension Paternal Grandmother     Asthma Brother     Thyroid disease Mother     Congenital heart disease Son         hole in heart    Cardiomyopathy Neg Hx     Arrhythmia Neg Hx     Heart attacks under age 50 Neg Hx     Pacemaker/defibrilator Neg Hx        Social History     Socioeconomic History    " "Marital status: Single     Spouse name: Not on file    Number of children: Not on file    Years of education: Not on file    Highest education level: Not on file   Occupational History    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[employer]    Social Needs    Financial resource strain: Not on file    Food insecurity     Worry: Not on file     Inability: Not on file    Transportation needs     Medical: Not on file     Non-medical: Not on file   Tobacco Use    Smoking status: Never Smoker    Smokeless tobacco: Never Used   Substance and Sexual Activity    Alcohol use: No    Drug use: No    Sexual activity: Yes     Partners: Male     Birth control/protection: None   Lifestyle    Physical activity     Days per week: Not on file     Minutes per session: Not on file    Stress: Not on file   Relationships    Social connections     Talks on phone: Not on file     Gets together: Not on file     Attends Mu-ism service: Not on file     Active member of club or organization: Not on file     Attends meetings of clubs or organizations: Not on file     Relationship status: Not on file   Other Topics Concern    Not on file   Social History Narrative    No partner. In school, at [employer] for practical nursing.       Current Outpatient Medications   Medication Sig Dispense Refill    BD ULTRA-FINE GABRIELE PEN NEEDLE 32 gauge x 5/32" Ndle USE TO INJECT QID  6    cetirizine (ZYRTEC) 10 MG tablet Take 1 tablet (10 mg total) by mouth once daily. 30 tablet 0    HUMALOG KWIKPEN INSULIN 100 unit/mL pen INJECT 15 UNITS UNDER THE SKIN TID WITH PREMEAL DOSE CORRECTION- UP TO 55 UNITS DAILY      lancets (ONETOUCH DELICA PLUS LANCET) 30 gauge Misc 3 (three) times daily      LANTUS SOLOSTAR U-100 INSULIN glargine 100 units/mL (3mL) SubQ pen       metFORMIN (GLUCOPHAGE-XR) 500 MG XR 24hr tablet       pen needle, diabetic (BD ULTRA-FINE GABRIELE PEN NEEDLE) 32 gauge x 5/32" Ndle USE TO INJECT FOUR TIMES DAILY 100 each 11    pen needle, " "diabetic (BD ULTRA-FINE GABRIELE PEN NEEDLE) 32 gauge x 5/32" Ndle 1 strip.      pen needle, diabetic 32 gauge x 5/32" Ndle USE TO INJECT FOUR TIMES DAILY       No current facility-administered medications for this visit.        Review of patient's allergies indicates:   Allergen Reactions    Clindamycin Anaphylaxis and Hives         Review of Systems  Review of Systems   Constitutional: Negative for activity change, appetite change, chills, fatigue, fever and unexpected weight change.   HENT: Negative for mouth sores.    Respiratory: Negative for cough, shortness of breath and wheezing.    Cardiovascular: Negative for chest pain and palpitations.   Gastrointestinal: Negative for abdominal pain, bloating, blood in stool, constipation, nausea and vomiting.   Endocrine: Negative for diabetes and hot flashes.   Genitourinary: Negative for dysmenorrhea, dyspareunia, dysuria, frequency, hematuria, menorrhagia, menstrual problem, pelvic pain, urgency, vaginal bleeding, vaginal discharge, vaginal pain, urinary incontinence, postcoital bleeding and vaginal odor.   Musculoskeletal: Negative for back pain and myalgias.   Integumentary:  Negative for rash, breast mass and nipple discharge.   Neurological: Negative for seizures and headaches.   Psychiatric/Behavioral: Negative for depression and sleep disturbance. The patient is not nervous/anxious.    Breast: Negative for mass, mastodynia and nipple discharge          Objective:    Physical Exam:   Constitutional: She appears well-developed and well-nourished. No distress.    HENT:   Head: Normocephalic and atraumatic.    Eyes: EOM are normal.    Neck: Normal range of motion.    Cardiovascular: Normal rate.     Pulmonary/Chest: Effort normal. No respiratory distress.   Breasts: Non-tender, no engorgement, no masses, no retraction, no discharge. Negative for lymphadenopathy.     Very faint elongated, hyperpigmented area with flat indistinct border at 0100 on right breast.    "     Abdominal: Soft. She exhibits no distension. There is no abdominal tenderness. There is no rebound and no guarding.             Musculoskeletal: Normal range of motion.       Neurological: She is alert.    Skin: Skin is warm and dry.    Psychiatric: She has a normal mood and affect.          Assessment:        1. Skin rash              Plan:      I have discussed with the patient regarding her condition.  Most likely, it was some sort of contact dermatitis.  It has already better.  Patient was reassured    She would like to discuss her weight.  Did not like the fact that she has been gaining some weight.  Diet and exercise discussed.  Healthy habits encouraged.    Back as needed

## 2021-04-16 ENCOUNTER — PATIENT MESSAGE (OUTPATIENT)
Dept: RESEARCH | Facility: HOSPITAL | Age: 26
End: 2021-04-16

## 2021-09-15 ENCOUNTER — OFFICE VISIT (OUTPATIENT)
Dept: OBSTETRICS AND GYNECOLOGY | Facility: CLINIC | Age: 26
End: 2021-09-15
Payer: MEDICAID

## 2021-09-15 ENCOUNTER — LAB VISIT (OUTPATIENT)
Dept: LAB | Facility: HOSPITAL | Age: 26
End: 2021-09-15
Attending: OBSTETRICS & GYNECOLOGY
Payer: MEDICAID

## 2021-09-15 VITALS
SYSTOLIC BLOOD PRESSURE: 122 MMHG | WEIGHT: 185.19 LBS | BODY MASS INDEX: 36.36 KG/M2 | DIASTOLIC BLOOD PRESSURE: 60 MMHG | HEIGHT: 60 IN

## 2021-09-15 DIAGNOSIS — Z01.419 WELL WOMAN EXAM WITH ROUTINE GYNECOLOGICAL EXAM: ICD-10-CM

## 2021-09-15 DIAGNOSIS — Z01.419 WELL WOMAN EXAM WITH ROUTINE GYNECOLOGICAL EXAM: Primary | ICD-10-CM

## 2021-09-15 PROCEDURE — 99213 OFFICE O/P EST LOW 20 MIN: CPT | Mod: PBBFAC | Performed by: OBSTETRICS & GYNECOLOGY

## 2021-09-15 PROCEDURE — 87491 CHLMYD TRACH DNA AMP PROBE: CPT | Performed by: OBSTETRICS & GYNECOLOGY

## 2021-09-15 PROCEDURE — 99999 PR PBB SHADOW E&M-EST. PATIENT-LVL III: ICD-10-PCS | Mod: PBBFAC,,, | Performed by: OBSTETRICS & GYNECOLOGY

## 2021-09-15 PROCEDURE — 99999 PR PBB SHADOW E&M-EST. PATIENT-LVL III: CPT | Mod: PBBFAC,,, | Performed by: OBSTETRICS & GYNECOLOGY

## 2021-09-15 PROCEDURE — 99395 PR PREVENTIVE VISIT,EST,18-39: ICD-10-PCS | Mod: S$PBB,,, | Performed by: OBSTETRICS & GYNECOLOGY

## 2021-09-15 PROCEDURE — 36415 COLL VENOUS BLD VENIPUNCTURE: CPT | Performed by: OBSTETRICS & GYNECOLOGY

## 2021-09-15 PROCEDURE — 87591 N.GONORRHOEAE DNA AMP PROB: CPT | Performed by: OBSTETRICS & GYNECOLOGY

## 2021-09-15 PROCEDURE — 99395 PREV VISIT EST AGE 18-39: CPT | Mod: S$PBB,,, | Performed by: OBSTETRICS & GYNECOLOGY

## 2021-09-15 PROCEDURE — 87389 HIV-1 AG W/HIV-1&-2 AB AG IA: CPT | Performed by: OBSTETRICS & GYNECOLOGY

## 2021-09-15 RX ORDER — BLOOD-GLUCOSE METER
EACH MISCELLANEOUS 3 TIMES DAILY
COMMUNITY
Start: 2021-08-04

## 2021-09-16 LAB — HIV 1+2 AB+HIV1 P24 AG SERPL QL IA: NEGATIVE

## 2021-09-17 LAB
C TRACH DNA SPEC QL NAA+PROBE: NOT DETECTED
N GONORRHOEA DNA SPEC QL NAA+PROBE: NOT DETECTED

## 2021-12-16 ENCOUNTER — HOSPITAL ENCOUNTER (EMERGENCY)
Facility: HOSPITAL | Age: 26
Discharge: HOME | End: 2021-12-16
Attending: EMERGENCY MEDICINE
Payer: MEDICAID

## 2021-12-16 VITALS
RESPIRATION RATE: 18 BRPM | DIASTOLIC BLOOD PRESSURE: 70 MMHG | HEART RATE: 101 BPM | OXYGEN SATURATION: 98 % | SYSTOLIC BLOOD PRESSURE: 143 MMHG | HEIGHT: 60 IN | WEIGHT: 180 LBS | TEMPERATURE: 98 F | BODY MASS INDEX: 35.34 KG/M2

## 2021-12-16 DIAGNOSIS — N30.90 CYSTITIS: Primary | ICD-10-CM

## 2021-12-16 DIAGNOSIS — R00.2 PALPITATIONS: ICD-10-CM

## 2021-12-16 LAB
ALBUMIN SERPL BCP-MCNC: 3.5 G/DL (ref 3.5–5.2)
ALP SERPL-CCNC: 80 U/L (ref 55–135)
ALT SERPL W/O P-5'-P-CCNC: 13 U/L (ref 10–44)
ANION GAP SERPL CALC-SCNC: 8 MMOL/L (ref 8–16)
AST SERPL-CCNC: 12 U/L (ref 10–40)
B-HCG UR QL: NEGATIVE
BACTERIA #/AREA URNS HPF: NORMAL /HPF
BASOPHILS # BLD AUTO: 0.03 K/UL (ref 0–0.2)
BASOPHILS NFR BLD: 0.3 % (ref 0–1.9)
BILIRUB SERPL-MCNC: 0.3 MG/DL (ref 0.1–1)
BILIRUB UR QL STRIP: NEGATIVE
BUN SERPL-MCNC: 9 MG/DL (ref 6–20)
CALCIUM SERPL-MCNC: 9.1 MG/DL (ref 8.7–10.5)
CHLORIDE SERPL-SCNC: 102 MMOL/L (ref 95–110)
CLARITY UR: CLEAR
CO2 SERPL-SCNC: 25 MMOL/L (ref 23–29)
COLOR UR: COLORLESS
CREAT SERPL-MCNC: 1 MG/DL (ref 0.5–1.4)
CTP QC/QA: YES
D DIMER PPP IA.FEU-MCNC: 0.45 MG/L FEU
DIFFERENTIAL METHOD: ABNORMAL
EOSINOPHIL # BLD AUTO: 0.3 K/UL (ref 0–0.5)
EOSINOPHIL NFR BLD: 2.6 % (ref 0–8)
ERYTHROCYTE [DISTWIDTH] IN BLOOD BY AUTOMATED COUNT: 12.4 % (ref 11.5–14.5)
EST. GFR  (AFRICAN AMERICAN): >60 ML/MIN/1.73 M^2
EST. GFR  (NON AFRICAN AMERICAN): >60 ML/MIN/1.73 M^2
GLUCOSE SERPL-MCNC: 253 MG/DL (ref 70–110)
GLUCOSE UR QL STRIP: ABNORMAL
HCT VFR BLD AUTO: 41.8 % (ref 37–48.5)
HGB BLD-MCNC: 14.4 G/DL (ref 12–16)
HGB UR QL STRIP: NEGATIVE
IMM GRANULOCYTES # BLD AUTO: 0.03 K/UL (ref 0–0.04)
IMM GRANULOCYTES NFR BLD AUTO: 0.3 % (ref 0–0.5)
KETONES UR QL STRIP: NEGATIVE
LEUKOCYTE ESTERASE UR QL STRIP: NEGATIVE
LYMPHOCYTES # BLD AUTO: 3.4 K/UL (ref 1–4.8)
LYMPHOCYTES NFR BLD: 29.8 % (ref 18–48)
MCH RBC QN AUTO: 28 PG (ref 27–31)
MCHC RBC AUTO-ENTMCNC: 34.4 G/DL (ref 32–36)
MCV RBC AUTO: 81 FL (ref 82–98)
MICROSCOPIC COMMENT: NORMAL
MONOCYTES # BLD AUTO: 0.5 K/UL (ref 0.3–1)
MONOCYTES NFR BLD: 4.3 % (ref 4–15)
NEUTROPHILS # BLD AUTO: 7.1 K/UL (ref 1.8–7.7)
NEUTROPHILS NFR BLD: 62.7 % (ref 38–73)
NITRITE UR QL STRIP: POSITIVE
NRBC BLD-RTO: 0 /100 WBC
PH UR STRIP: 7 [PH] (ref 5–8)
PLATELET # BLD AUTO: 342 K/UL (ref 150–450)
PMV BLD AUTO: 9.7 FL (ref 9.2–12.9)
POTASSIUM SERPL-SCNC: 4.3 MMOL/L (ref 3.5–5.1)
PROT SERPL-MCNC: 7.2 G/DL (ref 6–8.4)
PROT UR QL STRIP: NEGATIVE
RBC # BLD AUTO: 5.14 M/UL (ref 4–5.4)
SODIUM SERPL-SCNC: 135 MMOL/L (ref 136–145)
SP GR UR STRIP: 1 (ref 1–1.03)
TROPONIN I SERPL DL<=0.01 NG/ML-MCNC: <0.006 NG/ML (ref 0–0.03)
URN SPEC COLLECT METH UR: ABNORMAL
UROBILINOGEN UR STRIP-ACNC: NEGATIVE EU/DL
WBC # BLD AUTO: 11.28 K/UL (ref 3.9–12.7)
YEAST URNS QL MICRO: NORMAL

## 2021-12-16 PROCEDURE — 81025 URINE PREGNANCY TEST: CPT | Performed by: EMERGENCY MEDICINE

## 2021-12-16 PROCEDURE — 93010 ELECTROCARDIOGRAM REPORT: CPT | Mod: ,,, | Performed by: INTERNAL MEDICINE

## 2021-12-16 PROCEDURE — 93005 ELECTROCARDIOGRAM TRACING: CPT

## 2021-12-16 PROCEDURE — 25000003 PHARM REV CODE 250: Performed by: EMERGENCY MEDICINE

## 2021-12-16 PROCEDURE — 99285 EMERGENCY DEPT VISIT HI MDM: CPT | Mod: 25

## 2021-12-16 PROCEDURE — 81000 URINALYSIS NONAUTO W/SCOPE: CPT | Performed by: EMERGENCY MEDICINE

## 2021-12-16 PROCEDURE — 80053 COMPREHEN METABOLIC PANEL: CPT | Performed by: EMERGENCY MEDICINE

## 2021-12-16 PROCEDURE — 84484 ASSAY OF TROPONIN QUANT: CPT | Performed by: EMERGENCY MEDICINE

## 2021-12-16 PROCEDURE — 85025 COMPLETE CBC W/AUTO DIFF WBC: CPT | Performed by: EMERGENCY MEDICINE

## 2021-12-16 PROCEDURE — 85379 FIBRIN DEGRADATION QUANT: CPT | Performed by: EMERGENCY MEDICINE

## 2021-12-16 PROCEDURE — 93010 EKG 12-LEAD: ICD-10-PCS | Mod: ,,, | Performed by: INTERNAL MEDICINE

## 2021-12-16 RX ORDER — ONDANSETRON 4 MG/1
4 TABLET, ORALLY DISINTEGRATING ORAL EVERY 8 HOURS PRN
Qty: 20 TABLET | Refills: 0 | Status: SHIPPED | OUTPATIENT
Start: 2021-12-16

## 2021-12-16 RX ORDER — CEPHALEXIN 250 MG/1
500 CAPSULE ORAL
Status: COMPLETED | OUTPATIENT
Start: 2021-12-16 | End: 2021-12-16

## 2021-12-16 RX ORDER — CEPHALEXIN 500 MG/1
500 CAPSULE ORAL 2 TIMES DAILY
Qty: 10 CAPSULE | Refills: 0 | Status: SHIPPED | OUTPATIENT
Start: 2021-12-16 | End: 2021-12-21

## 2021-12-16 RX ADMIN — CEPHALEXIN 500 MG: 250 CAPSULE ORAL at 07:12

## 2022-01-24 ENCOUNTER — OFFICE VISIT (OUTPATIENT)
Dept: OBSTETRICS AND GYNECOLOGY | Facility: CLINIC | Age: 27
End: 2022-01-24
Payer: MEDICAID

## 2022-01-24 ENCOUNTER — LAB VISIT (OUTPATIENT)
Dept: LAB | Facility: HOSPITAL | Age: 27
End: 2022-01-24
Attending: OBSTETRICS & GYNECOLOGY
Payer: MEDICAID

## 2022-01-24 VITALS
WEIGHT: 188.94 LBS | SYSTOLIC BLOOD PRESSURE: 116 MMHG | HEIGHT: 60 IN | BODY MASS INDEX: 37.09 KG/M2 | DIASTOLIC BLOOD PRESSURE: 78 MMHG

## 2022-01-24 DIAGNOSIS — Z11.3 SCREEN FOR STD (SEXUALLY TRANSMITTED DISEASE): Primary | ICD-10-CM

## 2022-01-24 DIAGNOSIS — Z11.3 SCREEN FOR STD (SEXUALLY TRANSMITTED DISEASE): ICD-10-CM

## 2022-01-24 PROCEDURE — 3078F PR MOST RECENT DIASTOLIC BLOOD PRESSURE < 80 MM HG: ICD-10-PCS | Mod: CPTII,,, | Performed by: OBSTETRICS & GYNECOLOGY

## 2022-01-24 PROCEDURE — 87801 DETECT AGNT MULT DNA AMPLI: CPT | Performed by: OBSTETRICS & GYNECOLOGY

## 2022-01-24 PROCEDURE — 99999 PR PBB SHADOW E&M-EST. PATIENT-LVL II: ICD-10-PCS | Mod: PBBFAC,,, | Performed by: OBSTETRICS & GYNECOLOGY

## 2022-01-24 PROCEDURE — 87591 N.GONORRHOEAE DNA AMP PROB: CPT | Performed by: OBSTETRICS & GYNECOLOGY

## 2022-01-24 PROCEDURE — 87389 HIV-1 AG W/HIV-1&-2 AB AG IA: CPT | Performed by: OBSTETRICS & GYNECOLOGY

## 2022-01-24 PROCEDURE — 87491 CHLMYD TRACH DNA AMP PROBE: CPT | Performed by: OBSTETRICS & GYNECOLOGY

## 2022-01-24 PROCEDURE — 3074F PR MOST RECENT SYSTOLIC BLOOD PRESSURE < 130 MM HG: ICD-10-PCS | Mod: CPTII,,, | Performed by: OBSTETRICS & GYNECOLOGY

## 2022-01-24 PROCEDURE — 99212 OFFICE O/P EST SF 10 MIN: CPT | Mod: PBBFAC | Performed by: OBSTETRICS & GYNECOLOGY

## 2022-01-24 PROCEDURE — 3074F SYST BP LT 130 MM HG: CPT | Mod: CPTII,,, | Performed by: OBSTETRICS & GYNECOLOGY

## 2022-01-24 PROCEDURE — 3008F BODY MASS INDEX DOCD: CPT | Mod: CPTII,,, | Performed by: OBSTETRICS & GYNECOLOGY

## 2022-01-24 PROCEDURE — 99213 PR OFFICE/OUTPT VISIT, EST, LEVL III, 20-29 MIN: ICD-10-PCS | Mod: S$PBB,,, | Performed by: OBSTETRICS & GYNECOLOGY

## 2022-01-24 PROCEDURE — 3078F DIAST BP <80 MM HG: CPT | Mod: CPTII,,, | Performed by: OBSTETRICS & GYNECOLOGY

## 2022-01-24 PROCEDURE — 87481 CANDIDA DNA AMP PROBE: CPT | Mod: 59 | Performed by: OBSTETRICS & GYNECOLOGY

## 2022-01-24 PROCEDURE — 86803 HEPATITIS C AB TEST: CPT | Performed by: OBSTETRICS & GYNECOLOGY

## 2022-01-24 PROCEDURE — 99999 PR PBB SHADOW E&M-EST. PATIENT-LVL II: CPT | Mod: PBBFAC,,, | Performed by: OBSTETRICS & GYNECOLOGY

## 2022-01-24 PROCEDURE — 36415 COLL VENOUS BLD VENIPUNCTURE: CPT | Performed by: OBSTETRICS & GYNECOLOGY

## 2022-01-24 PROCEDURE — 3008F PR BODY MASS INDEX (BMI) DOCUMENTED: ICD-10-PCS | Mod: CPTII,,, | Performed by: OBSTETRICS & GYNECOLOGY

## 2022-01-24 PROCEDURE — 86592 SYPHILIS TEST NON-TREP QUAL: CPT | Performed by: OBSTETRICS & GYNECOLOGY

## 2022-01-24 PROCEDURE — 99213 OFFICE O/P EST LOW 20 MIN: CPT | Mod: S$PBB,,, | Performed by: OBSTETRICS & GYNECOLOGY

## 2022-01-25 LAB
HCV AB SERPL QL IA: NEGATIVE
HIV 1+2 AB+HIV1 P24 AG SERPL QL IA: NEGATIVE

## 2022-01-26 ENCOUNTER — TELEPHONE (OUTPATIENT)
Dept: OBSTETRICS AND GYNECOLOGY | Facility: HOSPITAL | Age: 27
End: 2022-01-26
Payer: MEDICAID

## 2022-01-26 DIAGNOSIS — N76.0 BACTERIAL VAGINOSIS: Primary | ICD-10-CM

## 2022-01-26 DIAGNOSIS — B37.31 YEAST VAGINITIS: ICD-10-CM

## 2022-01-26 DIAGNOSIS — B96.89 BACTERIAL VAGINOSIS: Primary | ICD-10-CM

## 2022-01-26 LAB
BACTERIAL VAGINOSIS DNA: POSITIVE
C TRACH DNA SPEC QL NAA+PROBE: NOT DETECTED
CANDIDA GLABRATA DNA: NEGATIVE
CANDIDA KRUSEI DNA: NEGATIVE
CANDIDA RRNA VAG QL PROBE: POSITIVE
N GONORRHOEA DNA SPEC QL NAA+PROBE: NOT DETECTED
RPR SER QL: NORMAL
T VAGINALIS RRNA GENITAL QL PROBE: NEGATIVE

## 2022-01-26 RX ORDER — FLUCONAZOLE 150 MG/1
150 TABLET ORAL DAILY
Qty: 1 TABLET | Refills: 0 | Status: SHIPPED | OUTPATIENT
Start: 2022-01-26 | End: 2022-01-27

## 2022-01-26 RX ORDER — METRONIDAZOLE 500 MG/1
500 TABLET ORAL EVERY 12 HOURS
Qty: 14 TABLET | Refills: 0 | Status: SHIPPED | OUTPATIENT
Start: 2022-01-26 | End: 2022-02-02

## 2022-01-31 NOTE — PROGRESS NOTES
Cc:  Vaginal discharge    HPI:  26 yro  who is up to date with gyn care presents with c/o vaginal discharge.  Pt admits some itching and odor.  Pt is also requesting full STD screen although pt states no change in sexual partner.  As of late, pt's diabetes control has been poor.    Vitals:    22 1441   BP: 116/78   Weight: 85.7 kg (188 lb 15 oz)   Height: 5' (1.524 m)     Physical Exam:   Constitutional: She is oriented to person, place, and time. She appears well-developed and well-nourished. No distress.    HENT:   Head: Normocephalic and atraumatic.       Pulmonary/Chest: Effort normal. No respiratory distress.        Abdominal: Soft. She exhibits no distension and no mass. There is no abdominal tenderness. There is no rebound and no guarding.     Genitourinary:    Uterus, right adnexa and left adnexa normal.      Pelvic exam was performed with patient supine.   The external female genitalia was normal.   No external genitalia lesions identified,Genitalia hair distrobution normal .   Labial bartholins normal.There is no rash, tenderness, lesion or injury on the right labia. There is no rash, tenderness, lesion or injury on the left labia. Cervix is normal. No no adexnal prolapse, no nodularity or no masses or organomegaly. Right adnexum displays no mass, no tenderness and no fullness. Left adnexum displays no mass, no tenderness and no fullness. There is vaginal discharge in the vagina. No erythema, tenderness, bleeding, rectocele, cystocele or unspecified prolapse of vaginal walls in the vagina.    No foreign body in the vagina.      No signs of injury in the vagina.   Vagina was moist.Cervix exhibits no motion tenderness, no lesion, no discharge, no friability, no lesion, no tenderness and no polyp. Uterus consistancy normal. and Uerus contour normal  Uterus is not deviated, not enlarged, not fixed, not tender, not hosting fibroids and no uterine prolapse. Normal urethral meatus.Urethral Meatus  exhibits: urethral lesion and prolapsedUrethra findings: no urethral mass, no tenderness and no urethral scarringBladder findings: no bladder distention and no bladder tenderness          Musculoskeletal: Normal range of motion and moves all extremeties. No tenderness.       Neurological: She is alert and oriented to person, place, and time. No cranial nerve deficit. Coordination normal.    Skin: She is not diaphoretic.    Psychiatric: She has a normal mood and affect. Her behavior is normal. Judgment and thought content normal.     Assessment/Plan  1. Screen for STD (sexually transmitted disease)  C. trachomatis/N. gonorrhoeae by AMP DNA    Vaginosis Screen by DNA Probe    HIV 1/2 Ag/Ab (4th Gen)    RPR    Hepatitis C Antibody

## 2022-06-28 ENCOUNTER — OFFICE VISIT (OUTPATIENT)
Dept: OBSTETRICS AND GYNECOLOGY | Facility: CLINIC | Age: 27
End: 2022-06-28
Payer: MEDICAID

## 2022-06-28 VITALS
BODY MASS INDEX: 36.44 KG/M2 | WEIGHT: 185.63 LBS | DIASTOLIC BLOOD PRESSURE: 76 MMHG | HEIGHT: 60 IN | SYSTOLIC BLOOD PRESSURE: 120 MMHG

## 2022-06-28 DIAGNOSIS — N89.8 VAGINAL DISCHARGE: Primary | ICD-10-CM

## 2022-06-28 DIAGNOSIS — E10.9 TYPE 1 DIABETES MELLITUS WITHOUT COMPLICATION: ICD-10-CM

## 2022-06-28 PROCEDURE — 3078F DIAST BP <80 MM HG: CPT | Mod: CPTII,,, | Performed by: OBSTETRICS & GYNECOLOGY

## 2022-06-28 PROCEDURE — 3074F PR MOST RECENT SYSTOLIC BLOOD PRESSURE < 130 MM HG: ICD-10-PCS | Mod: CPTII,,, | Performed by: OBSTETRICS & GYNECOLOGY

## 2022-06-28 PROCEDURE — 87801 DETECT AGNT MULT DNA AMPLI: CPT | Performed by: OBSTETRICS & GYNECOLOGY

## 2022-06-28 PROCEDURE — 99213 PR OFFICE/OUTPT VISIT, EST, LEVL III, 20-29 MIN: ICD-10-PCS | Mod: S$PBB,,, | Performed by: OBSTETRICS & GYNECOLOGY

## 2022-06-28 PROCEDURE — 87491 CHLMYD TRACH DNA AMP PROBE: CPT | Performed by: OBSTETRICS & GYNECOLOGY

## 2022-06-28 PROCEDURE — 1159F PR MEDICATION LIST DOCUMENTED IN MEDICAL RECORD: ICD-10-PCS | Mod: CPTII,,, | Performed by: OBSTETRICS & GYNECOLOGY

## 2022-06-28 PROCEDURE — 3074F SYST BP LT 130 MM HG: CPT | Mod: CPTII,,, | Performed by: OBSTETRICS & GYNECOLOGY

## 2022-06-28 PROCEDURE — 87481 CANDIDA DNA AMP PROBE: CPT | Mod: 59 | Performed by: OBSTETRICS & GYNECOLOGY

## 2022-06-28 PROCEDURE — 1160F RVW MEDS BY RX/DR IN RCRD: CPT | Mod: CPTII,,, | Performed by: OBSTETRICS & GYNECOLOGY

## 2022-06-28 PROCEDURE — 3008F PR BODY MASS INDEX (BMI) DOCUMENTED: ICD-10-PCS | Mod: CPTII,,, | Performed by: OBSTETRICS & GYNECOLOGY

## 2022-06-28 PROCEDURE — 3078F PR MOST RECENT DIASTOLIC BLOOD PRESSURE < 80 MM HG: ICD-10-PCS | Mod: CPTII,,, | Performed by: OBSTETRICS & GYNECOLOGY

## 2022-06-28 PROCEDURE — 99999 PR PBB SHADOW E&M-EST. PATIENT-LVL II: CPT | Mod: PBBFAC,,, | Performed by: OBSTETRICS & GYNECOLOGY

## 2022-06-28 PROCEDURE — 99999 PR PBB SHADOW E&M-EST. PATIENT-LVL II: ICD-10-PCS | Mod: PBBFAC,,, | Performed by: OBSTETRICS & GYNECOLOGY

## 2022-06-28 PROCEDURE — 3008F BODY MASS INDEX DOCD: CPT | Mod: CPTII,,, | Performed by: OBSTETRICS & GYNECOLOGY

## 2022-06-28 PROCEDURE — 99212 OFFICE O/P EST SF 10 MIN: CPT | Mod: PBBFAC | Performed by: OBSTETRICS & GYNECOLOGY

## 2022-06-28 PROCEDURE — 87591 N.GONORRHOEAE DNA AMP PROB: CPT | Mod: 59 | Performed by: OBSTETRICS & GYNECOLOGY

## 2022-06-28 PROCEDURE — 1159F MED LIST DOCD IN RCRD: CPT | Mod: CPTII,,, | Performed by: OBSTETRICS & GYNECOLOGY

## 2022-06-28 PROCEDURE — 1160F PR REVIEW ALL MEDS BY PRESCRIBER/CLIN PHARMACIST DOCUMENTED: ICD-10-PCS | Mod: CPTII,,, | Performed by: OBSTETRICS & GYNECOLOGY

## 2022-06-28 PROCEDURE — 99213 OFFICE O/P EST LOW 20 MIN: CPT | Mod: S$PBB,,, | Performed by: OBSTETRICS & GYNECOLOGY

## 2022-06-28 RX ORDER — METRONIDAZOLE 500 MG/1
500 TABLET ORAL EVERY 12 HOURS
Qty: 14 TABLET | Refills: 0 | Status: SHIPPED | OUTPATIENT
Start: 2022-06-28 | End: 2022-11-29 | Stop reason: SDUPTHER

## 2022-06-28 RX ORDER — DEXTROSE 4 G
1 TABLET,CHEWABLE ORAL
COMMUNITY
Start: 2021-12-01

## 2022-06-28 NOTE — PROGRESS NOTES
Subjective:       Patient ID: Jay Schmid is a 26 y.o. female.    Chief Complaint:  Vaginal Discharge (Pt is concern about vaginal discharge that will not go away. DP)      History of Present Illness  HPI  Patient comes in today, again complaining of vaginal discharge  Has had chronic vaginal discharge.  Wants to get tested again    Would like to get a referral to see Endocrinology to help manage her diabetes mellitus.      GYN & OB History  No LMP recorded.   Date of Last Pap: 2020    OB History    Para Term  AB Living   3 2 1 1 1 2   SAB IAB Ectopic Multiple Live Births     1   0 2      # Outcome Date GA Lbr Favian/2nd Weight Sex Delivery Anes PTL Lv   3  19 36w0d  2.73 kg (6 lb 0.3 oz) M CS-LTranv Spinal Y KARISSA      Complications: Hypertension affecting pregnancy in third trimester   2 Term 10/17/14 37w1d  2.999 kg (6 lb 9.8 oz) M CS-LTranv EPI  KARISSA   1 IAB 13             Past Medical History:   Diagnosis Date    Diabetes mellitus type I     Heart murmur     Sickle cell trait        Past Surgical History:   Procedure Laterality Date     SECTION       SECTION N/A 3/20/2019    Procedure:  SECTION;  Surgeon: Rudy Case MD;  Location: [facility] L&D OR;  Service: OB/GYN;  Laterality: N/A;    cyst removed on buttox  2011    DILATION AND CURETTAGE OF UTERUS  13    PILONIDAL CYST DRAINAGE      TUBAL LIGATION         Family History   Problem Relation Age of Onset    Diabetes Paternal Grandmother     Hypertension Paternal Grandmother     Asthma Brother     Thyroid disease Mother     Congenital heart disease Son         hole in heart    Cardiomyopathy Neg Hx     Arrhythmia Neg Hx     Heart attacks under age 50 Neg Hx     Pacemaker/defibrilator Neg Hx        Social History     Socioeconomic History    Marital status: Single   Occupational History    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[employer]    Tobacco Use    Smoking status: Never  "Smoker    Smokeless tobacco: Never Used   Substance and Sexual Activity    Alcohol use: No    Drug use: No    Sexual activity: Yes     Partners: Male     Birth control/protection: None   Social History Narrative    No partner. 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      BD ULTRA-FINE GABRIELE PEN NEEDLE 32 gauge x 5/32" Ndle USE TO INJECT QID  6    blood glucose strip-disp meter Kit Inject 1 Device into the skin.      blood sugar diagnostic (ONETOUCH VERIO TEST STRIPS) Strp USE TO TEST BG TID.      HUMALOG KWIKPEN INSULIN 100 unit/mL pen INJECT 15 UNITS UNDER THE SKIN TID WITH PREMEAL DOSE CORRECTION- UP TO 55 UNITS DAILY      lancets 30 gauge Misc 3 (three) times daily      LANTUS SOLOSTAR U-100 INSULIN glargine 100 units/mL (3mL) SubQ pen       metFORMIN (GLUCOPHAGE-XR) 500 MG XR 24hr tablet       ondansetron (ZOFRAN-ODT) 4 MG TbDL Take 1 tablet (4 mg total) by mouth every 8 (eight) hours as needed. 20 tablet 0    ONETOUCH VERIO TEST STRIPS Strp 3 (three) times daily.      pen needle, diabetic (BD ULTRA-FINE GABRIELE PEN NEEDLE) 32 gauge x 5/32" Ndle 1 strip.      pen needle, diabetic 32 gauge x 5/32" Ndle USE TO INJECT FOUR TIMES DAILY       No current facility-administered medications for this visit.       Review of patient's allergies indicates:   Allergen Reactions    Clindamycin Anaphylaxis and Hives         Review of Systems  Review of Systems   Constitutional: Negative for activity change, appetite change, chills, fatigue, fever and unexpected weight change.   HENT: Negative for mouth sores.    Respiratory: Negative for cough, shortness of breath and wheezing.    Cardiovascular: Negative for chest pain and palpitations.   Gastrointestinal: Negative for abdominal pain, bloating, blood in stool, constipation, nausea and vomiting.   Endocrine: Negative for diabetes and " hot flashes.   Genitourinary: Positive for vaginal discharge. Negative for dysmenorrhea, dyspareunia, dysuria, frequency, hematuria, menorrhagia, menstrual problem, pelvic pain, urgency, vaginal bleeding, vaginal pain, urinary incontinence, postcoital bleeding and vaginal odor.        Vulva irritation   Musculoskeletal: Negative for back pain and myalgias.   Integumentary:  Negative for rash, breast mass and nipple discharge.   Neurological: Negative for seizures and headaches.   Psychiatric/Behavioral: Negative for depression and sleep disturbance. The patient is not nervous/anxious.    Breast: Negative for mass, mastodynia and nipple discharge          Objective:    Physical Exam:   Constitutional: She appears well-developed and well-nourished. No distress.   BMI of 36.25    HENT:   Head: Normocephalic and atraumatic.    Eyes: EOM are normal.      Pulmonary/Chest: Effort normal. No respiratory distress.   Breasts: Non-tender, no engorgement, no masses, no retraction, no discharge. Negative for lymphadenopathy.         Abdominal: Soft. She exhibits no distension. There is no abdominal tenderness. There is no rebound and no guarding.   Pfannenstiel scar       Genitourinary:    Uterus normal.   There is vaginal discharge in the vagina.    Genitourinary Comments: Vulva without any obvious lesions.  Urethral meatus normal size and location without any lesion.  Urethra is non-tender without stricture or discharge.  Bladder is non-tender.  Vaginal vault with good support.  Minimal bloody discharge noted.  No obvious lesion.  Normal rugation.  Cervix is without any cervical motion tenderness.  No obvious lesion.  Uterus is small, non-tender, normal contour.  Adnexa is without any masses or tenderness.  Perineum without obvious lesion.               Musculoskeletal: Normal range of motion.       Neurological: She is alert.    Skin: Skin is warm and dry.    Psychiatric: She has a normal mood and affect.          Assessment:         1. Vaginal discharge    2. Type 1 diabetes mellitus without complication              Plan:      I have discussed with the patient regarding her condition.  GC, CT, vag screen  Metronidazole  Refer to Endocrinology per request    Back as needed

## 2022-06-30 LAB
C TRACH DNA SPEC QL NAA+PROBE: NOT DETECTED
N GONORRHOEA DNA SPEC QL NAA+PROBE: NOT DETECTED

## 2022-07-07 LAB
BACTERIAL VAGINOSIS DNA: POSITIVE
CANDIDA GLABRATA DNA: NEGATIVE
CANDIDA KRUSEI DNA: NEGATIVE
CANDIDA RRNA VAG QL PROBE: NEGATIVE
T VAGINALIS RRNA GENITAL QL PROBE: NEGATIVE

## 2022-10-03 ENCOUNTER — HOSPITAL ENCOUNTER (EMERGENCY)
Facility: HOSPITAL | Age: 27
Discharge: HOME | End: 2022-10-03
Attending: EMERGENCY MEDICINE
Payer: MEDICAID

## 2022-10-03 VITALS
RESPIRATION RATE: 18 BRPM | DIASTOLIC BLOOD PRESSURE: 74 MMHG | TEMPERATURE: 98 F | SYSTOLIC BLOOD PRESSURE: 125 MMHG | HEART RATE: 77 BPM | BODY MASS INDEX: 34.95 KG/M2 | WEIGHT: 178 LBS | HEIGHT: 60 IN | OXYGEN SATURATION: 100 %

## 2022-10-03 DIAGNOSIS — L02.91 ABSCESS: Primary | ICD-10-CM

## 2022-10-03 PROCEDURE — 99283 EMERGENCY DEPT VISIT LOW MDM: CPT | Mod: 25

## 2022-10-03 PROCEDURE — 25000003 PHARM REV CODE 250

## 2022-10-03 PROCEDURE — 10060 I&D ABSCESS SIMPLE/SINGLE: CPT

## 2022-10-03 RX ORDER — MUPIROCIN 20 MG/G
1 OINTMENT TOPICAL
Status: DISCONTINUED | OUTPATIENT
Start: 2022-10-03 | End: 2022-10-03 | Stop reason: HOSPADM

## 2022-10-03 RX ORDER — LIDOCAINE HYDROCHLORIDE 10 MG/ML
10 INJECTION INFILTRATION; PERINEURAL
Status: COMPLETED | OUTPATIENT
Start: 2022-10-03 | End: 2022-10-03

## 2022-10-03 RX ORDER — MUPIROCIN 20 MG/G
OINTMENT TOPICAL 3 TIMES DAILY
Qty: 1 G | Refills: 0 | Status: SHIPPED | OUTPATIENT
Start: 2022-10-03 | End: 2022-10-08

## 2022-10-03 RX ORDER — HYDROCODONE BITARTRATE AND ACETAMINOPHEN 5; 325 MG/1; MG/1
1 TABLET ORAL
Status: COMPLETED | OUTPATIENT
Start: 2022-10-03 | End: 2022-10-03

## 2022-10-03 RX ADMIN — HYDROCODONE BITARTRATE AND ACETAMINOPHEN 1 TABLET: 5; 325 TABLET ORAL at 07:10

## 2022-10-03 RX ADMIN — LIDOCAINE HYDROCHLORIDE 10 ML: 10 INJECTION, SOLUTION INFILTRATION; PERINEURAL at 07:10

## 2022-10-03 NOTE — Clinical Note
"Jay "Deisyisaac" Sharmaine was seen and treated in our emergency department on 10/3/2022.  She may return to work on 10/04/2022.       If you have any questions or concerns, please don't hesitate to call.      Zachary Quarles PA-C"

## 2022-10-04 NOTE — ED TRIAGE NOTES
Jay Schmid is a 27 y.o female to ED with  c/o abscess to left labia x5 days.  States that she previously had abscess to area 4 months ago that was drained.  Hx of DM type 1. Surg hx c-sec x2 and D&C

## 2022-10-04 NOTE — DISCHARGE INSTRUCTIONS

## 2022-10-04 NOTE — ED PROVIDER NOTES
Encounter Date: 10/3/2022       History     Chief Complaint   Patient presents with    Abscess     Pt c/o abscess to left labia x 3 days. Pt states this is a common occurrence on her body which requires drainage.     27-year-old female with no past medical history presents to ED complaining of a 4 day history of abscess to left inguinal region.  Patient denies any associated drainage.  Patient attempted Tylenol 11:00 a.m. this morning with no relief.  Patient reports frequent abscesses.  Patient denies any fever, chills, chest pain, shortness of breath, abdominal pain, nausea, vomiting, diarrhea.  No other symptoms reported.      The history is provided by the patient. No  was used.   Review of patient's allergies indicates:   Allergen Reactions    Clindamycin Anaphylaxis and Hives     Past Medical History:   Diagnosis Date    Diabetes mellitus type I     Heart murmur     Sickle cell trait      Past Surgical History:   Procedure Laterality Date     SECTION       SECTION N/A 3/20/2019    Procedure:  SECTION;  Surgeon: Rudy Case MD;  Location: [facility] L&D OR;  Service: OB/GYN;  Laterality: N/A;    cyst removed on buttox  2011    DILATION AND CURETTAGE OF UTERUS  13    PILONIDAL CYST DRAINAGE  2014    TUBAL LIGATION       Family History   Problem Relation Age of Onset    Diabetes Paternal Grandmother     Hypertension Paternal Grandmother     Asthma Brother     Thyroid disease Mother     Congenital heart disease Son         hole in heart    Cardiomyopathy Neg Hx     Arrhythmia Neg Hx     Heart attacks under age 50 Neg Hx     Pacemaker/defibrilator Neg Hx      Social History     Tobacco Use    Smoking status: Never    Smokeless tobacco: Never   Substance Use Topics    Alcohol use: No    Drug use: No     Review of Systems   Constitutional:  Negative for chills and fever.   HENT:  Negative for congestion, ear pain, rhinorrhea and sore throat.    Eyes:  Negative for  redness.   Respiratory:  Negative for cough and shortness of breath.    Cardiovascular:  Negative for chest pain.   Gastrointestinal:  Negative for abdominal pain, diarrhea, nausea and vomiting.   Genitourinary:  Negative for decreased urine volume, difficulty urinating, dysuria, frequency, hematuria and urgency.   Musculoskeletal:  Negative for back pain and neck pain.   Skin:  Negative for rash.        (+) abscess to L inguinal region   Neurological:  Negative for headaches.   Psychiatric/Behavioral:  Negative for confusion.      Physical Exam     Initial Vitals [10/03/22 1829]   BP Pulse Resp Temp SpO2   (!) 112/56 82 16 98.5 °F (36.9 °C) 100 %      MAP       --         Physical Exam    Nursing note and vitals reviewed.  Constitutional: She appears well-developed and well-nourished.  Non-toxic appearance. She does not appear ill.   HENT:   Head: Normocephalic and atraumatic.   Mouth/Throat: Mucous membranes are normal.   Eyes: Conjunctivae and EOM are normal.   Neck: Neck supple.   Normal range of motion.   Full passive range of motion without pain.     Cardiovascular:  Normal rate and regular rhythm.           Pulses:       Radial pulses are 2+ on the right side and 2+ on the left side.   Pulmonary/Chest: Effort normal and breath sounds normal. No respiratory distress.   Abdominal: Abdomen is soft. Bowel sounds are normal. She exhibits no distension. There is no abdominal tenderness. There is no rebound and no guarding.   Musculoskeletal:         General: Normal range of motion.      Cervical back: Full passive range of motion without pain, normal range of motion and neck supple. No rigidity.     Neurological: She is alert.   Skin: Skin is warm and dry.   3x4cm abscess to L inguinal fold. No evidence of any surrounding erythema or drainage.   Psychiatric: She has a normal mood and affect.       ED Course   I & D - Incision and Drainage    Date/Time: 10/3/2022 8:51 PM  Location procedure was performed: [facility]  EMERGENCY DEPARTMENT  Performed by: Zachary Quarles PA-C  Authorized by: Abida Marte MD   Type: abscess  Body area: anogenital (L inguinal fold)  Anesthesia: local infiltration    Anesthesia:  Local Anesthetic: lidocaine 1% without epinephrine  Scalpel size: 11  Incision type: single straight  Complexity: simple  Drainage: pus and serosanguinous  Drainage amount: copious  Wound treatment: incision, drainage, wound left open, deloculation and expression of material  Patient tolerance: Patient tolerated the procedure well with no immediate complications      Labs Reviewed - No data to display       Imaging Results    None          Medications   HYDROcodone-acetaminophen 5-325 mg per tablet 1 tablet (1 tablet Oral Given 10/3/22 1956)   LIDOcaine HCL 10 mg/ml (1%) injection 10 mL (10 mLs Infiltration Given 10/3/22 1956)     Medical Decision Making:   ED Management:  This is a 27-year-old female with no past medical history presents to ED complaining of a 4 day history of abscess to left inguinal region.  On physical exam, patient is well-appearing and in no acute distress.  Nontoxic appearing.  Lungs are clear to auscultation bilaterally.  Abdomen is soft and nontender.  No guarding, rigidity, rebound.  2+ radial pulses bilaterally.  3x4 cm abscess to left inguinal fold.  No evidence of any surrounding erythema or drainage.  I&D performed.  Copious pus and serosanguineous purulence expressed.  Patient tolerated the procedure well with no acute complications.  Abscess was de loculated.  No evidence of any erythema or surrounding cellulitis.  Bactroban ointment applied.  Dressing applied.  Will discharge patient on Bactroban ointment.  Urged prompt follow-up with PCP for further evaluation.    Strict return precautions given. I discussed with the patient/family the diagnosis, treatment plan, indications for return to the emergency department, and for expected follow-up. The patient/family verbalized an  understanding. The patient/family is asked if there are any questions or concerns. We discuss the case, until all issues are addressed to the patient/family's satisfaction. Patient/family understands and is agreeable to the plan. Patient is stable and ready for discharge.                          Clinical Impression:   Final diagnoses:  [L02.91] Abscess (Primary)      ED Disposition Condition    Discharge Stable          ED Prescriptions       Medication Sig Dispense Start Date End Date Auth. Provider    mupirocin (BACTROBAN) 2 % ointment Apply topically 3 (three) times daily. for 5 days 1 g 10/3/2022 10/8/2022 Zachary Quarles PA-C          Follow-up Information       Follow up With Specialties Details Why Contact Info    Merna Desouza, [facility] In 2 days for further evaluation [id number]  [place] [id number] [phone]      [facility] [place] Emergency Dept Emergency Medicine In 2 days If symptoms worsen [address]  [facility] [address] [phone]             Zachary Quarles PA-C  10/04/22 0049

## 2022-10-04 NOTE — ED NOTES
Pt's spouse returned to ED for paperwork. Spouse states that patient is in car.  Had spouse escorted to registration for discharge. Spouse refused medication states that patient has the medication at home.

## 2022-10-05 ENCOUNTER — OFFICE VISIT (OUTPATIENT)
Dept: OBSTETRICS AND GYNECOLOGY | Facility: CLINIC | Age: 27
End: 2022-10-05
Payer: MEDICAID

## 2022-10-05 VITALS
SYSTOLIC BLOOD PRESSURE: 114 MMHG | WEIGHT: 179.44 LBS | BODY MASS INDEX: 35.05 KG/M2 | DIASTOLIC BLOOD PRESSURE: 70 MMHG

## 2022-10-05 DIAGNOSIS — R19.09 INGUINAL MASS: Primary | ICD-10-CM

## 2022-10-05 DIAGNOSIS — E10.9 TYPE 1 DIABETES MELLITUS WITHOUT COMPLICATION: ICD-10-CM

## 2022-10-05 PROCEDURE — 1159F MED LIST DOCD IN RCRD: CPT | Mod: CPTII,,, | Performed by: PHYSICIAN ASSISTANT

## 2022-10-05 PROCEDURE — 99999 PR PBB SHADOW E&M-EST. PATIENT-LVL III: ICD-10-PCS | Mod: PBBFAC,,, | Performed by: PHYSICIAN ASSISTANT

## 2022-10-05 PROCEDURE — 3008F BODY MASS INDEX DOCD: CPT | Mod: CPTII,,, | Performed by: PHYSICIAN ASSISTANT

## 2022-10-05 PROCEDURE — 3078F PR MOST RECENT DIASTOLIC BLOOD PRESSURE < 80 MM HG: ICD-10-PCS | Mod: CPTII,,, | Performed by: PHYSICIAN ASSISTANT

## 2022-10-05 PROCEDURE — 3074F SYST BP LT 130 MM HG: CPT | Mod: CPTII,,, | Performed by: PHYSICIAN ASSISTANT

## 2022-10-05 PROCEDURE — 3078F DIAST BP <80 MM HG: CPT | Mod: CPTII,,, | Performed by: PHYSICIAN ASSISTANT

## 2022-10-05 PROCEDURE — 1159F PR MEDICATION LIST DOCUMENTED IN MEDICAL RECORD: ICD-10-PCS | Mod: CPTII,,, | Performed by: PHYSICIAN ASSISTANT

## 2022-10-05 PROCEDURE — 3074F PR MOST RECENT SYSTOLIC BLOOD PRESSURE < 130 MM HG: ICD-10-PCS | Mod: CPTII,,, | Performed by: PHYSICIAN ASSISTANT

## 2022-10-05 PROCEDURE — 99214 PR OFFICE/OUTPT VISIT, EST, LEVL IV, 30-39 MIN: ICD-10-PCS | Mod: S$PBB,,, | Performed by: PHYSICIAN ASSISTANT

## 2022-10-05 PROCEDURE — 99213 OFFICE O/P EST LOW 20 MIN: CPT | Mod: PBBFAC | Performed by: PHYSICIAN ASSISTANT

## 2022-10-05 PROCEDURE — 99999 PR PBB SHADOW E&M-EST. PATIENT-LVL III: CPT | Mod: PBBFAC,,, | Performed by: PHYSICIAN ASSISTANT

## 2022-10-05 PROCEDURE — 99214 OFFICE O/P EST MOD 30 MIN: CPT | Mod: S$PBB,,, | Performed by: PHYSICIAN ASSISTANT

## 2022-10-05 PROCEDURE — 3008F PR BODY MASS INDEX (BMI) DOCUMENTED: ICD-10-PCS | Mod: CPTII,,, | Performed by: PHYSICIAN ASSISTANT

## 2022-10-05 RX ORDER — SULFAMETHOXAZOLE AND TRIMETHOPRIM 800; 160 MG/1; MG/1
1 TABLET ORAL 2 TIMES DAILY
Qty: 20 TABLET | Refills: 0 | Status: SHIPPED | OUTPATIENT
Start: 2022-10-05 | End: 2022-10-15

## 2022-10-05 NOTE — PROGRESS NOTES
Subjective:       Patient ID: Jay Schmid is a 27 y.o. female.    Chief Complaint:  No chief complaint on file.      History of Present Illness  HPI  28 YO F PRESENTS FOR FOLLOW UP. She was seen in ER on 10/3. She had an abscess to the left inguinal region.  I&D performed. Pt discharged with bactroban. She states symptoms have not improved, swelling is getting worse. Denies fever. States she gets these in this area frequently. She has h/o DM-I, pilonidal cyst, SC disease, and heart murmur.     ER 10/3   27-year-old female with no past medical history presents to ED complaining of a 4 day history of abscess to left inguinal region.  Patient denies any associated drainage.  Patient attempted Tylenol 11:00 a.m. this morning with no relief.  Patient reports frequent abscesses.  Patient denies any fever, chills, chest pain, shortness of breath, abdominal pain, nausea, vomiting, diarrhea.  No other symptoms reported.      GYN & OB History  No LMP recorded.   Date of Last Pap: 2020    OB History    Para Term  AB Living   3 2 1 1 1 2   SAB IAB Ectopic Multiple Live Births     1   0 2      # Outcome Date GA Lbr Favian/2nd Weight Sex Delivery Anes PTL Lv   3  19 36w0d  2.73 kg (6 lb 0.3 oz) M CS-LTranv Spinal Y KARISSA      Complications: Hypertension affecting pregnancy in third trimester   2 Term 10/17/14 37w1d  2.999 kg (6 lb 9.8 oz) M CS-LTranv EPI  KARISSA   1 IAB 13               Review of Systems  Review of Systems   Constitutional:  Negative for chills and fever.   Respiratory:  Negative for shortness of breath.    Cardiovascular:  Negative for chest pain.   Gastrointestinal:  Negative for abdominal pain and vomiting.   Musculoskeletal:  Negative for back pain.   Integumentary:  Positive for mole/lesion.         Objective:    Physical Exam:   Constitutional: She appears well-developed and well-nourished.    HENT:   Head: Normocephalic and atraumatic.    Eyes: Pupils are equal, round,  and reactive to light. EOM are normal.      Pulmonary/Chest: Effort normal.        Abdominal: Soft. There is no abdominal tenderness.                  Skin: Skin is warm. Lesion noted. No rash, no abscess and no wound noted. No erythema.        4cm area of induration to left inguinal fold, with TTP and surrounding edema, no discharge, erythema, or fluctuance          Assessment:     1. Inguinal mass        2. Type 1 diabetes mellitus without complication                Plan:      Bactrim BID x 10 days. Keep area dry and clean. Sitz baths and warm compresses ok.   ER if not improved in 1-2 days or develop a fever   Teresa Miller PA-C

## 2022-11-29 ENCOUNTER — OFFICE VISIT (OUTPATIENT)
Dept: OBSTETRICS AND GYNECOLOGY | Facility: CLINIC | Age: 27
End: 2022-11-29
Payer: MEDICAID

## 2022-11-29 VITALS
HEIGHT: 60 IN | SYSTOLIC BLOOD PRESSURE: 112 MMHG | DIASTOLIC BLOOD PRESSURE: 62 MMHG | BODY MASS INDEX: 34.93 KG/M2 | WEIGHT: 177.94 LBS

## 2022-11-29 DIAGNOSIS — N89.8 VAGINAL DISCHARGE: ICD-10-CM

## 2022-11-29 DIAGNOSIS — Z01.419 WELL WOMAN EXAM WITH ROUTINE GYNECOLOGICAL EXAM: Primary | ICD-10-CM

## 2022-11-29 PROCEDURE — 87591 N.GONORRHOEAE DNA AMP PROB: CPT | Performed by: OBSTETRICS & GYNECOLOGY

## 2022-11-29 PROCEDURE — 3078F PR MOST RECENT DIASTOLIC BLOOD PRESSURE < 80 MM HG: ICD-10-PCS | Mod: CPTII,,, | Performed by: OBSTETRICS & GYNECOLOGY

## 2022-11-29 PROCEDURE — 99213 OFFICE O/P EST LOW 20 MIN: CPT | Mod: PBBFAC | Performed by: OBSTETRICS & GYNECOLOGY

## 2022-11-29 PROCEDURE — 1159F MED LIST DOCD IN RCRD: CPT | Mod: CPTII,,, | Performed by: OBSTETRICS & GYNECOLOGY

## 2022-11-29 PROCEDURE — 3074F PR MOST RECENT SYSTOLIC BLOOD PRESSURE < 130 MM HG: ICD-10-PCS | Mod: CPTII,,, | Performed by: OBSTETRICS & GYNECOLOGY

## 2022-11-29 PROCEDURE — 99999 PR PBB SHADOW E&M-EST. PATIENT-LVL III: CPT | Mod: PBBFAC,,, | Performed by: OBSTETRICS & GYNECOLOGY

## 2022-11-29 PROCEDURE — 1160F PR REVIEW ALL MEDS BY PRESCRIBER/CLIN PHARMACIST DOCUMENTED: ICD-10-PCS | Mod: CPTII,,, | Performed by: OBSTETRICS & GYNECOLOGY

## 2022-11-29 PROCEDURE — 99395 PR PREVENTIVE VISIT,EST,18-39: ICD-10-PCS | Mod: S$PBB,,, | Performed by: OBSTETRICS & GYNECOLOGY

## 2022-11-29 PROCEDURE — 3008F PR BODY MASS INDEX (BMI) DOCUMENTED: ICD-10-PCS | Mod: CPTII,,, | Performed by: OBSTETRICS & GYNECOLOGY

## 2022-11-29 PROCEDURE — 87491 CHLMYD TRACH DNA AMP PROBE: CPT | Performed by: OBSTETRICS & GYNECOLOGY

## 2022-11-29 PROCEDURE — 3078F DIAST BP <80 MM HG: CPT | Mod: CPTII,,, | Performed by: OBSTETRICS & GYNECOLOGY

## 2022-11-29 PROCEDURE — 3074F SYST BP LT 130 MM HG: CPT | Mod: CPTII,,, | Performed by: OBSTETRICS & GYNECOLOGY

## 2022-11-29 PROCEDURE — 99395 PREV VISIT EST AGE 18-39: CPT | Mod: S$PBB,,, | Performed by: OBSTETRICS & GYNECOLOGY

## 2022-11-29 PROCEDURE — 1160F RVW MEDS BY RX/DR IN RCRD: CPT | Mod: CPTII,,, | Performed by: OBSTETRICS & GYNECOLOGY

## 2022-11-29 PROCEDURE — 3008F BODY MASS INDEX DOCD: CPT | Mod: CPTII,,, | Performed by: OBSTETRICS & GYNECOLOGY

## 2022-11-29 PROCEDURE — 99999 PR PBB SHADOW E&M-EST. PATIENT-LVL III: ICD-10-PCS | Mod: PBBFAC,,, | Performed by: OBSTETRICS & GYNECOLOGY

## 2022-11-29 PROCEDURE — 1159F PR MEDICATION LIST DOCUMENTED IN MEDICAL RECORD: ICD-10-PCS | Mod: CPTII,,, | Performed by: OBSTETRICS & GYNECOLOGY

## 2022-11-29 PROCEDURE — 81514 NFCT DS BV&VAGINITIS DNA ALG: CPT | Performed by: OBSTETRICS & GYNECOLOGY

## 2022-11-29 RX ORDER — METRONIDAZOLE 500 MG/1
500 TABLET ORAL EVERY 12 HOURS
Qty: 14 TABLET | Refills: 0 | Status: SHIPPED | OUTPATIENT
Start: 2022-11-29 | End: 2023-12-13

## 2022-11-29 RX ORDER — ATORVASTATIN CALCIUM 10 MG/1
10 TABLET, FILM COATED ORAL
COMMUNITY
Start: 2022-09-14

## 2022-11-29 NOTE — PROGRESS NOTES
Subjective:       Patient ID: Jay Schmid is a 27 y.o. female.    Chief Complaint:  Well Woman (Last normal pap was 2020.)      History of Present Illness  HPI  Annual Exam-Premenopausal  Patient presents for annual exam. The patient has no complaints today. The patient is sexually active. GYN screening history: last pap: approximate date 2020 and was normal. The patient wears seatbelts: yes. The patient participates in regular exercise: yes. Has the patient ever been transfused or tattooed?:  No/Yes . The patient reports that there is not domestic violence in her life.    Status post  delivery x 2 with tubal ligation.  History of PIH  Long history of type 2 diabetes mellitus. Brittle.    Has had frequent vaginal discharge with odor.  Would like medication       GYN & OB History  Patient's last menstrual period was 2022.   Date of Last Pap: 2020    OB History    Para Term  AB Living   3 2 1 1 1 2   SAB IAB Ectopic Multiple Live Births     1   0 2      # Outcome Date GA Lbr Favian/2nd Weight Sex Delivery Anes PTL Lv   3  19 36w0d  2.73 kg (6 lb 0.3 oz) M CS-LTranv Spinal Y KARISSA      Complications: Hypertension affecting pregnancy in third trimester   2 Term 10/17/14 37w1d  2.999 kg (6 lb 9.8 oz) M CS-LTranv EPI  KARISSA   1 IAB 13             Past Medical History:   Diagnosis Date    Diabetes mellitus type I     Heart murmur     Sickle cell trait        Past Surgical History:   Procedure Laterality Date     SECTION       SECTION N/A 3/20/2019    Procedure:  SECTION;  Surgeon: Rudy Case MD;  Location: [facility] L&D OR;  Service: OB/GYN;  Laterality: N/A;    cyst removed on buttox  2011    DILATION AND CURETTAGE OF UTERUS  12    PILONIDAL CYST DRAINAGE  2014    TUBAL LIGATION         Family History   Problem Relation Age of Onset    Diabetes Paternal Grandmother     Hypertension Paternal Grandmother     Asthma Brother      "Thyroid disease Mother     Congenital heart disease Son         hole in heart    Cardiomyopathy Neg Hx     Arrhythmia Neg Hx     Heart attacks under age 50 Neg Hx     Pacemaker/defibrilator Neg Hx        Social History     Socioeconomic History    Marital status: Single   Occupational History    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[employer]    Tobacco Use    Smoking status: Never    Smokeless tobacco: Never   Substance and Sexual Activity    Alcohol use: No    Drug use: No    Sexual activity: Yes     Partners: Male     Birth control/protection: None   Social History Narrative    No partner. 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      BD ULTRA-FINE GABRIELE PEN NEEDLE 32 gauge x 5/32" Ndle USE TO INJECT QID  6    blood glucose strip-disp meter Kit Inject 1 Device into the skin.      blood sugar diagnostic Strp USE TO TEST BG TID.      blood-glucose meter Misc 1 Units by Other route.      HUMALOG KWIKPEN INSULIN 100 unit/mL pen INJECT 15 UNITS UNDER THE SKIN TID WITH PREMEAL DOSE CORRECTION- UP TO 55 UNITS DAILY      lancets 30 gauge Misc 3 (three) times daily      LANTUS SOLOSTAR U-100 INSULIN glargine 100 units/mL (3mL) SubQ pen       metroNIDAZOLE (FLAGYL) 500 MG tablet Take 1 tablet (500 mg total) by mouth every 12 (twelve) hours. 14 tablet 0    ondansetron (ZOFRAN-ODT) 4 MG TbDL Take 1 tablet (4 mg total) by mouth every 8 (eight) hours as needed. 20 tablet 0    ONETOUCH VERIO TEST STRIPS Strp 3 (three) times daily.      pen needle, diabetic 32 gauge x 5/32" Ndle USE TO INJECT FOUR TIMES DAILY      pen needle, diabetic 32 gauge x 5/32" Ndle 1 strip.       No current facility-administered medications for this visit.       Review of patient's allergies indicates:   Allergen Reactions    Clindamycin Anaphylaxis and Hives       Review of Systems  Review of Systems   Constitutional:  " Negative for activity change, appetite change, chills, fatigue, fever and unexpected weight change.   HENT:  Negative for mouth sores.    Respiratory:  Negative for cough, shortness of breath and wheezing.    Cardiovascular:  Negative for chest pain and palpitations.   Gastrointestinal:  Negative for abdominal pain, bloating, blood in stool, constipation, nausea and vomiting.   Endocrine: Negative for diabetes and hot flashes.   Genitourinary:  Positive for hot flashes, vaginal discharge and vaginal odor. Negative for dysmenorrhea, dyspareunia, dysuria, frequency, hematuria, menorrhagia, menstrual problem, pelvic pain, urgency, vaginal bleeding, vaginal pain, urinary incontinence and postcoital bleeding.   Musculoskeletal:  Negative for back pain and myalgias.   Integumentary:  Negative for rash, breast mass and nipple discharge.   Neurological:  Negative for seizures and headaches.   Psychiatric/Behavioral:  Negative for depression and sleep disturbance. The patient is not nervous/anxious.    Breast: Negative for mass, mastodynia and nipple discharge        Objective:    Physical Exam:   Constitutional: She appears well-developed and well-nourished. No distress.   BMI of 34.75    HENT:   Head: Normocephalic and atraumatic.    Eyes: EOM are normal.      Pulmonary/Chest: Effort normal. No respiratory distress.   Breasts: Non-tender, no engorgement, no masses, no retraction, no discharge. Negative for lymphadenopathy.         Abdominal: Soft. She exhibits no distension and no abdominal incision. There is no abdominal tenderness. There is no rebound and no guarding.   Pfannenstiel scar       Genitourinary:    Vagina and uterus normal.   No  no vaginal discharge in the vagina.    Genitourinary Comments: Vulva without any obvious lesions.  Urethral meatus normal size and location without any lesion.  Urethra is non-tender without stricture or discharge.  Bladder is non-tender.  Vaginal vault with good support.  Minimal  white discharge noted.  No obvious lesion.  Normal rugation.  Cervix is without any cervical motion tenderness.  No obvious lesion.  Uterus is small, non-tender, normal contour.  Adnexa is without any masses or tenderness.  Perineum without obvious lesion.               Musculoskeletal: Normal range of motion.       Neurological: She is alert.    Skin: Skin is warm and dry.    Psychiatric: She has a normal mood and affect.        Assessment:        1. Well woman exam with routine gynecological exam    2. Vaginal discharge              Plan:          I have discussed with the patient her condition.  Monthly breast examination was instructed, discussed, and encouraged.  Patient was encouraged to consume a low-calorie, low fat diet, and to increase of physical activity.  Healthy habits encouraged.  A Pap smear was not performed according to the USPSTF recommendations.  Mammogram was not ordered because of the combination of her age and risk factors, according to ACOG guidelines.  Gonorrhea and Chlamydia testing performed;  HIV test offered, again according to guidelines.  Vaginosis screen per request  Patient is to continue her medications as prescribed.    Metronidazole for history of recurrent BV  She will come back to see me in one year for her annual visit.  She can come back to see me sooner as necessary.  All of her questions were answered appropriately to her satisfaction.     She will continue to work with her PCP to better control of her diabetes.  Compliance stressed

## 2022-12-02 LAB
C TRACH DNA SPEC QL NAA+PROBE: NOT DETECTED
N GONORRHOEA DNA SPEC QL NAA+PROBE: NOT DETECTED

## 2023-09-12 NOTE — LACTATION NOTE
"   03/22/19 1005   Maternal Assessment   Breast Density Bilateral:;soft   Areola Bilateral:;elastic   Nipples Bilateral:;flat   Maternal Infant Feeding   Maternal Preparation breast care   Maternal Emotional State assist needed   Infant Positioning cradle   Pain with Feeding no   Latch Assistance yes   Equipment Type   Breast Pump Type double electric, hospital grade   Breast Pump Flange Type hard   Breast Pump Flange Size 27 mm   Breast Pumping   Breast Pumping Interventions post-feed pumping encouraged   Breast Pumping double electric breast pump utilized   mother with baby ready to try breastfeeding -baby sleepy after circumcision -aroused and baby latches with nipple shield -after much stimulation baby actively sucking and swallowing -reinforced with mother needs to feel strong sucking and hear swallows every feeding -states "this is the best baby has done " -plan pumping after breastfeeding for extra stimulation -encouraged call for any assistance   " Pharmacy and pt were advised.

## 2023-12-03 ENCOUNTER — HOSPITAL ENCOUNTER (EMERGENCY)
Facility: HOSPITAL | Age: 28
Discharge: HOME | End: 2023-12-03
Attending: STUDENT IN AN ORGANIZED HEALTH CARE EDUCATION/TRAINING PROGRAM
Payer: MEDICAID

## 2023-12-03 VITALS
HEART RATE: 88 BPM | DIASTOLIC BLOOD PRESSURE: 70 MMHG | HEIGHT: 60 IN | OXYGEN SATURATION: 99 % | SYSTOLIC BLOOD PRESSURE: 130 MMHG | BODY MASS INDEX: 36.71 KG/M2 | TEMPERATURE: 99 F | RESPIRATION RATE: 18 BRPM | WEIGHT: 187 LBS

## 2023-12-03 DIAGNOSIS — L02.91 ABSCESS: Primary | ICD-10-CM

## 2023-12-03 LAB
B-HCG UR QL: NEGATIVE
CTP QC/QA: YES

## 2023-12-03 PROCEDURE — 10060 I&D ABSCESS SIMPLE/SINGLE: CPT

## 2023-12-03 PROCEDURE — 99284 EMERGENCY DEPT VISIT MOD MDM: CPT | Mod: 25

## 2023-12-03 PROCEDURE — 81025 URINE PREGNANCY TEST: CPT

## 2023-12-03 RX ORDER — IBUPROFEN 800 MG/1
800 TABLET ORAL EVERY 8 HOURS PRN
Qty: 30 TABLET | Refills: 0 | Status: SHIPPED | OUTPATIENT
Start: 2023-12-03

## 2023-12-03 RX ORDER — LIDOCAINE HYDROCHLORIDE 10 MG/ML
1 INJECTION, SOLUTION EPIDURAL; INFILTRATION; INTRACAUDAL; PERINEURAL ONCE
Status: DISCONTINUED | OUTPATIENT
Start: 2023-12-03 | End: 2023-12-03 | Stop reason: HOSPADM

## 2023-12-03 RX ORDER — SULFAMETHOXAZOLE AND TRIMETHOPRIM 800; 160 MG/1; MG/1
1 TABLET ORAL 2 TIMES DAILY
Qty: 14 TABLET | Refills: 0 | Status: SHIPPED | OUTPATIENT
Start: 2023-12-03 | End: 2023-12-10

## 2023-12-03 NOTE — ED PROVIDER NOTES
"Encounter Date: 12/3/2023       History     Chief Complaint   Patient presents with    Abscess     Pt reports abscess to left groin since Friday. Pt denies drainage or fever/chills.     28-year-old female presents with painful swollen area to [facility].  Patient states "I suffer with them all the time" patient states that she is previously had them I and D. and feels that this has had a point of needing to be open.  Patient denies fevers, drainage or any other associated symptoms.      Review of patient's allergies indicates:   Allergen Reactions    Clindamycin Anaphylaxis and Hives     Past Medical History:   Diagnosis Date    Diabetes mellitus type I     Heart murmur     Sickle cell trait      Past Surgical History:   Procedure Laterality Date     SECTION       SECTION N/A 3/20/2019    Procedure:  SECTION;  Surgeon: Rudy Case MD;  Location: [facility] L&D OR;  Service: OB/GYN;  Laterality: N/A;    cyst removed on buttox  2011    DILATION AND CURETTAGE OF UTERUS  12    PILONIDAL CYST DRAINAGE  2014    TUBAL LIGATION       Family History   Problem Relation Age of Onset    Diabetes Paternal Grandmother     Hypertension Paternal Grandmother     Asthma Brother     Thyroid disease Mother     Congenital heart disease Son         hole in heart    Cardiomyopathy Neg Hx     Arrhythmia Neg Hx     Heart attacks under age 50 Neg Hx     Pacemaker/defibrilator Neg Hx      Social History     Tobacco Use    Smoking status: Never    Smokeless tobacco: Never   Substance Use Topics    Alcohol use: No    Drug use: No     Review of Systems   Constitutional:  Negative for fever.   HENT:  Negative for sore throat.    Respiratory:  Negative for shortness of breath.    Cardiovascular:  Negative for chest pain.   Gastrointestinal:  Negative for nausea.   Genitourinary:  Negative for dysuria.   Musculoskeletal:  Negative for back pain.   Skin:  Positive for color change. Negative for rash.   Neurological:  Negative " for weakness.   Hematological:  Does not bruise/bleed easily.       Physical Exam     Initial Vitals [12/03/23 1439]   BP Pulse Resp Temp SpO2   130/70 88 18 98.6 °F (37 °C) 99 %      MAP       --         Physical Exam    Nursing note and vitals reviewed.  Constitutional: She appears well-developed and well-nourished.   HENT:   Head: Normocephalic.   Eyes: Conjunctivae are normal.   Neck: Neck supple.   Normal range of motion.  Musculoskeletal:         General: Normal range of motion.      Cervical back: Normal range of motion and neck supple.     Neurological: She is alert and oriented to person, place, and time. GCS score is 15. GCS eye subscore is 4. GCS verbal subscore is 5. GCS motor subscore is 6.   Skin: Skin is warm and dry. Capillary refill takes less than 2 seconds. Abscess noted.   Left upper mons has fluctuant 3 cm tender area with surrounding erythema   Psychiatric: She has a normal mood and affect.         ED Course   I & D - Incision and Drainage    Date/Time: 12/3/2023 3:15 PM  Location procedure was performed: [facility] EMERGENCY DEPARTMENT    Performed by: Vika Her NP  Authorized by: Sukhdev Riddle MD  Consent Done: Yes  Consent: Verbal consent obtained.  Risks and benefits: risks, benefits and alternatives were discussed  Consent given by: patient  Type: abscess  Body area: anogenital  Location details: perineum  Anesthesia: local infiltration    Anesthesia:  Local Anesthetic: lidocaine 1% without epinephrine    Patient sedated: no  Scalpel size: 11  Incision type: single straight  Complexity: simple  Drainage: purulent  Drainage amount: moderate  Wound treatment: incision  Complications: No  Patient tolerance: Patient tolerated the procedure well with no immediate complications        Labs Reviewed   POCT URINE PREGNANCY          Imaging Results    None          Medications   LIDOcaine (PF) 10 mg/ml (1%) injection 10 mg (has no administration in time range)     Medical Decision  Making  Diagnosis management comments: This is an urgent evaluation of a  28-year-old female that presented to the ER with c/o abscess to [facility] . Pts exam was as above.     Labs were reviewed and discussed with pt.     Patient has been instructed to leave the packing in for 72 hours.  She is return to the ER at that time for packing removal if it is still present and recheck if there is no significant improvement.  She is been encouraged to continue with warm compresses and I will start her on Bactrim.  Patient is to follow-up with PCP as needed    Based on exam today - I have low suspicion for medical, surgical or other life threatening condition and I believe pt is safe for discharge and outpatient f/u.    Pt verbalizes understanding of d/c instructions and will return for worsening condition.                                            Clinical Impression:  Final diagnoses:  [L02.91] Abscess (Primary)          ED Disposition Condition    Discharge Stable          ED Prescriptions       Medication Sig Dispense Start Date End Date Auth. Provider    sulfamethoxazole-trimethoprim 800-160mg (BACTRIM DS) 800-160 mg Tab Take 1 tablet by mouth 2 (two) times daily. for 7 days 14 tablet 12/3/2023 12/10/2023 Vika Her NP    ibuprofen (ADVIL,MOTRIN) 800 MG tablet Take 1 tablet (800 mg total) by mouth every 8 (eight) hours as needed for Pain. 30 tablet 12/3/2023 -- Vika Her NP          Follow-up Information       Follow up With Specialties Details Why Contact Info    Merna Desouza NP Family Medicine Schedule an appointment as soon as possible for a visit   [address] [place] [address]  [phone]      [facility]  If symptoms worsen or any other concerns [address] [facility] [place] [id number] [phone]             Vika Her NP  12/03/23 9572

## 2023-12-13 ENCOUNTER — OFFICE VISIT (OUTPATIENT)
Dept: OBSTETRICS AND GYNECOLOGY | Facility: CLINIC | Age: 28
End: 2023-12-13
Payer: MEDICAID

## 2023-12-13 VITALS
BODY MASS INDEX: 36.79 KG/M2 | HEIGHT: 60 IN | WEIGHT: 187.38 LBS | SYSTOLIC BLOOD PRESSURE: 138 MMHG | DIASTOLIC BLOOD PRESSURE: 72 MMHG

## 2023-12-13 DIAGNOSIS — Z01.419 WELL WOMAN EXAM WITH ROUTINE GYNECOLOGICAL EXAM: Primary | ICD-10-CM

## 2023-12-13 DIAGNOSIS — E10.9 TYPE 1 DIABETES MELLITUS WITHOUT COMPLICATION: ICD-10-CM

## 2023-12-13 PROCEDURE — 99395 PREV VISIT EST AGE 18-39: CPT | Mod: S$PBB,,, | Performed by: OBSTETRICS & GYNECOLOGY

## 2023-12-13 PROCEDURE — 99395 PR PREVENTIVE VISIT,EST,18-39: ICD-10-PCS | Mod: S$PBB,,, | Performed by: OBSTETRICS & GYNECOLOGY

## 2023-12-13 PROCEDURE — 3008F BODY MASS INDEX DOCD: CPT | Mod: CPTII,,, | Performed by: OBSTETRICS & GYNECOLOGY

## 2023-12-13 PROCEDURE — 1160F RVW MEDS BY RX/DR IN RCRD: CPT | Mod: CPTII,,, | Performed by: OBSTETRICS & GYNECOLOGY

## 2023-12-13 PROCEDURE — 3078F DIAST BP <80 MM HG: CPT | Mod: CPTII,,, | Performed by: OBSTETRICS & GYNECOLOGY

## 2023-12-13 PROCEDURE — 3078F PR MOST RECENT DIASTOLIC BLOOD PRESSURE < 80 MM HG: ICD-10-PCS | Mod: CPTII,,, | Performed by: OBSTETRICS & GYNECOLOGY

## 2023-12-13 PROCEDURE — 99999 PR PBB SHADOW E&M-EST. PATIENT-LVL III: CPT | Mod: PBBFAC,,, | Performed by: OBSTETRICS & GYNECOLOGY

## 2023-12-13 PROCEDURE — 1159F MED LIST DOCD IN RCRD: CPT | Mod: CPTII,,, | Performed by: OBSTETRICS & GYNECOLOGY

## 2023-12-13 PROCEDURE — 3046F PR MOST RECENT HEMOGLOBIN A1C LEVEL > 9.0%: ICD-10-PCS | Mod: CPTII,,, | Performed by: OBSTETRICS & GYNECOLOGY

## 2023-12-13 PROCEDURE — 87491 CHLMYD TRACH DNA AMP PROBE: CPT | Performed by: OBSTETRICS & GYNECOLOGY

## 2023-12-13 PROCEDURE — 3075F SYST BP GE 130 - 139MM HG: CPT | Mod: CPTII,,, | Performed by: OBSTETRICS & GYNECOLOGY

## 2023-12-13 PROCEDURE — 3046F HEMOGLOBIN A1C LEVEL >9.0%: CPT | Mod: CPTII,,, | Performed by: OBSTETRICS & GYNECOLOGY

## 2023-12-13 PROCEDURE — 3075F PR MOST RECENT SYSTOLIC BLOOD PRESS GE 130-139MM HG: ICD-10-PCS | Mod: CPTII,,, | Performed by: OBSTETRICS & GYNECOLOGY

## 2023-12-13 PROCEDURE — 99999 PR PBB SHADOW E&M-EST. PATIENT-LVL III: ICD-10-PCS | Mod: PBBFAC,,, | Performed by: OBSTETRICS & GYNECOLOGY

## 2023-12-13 PROCEDURE — 1160F PR REVIEW ALL MEDS BY PRESCRIBER/CLIN PHARMACIST DOCUMENTED: ICD-10-PCS | Mod: CPTII,,, | Performed by: OBSTETRICS & GYNECOLOGY

## 2023-12-13 PROCEDURE — 3008F PR BODY MASS INDEX (BMI) DOCUMENTED: ICD-10-PCS | Mod: CPTII,,, | Performed by: OBSTETRICS & GYNECOLOGY

## 2023-12-13 PROCEDURE — 1159F PR MEDICATION LIST DOCUMENTED IN MEDICAL RECORD: ICD-10-PCS | Mod: CPTII,,, | Performed by: OBSTETRICS & GYNECOLOGY

## 2023-12-13 PROCEDURE — 87591 N.GONORRHOEAE DNA AMP PROB: CPT | Performed by: OBSTETRICS & GYNECOLOGY

## 2023-12-13 PROCEDURE — 88175 CYTOPATH C/V AUTO FLUID REDO: CPT | Performed by: OBSTETRICS & GYNECOLOGY

## 2023-12-13 PROCEDURE — 99213 OFFICE O/P EST LOW 20 MIN: CPT | Mod: PBBFAC | Performed by: OBSTETRICS & GYNECOLOGY

## 2023-12-14 NOTE — PROGRESS NOTES
Subjective:       Patient ID: Jay Schmid is a 28 y.o. female.    Chief Complaint:  Well Woman (Last normal pap was 2020.)        History of Present Illness  HPI  Annual Exam-Premenopausal  Patient presents for annual exam. The patient has no complaints today. The patient is sexually active. GYN screening history: last pap: approximate date 2020 and was normal. The patient wears seatbelts: yes. The patient participates in regular exercise: yes. Has the patient ever been transfused or tattooed?:  No/Yes . The patient reports that there is not domestic violence in her life.    Status post  delivery x 2 with tubal ligation.  History of PIH  Long history of type 1 diabetes mellitus. Brittle.  Sickle cell trait       GYN & OB History  Patient's last menstrual period was 2023 (exact date).   Date of Last Pap: 2023    OB History    Para Term  AB Living   3 2 1 1 1 2   SAB IAB Ectopic Multiple Live Births     1   0 2      # Outcome Date GA Lbr Favian/2nd Weight Sex Delivery Anes PTL Lv   3  19 36w0d  2.73 kg (6 lb 0.3 oz) M CS-LTranv Spinal Y KARISSA      Complications: Hypertension affecting pregnancy in third trimester   2 Term 10/17/14 37w1d  2.999 kg (6 lb 9.8 oz) M CS-LTranv EPI  KARISSA   1 IAB 13             Past Medical History:   Diagnosis Date    Diabetes mellitus type I     Heart murmur     Sickle cell trait        Past Surgical History:   Procedure Laterality Date     SECTION       SECTION N/A 3/20/2019    Procedure:  SECTION;  Surgeon: Rudy Case MD;  Location: [facility] L&D OR;  Service: OB/GYN;  Laterality: N/A;    cyst removed on buttox  2011    DILATION AND CURETTAGE OF UTERUS  12    PILONIDAL CYST DRAINAGE  2014    TUBAL LIGATION         Family History   Problem Relation Age of Onset    Diabetes Paternal Grandmother     Hypertension Paternal Grandmother     Asthma Brother     Thyroid disease Mother     Congenital  "heart disease Son         hole in heart    Cardiomyopathy Neg Hx     Arrhythmia Neg Hx     Heart attacks under age 50 Neg Hx     Pacemaker/defibrilator Neg Hx        Social History     Socioeconomic History    Marital status: Single   Occupational History    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[employer]    Tobacco Use    Smoking status: Never    Smokeless tobacco: Never   Substance and Sexual Activity    Alcohol use: No    Drug use: No    Sexual activity: Yes     Partners: Male     Birth control/protection: None   Social History Narrative    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      BD ULTRA-FINE GABRIELE PEN NEEDLE 32 gauge x 5/32" Ndle USE TO INJECT QID  6    blood glucose strip-disp meter Kit Inject 1 Device into the skin.      blood sugar diagnostic Strp USE TO TEST BG TID.      blood-glucose meter Misc 1 Units by Other route.      HUMALOG KWIKPEN INSULIN 100 unit/mL pen INJECT 15 UNITS UNDER THE SKIN TID WITH PREMEAL DOSE CORRECTION- UP TO 55 UNITS DAILY      ibuprofen (ADVIL,MOTRIN) 800 MG tablet Take 1 tablet (800 mg total) by mouth every 8 (eight) hours as needed for Pain. 30 tablet 0    lancets 30 gauge Misc 3 (three) times daily      LANTUS SOLOSTAR U-100 INSULIN glargine 100 units/mL (3mL) SubQ pen       ondansetron (ZOFRAN-ODT) 4 MG TbDL Take 1 tablet (4 mg total) by mouth every 8 (eight) hours as needed. 20 tablet 0    ONETOUCH VERIO TEST STRIPS Strp 3 (three) times daily.      pen needle, diabetic 32 gauge x 5/32" Ndle USE TO INJECT FOUR TIMES DAILY      pen needle, diabetic 32 gauge x 5/32" Ndle 1 strip.       No current facility-administered medications for this visit.       Review of patient's allergies indicates:   Allergen Reactions    Clindamycin Anaphylaxis and Hives       Review of Systems  Review of Systems   Constitutional:  Negative " for activity change, appetite change, chills, fatigue, fever and unexpected weight change.   HENT:  Negative for mouth sores.    Respiratory:  Negative for cough, shortness of breath and wheezing.    Cardiovascular:  Negative for chest pain and palpitations.   Gastrointestinal:  Negative for abdominal pain, bloating, blood in stool, constipation, nausea and vomiting.   Endocrine: Negative for diabetes and hot flashes.   Genitourinary:  Positive for vaginal discharge and vaginal odor. Negative for dysmenorrhea, dyspareunia, dysuria, frequency, hematuria, hot flashes, menorrhagia, menstrual problem, pelvic pain, urgency, vaginal bleeding, vaginal pain, urinary incontinence and postcoital bleeding.   Musculoskeletal:  Negative for back pain and myalgias.   Integumentary:  Negative for rash, breast mass and nipple discharge.   Neurological:  Negative for seizures and headaches.   Psychiatric/Behavioral:  Negative for depression and sleep disturbance. The patient is not nervous/anxious.    Breast: Negative for mass, mastodynia and nipple discharge          Objective:    Physical Exam:   Constitutional: She appears well-developed and well-nourished. No distress.   BMI of 36.60    HENT:   Head: Normocephalic and atraumatic.    Eyes: EOM are normal.      Pulmonary/Chest: Effort normal. No respiratory distress.   Breasts: Non-tender, no engorgement, no masses, no retraction, no discharge. Negative for lymphadenopathy.         Abdominal: Soft. She exhibits no distension and no abdominal incision. There is no abdominal tenderness. There is no rebound and no guarding.   Pfannenstiel scar       Genitourinary:    Vagina and uterus normal.   No vaginal discharge in the vagina.    Genitourinary Comments: Vulva without any obvious lesions.  Urethral meatus normal size and location without any lesion.  Urethra is non-tender without stricture or discharge.  Bladder is non-tender.  Vaginal vault with good support.  Minimal white  discharge noted.  No obvious lesion.  Normal rugation.  Cervix is without any cervical motion tenderness.  No obvious lesion.  Uterus is small, non-tender, normal contour.  Adnexa is without any masses or tenderness.  Perineum without obvious lesion.               Musculoskeletal: Normal range of motion.       Neurological: She is alert.    Skin: Skin is warm and dry.    Psychiatric: She has a normal mood and affect.          Assessment:        1. Well woman exam with routine gynecological exam    2. Type 1 diabetes mellitus without complication              Plan:          I have discussed with the patient her condition.  Monthly breast examination was instructed, discussed, and encouraged.  Patient was encouraged to consume a low-calorie, low fat diet, and to increase of physical activity.  Healthy habits encouraged.  A Pap smear was performed without HRHPV according to the USPSTF recommendations.  Mammogram was not ordered because of the combination of her age and risk factors, according to ACOG guidelines.  Gonorrhea and Chlamydia testing performed;  HIV test offered, again according to guidelines.  Patient is to continue her medications as prescribed.      She will come back to see me in one year for her annual visit.  She can come back to see me sooner as necessary.  All of her questions were answered appropriately to her satisfaction.     She will continue to work with her PCP to better control of her diabetes.

## 2023-12-16 LAB
C TRACH DNA SPEC QL NAA+PROBE: DETECTED
N GONORRHOEA DNA SPEC QL NAA+PROBE: NOT DETECTED

## 2023-12-17 ENCOUNTER — PATIENT MESSAGE (OUTPATIENT)
Dept: OBSTETRICS AND GYNECOLOGY | Facility: CLINIC | Age: 28
End: 2023-12-17
Payer: MEDICAID

## 2023-12-17 DIAGNOSIS — A56.09 CHLAMYDIAL CERVICITIS: Primary | ICD-10-CM

## 2023-12-17 RX ORDER — AZITHROMYCIN 500 MG/1
1000 TABLET, FILM COATED ORAL ONCE
Qty: 2 TABLET | Refills: 0 | Status: SHIPPED | OUTPATIENT
Start: 2023-12-17 | End: 2023-12-17

## 2023-12-18 ENCOUNTER — PATIENT MESSAGE (OUTPATIENT)
Dept: OBSTETRICS AND GYNECOLOGY | Facility: CLINIC | Age: 28
End: 2023-12-18
Payer: MEDICAID

## 2024-01-02 LAB
FINAL PATHOLOGIC DIAGNOSIS: NORMAL
Lab: NORMAL

## 2024-03-21 NOTE — ED NOTES
MRI brain WO contrast  EEG  Iron studies  Vitamin B12, folate  Vitamin B6 level  Vitamin D level  Call with any new symptoms or concerns.   Follow up in 6 weeks.   Pt states she is unable to urinate at this time

## 2024-12-16 ENCOUNTER — OFFICE VISIT (OUTPATIENT)
Dept: OBSTETRICS AND GYNECOLOGY | Facility: CLINIC | Age: 29
End: 2024-12-16
Payer: MEDICAID

## 2024-12-16 ENCOUNTER — LAB VISIT (OUTPATIENT)
Dept: LAB | Facility: HOSPITAL | Age: 29
End: 2024-12-16
Attending: OBSTETRICS & GYNECOLOGY
Payer: MEDICAID

## 2024-12-16 VITALS
WEIGHT: 185.19 LBS | DIASTOLIC BLOOD PRESSURE: 70 MMHG | BODY MASS INDEX: 36.36 KG/M2 | HEIGHT: 60 IN | SYSTOLIC BLOOD PRESSURE: 120 MMHG

## 2024-12-16 DIAGNOSIS — E10.9 TYPE 1 DIABETES MELLITUS WITHOUT COMPLICATION: ICD-10-CM

## 2024-12-16 DIAGNOSIS — Z01.419 WELL WOMAN EXAM WITH ROUTINE GYNECOLOGICAL EXAM: Primary | ICD-10-CM

## 2024-12-16 DIAGNOSIS — Z01.419 WELL WOMAN EXAM WITH ROUTINE GYNECOLOGICAL EXAM: ICD-10-CM

## 2024-12-16 LAB — HIV 1+2 AB+HIV1 P24 AG SERPL QL IA: NORMAL

## 2024-12-16 PROCEDURE — 36415 COLL VENOUS BLD VENIPUNCTURE: CPT | Performed by: OBSTETRICS & GYNECOLOGY

## 2024-12-16 PROCEDURE — 3074F SYST BP LT 130 MM HG: CPT | Mod: CPTII,,, | Performed by: OBSTETRICS & GYNECOLOGY

## 2024-12-16 PROCEDURE — 87591 N.GONORRHOEAE DNA AMP PROB: CPT | Performed by: OBSTETRICS & GYNECOLOGY

## 2024-12-16 PROCEDURE — 87389 HIV-1 AG W/HIV-1&-2 AB AG IA: CPT | Performed by: OBSTETRICS & GYNECOLOGY

## 2024-12-16 PROCEDURE — 3008F BODY MASS INDEX DOCD: CPT | Mod: CPTII,,, | Performed by: OBSTETRICS & GYNECOLOGY

## 2024-12-16 PROCEDURE — 99213 OFFICE O/P EST LOW 20 MIN: CPT | Mod: PBBFAC | Performed by: OBSTETRICS & GYNECOLOGY

## 2024-12-16 PROCEDURE — 99395 PREV VISIT EST AGE 18-39: CPT | Mod: S$PBB,,, | Performed by: OBSTETRICS & GYNECOLOGY

## 2024-12-16 PROCEDURE — 3078F DIAST BP <80 MM HG: CPT | Mod: CPTII,,, | Performed by: OBSTETRICS & GYNECOLOGY

## 2024-12-16 PROCEDURE — 3051F HG A1C>EQUAL 7.0%<8.0%: CPT | Mod: CPTII,,, | Performed by: OBSTETRICS & GYNECOLOGY

## 2024-12-16 PROCEDURE — 1159F MED LIST DOCD IN RCRD: CPT | Mod: CPTII,,, | Performed by: OBSTETRICS & GYNECOLOGY

## 2024-12-16 PROCEDURE — 1160F RVW MEDS BY RX/DR IN RCRD: CPT | Mod: CPTII,,, | Performed by: OBSTETRICS & GYNECOLOGY

## 2024-12-16 PROCEDURE — 99999 PR PBB SHADOW E&M-EST. PATIENT-LVL III: CPT | Mod: PBBFAC,,, | Performed by: OBSTETRICS & GYNECOLOGY

## 2024-12-16 RX ORDER — SEMAGLUTIDE 0.68 MG/ML
0.5 INJECTION, SOLUTION SUBCUTANEOUS
COMMUNITY

## 2024-12-16 NOTE — PROGRESS NOTES
Subjective:       Patient ID: Jay Schmid is a 29 y.o. female.    Chief Complaint:  Well Woman (Last normal pap was 2023. Pt had a tubal ligation.)        History of Present Illness  HPI  Annual Exam-Premenopausal  Patient presents for annual exam. The patient has no complaints today. The patient is sexually active. GYN screening history: last pap: approximate date 2023 and was normal. The patient wears seatbelts: yes. The patient participates in regular exercise: yes. Has the patient ever been transfused or tattooed?:  No/Yes . The patient reports that there is not domestic violence in her life.    Status post  delivery x 2 with tubal ligation.  History of PIH  Long history of type 1 diabetes mellitus. Brittle. Now doing well on Ozempic  Sickle cell trait       GYN & OB History  Patient's last menstrual period was 2024 (exact date).   Date of Last Pap: 2024    OB History    Para Term  AB Living   3 2 1 1 1 2   SAB IAB Ectopic Multiple Live Births     1   0 2      # Outcome Date GA Lbr Favian/2nd Weight Sex Type Anes PTL Lv   3  19 36w0d  2.73 kg (6 lb 0.3 oz) M CS-LTranv Spinal Y KARISSA      Complications: Hypertension affecting pregnancy in third trimester   2 Term 10/17/14 37w1d  2.999 kg (6 lb 9.8 oz) M CS-LTranv EPI  KARISSA   1 IAB 12             Past Medical History:   Diagnosis Date    Diabetes mellitus type I     Heart murmur     Sickle cell trait        Past Surgical History:   Procedure Laterality Date     SECTION       SECTION N/A 3/20/2019    Procedure:  SECTION;  Surgeon: Rudy Case MD;  Location: [facility] L&D OR;  Service: OB/GYN;  Laterality: N/A;    cyst removed on buttox  2011    DILATION AND CURETTAGE OF UTERUS  12    PILONIDAL CYST DRAINAGE  2014    TUBAL LIGATION         Family History   Problem Relation Name Age of Onset    Diabetes Paternal Grandmother      Hypertension Paternal Grandmother      Asthma  "Brother      Thyroid disease Mother      Congenital heart disease Son          hole in heart    Cardiomyopathy Neg Hx      Arrhythmia Neg Hx      Heart attacks under age 50 Neg Hx      Pacemaker/defibrilator Neg Hx         Social History     Socioeconomic History    Marital status: Single   Occupational History    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[employer]    Tobacco Use    Smoking status: Never    Smokeless tobacco: Never   Substance and Sexual Activity    Alcohol use: No    Drug use: No    Sexual activity: Yes     Partners: Male     Birth control/protection: None   Social History Narrative    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      BD ULTRA-FINE GABRIELE PEN NEEDLE 32 gauge x 5/32" Ndle USE TO INJECT QID  6    blood glucose strip-disp meter Kit Inject 1 Device into the skin.      blood sugar diagnostic Strp USE TO TEST BG TID.      blood-glucose meter Misc 1 Units by Other route.      HUMALOG KWIKPEN INSULIN 100 unit/mL pen INJECT 15 UNITS UNDER THE SKIN TID WITH PREMEAL DOSE CORRECTION- UP TO 55 UNITS DAILY      ibuprofen (ADVIL,MOTRIN) 800 MG tablet Take 1 tablet (800 mg total) by mouth every 8 (eight) hours as needed for Pain. 30 tablet 0    lancets 30 gauge Misc 3 (three) times daily      LANTUS SOLOSTAR U-100 INSULIN glargine 100 units/mL (3mL) SubQ pen       ondansetron (ZOFRAN-ODT) 4 MG TbDL Take 1 tablet (4 mg total) by mouth every 8 (eight) hours as needed. 20 tablet 0    ONETOUCH VERIO TEST STRIPS Strp 3 (three) times daily.      OZEMPIC 0.25 mg or 0.5 mg (2 mg/3 mL) pen injector Inject 0.5 mg into the skin every 7 days.      pen needle, diabetic 32 gauge x 5/32" Ndle USE TO INJECT FOUR TIMES DAILY      pen needle, diabetic 32 gauge x 5/32" Ndle 1 strip.       No current facility-administered medications for this visit.       Review of " patient's allergies indicates:   Allergen Reactions    Clindamycin Anaphylaxis and Hives       Review of Systems  Review of Systems   Constitutional:  Negative for activity change, appetite change, chills, fatigue, fever and unexpected weight change.   HENT:  Negative for mouth sores.    Respiratory:  Negative for cough, shortness of breath and wheezing.    Cardiovascular:  Negative for chest pain and palpitations.   Gastrointestinal:  Negative for abdominal pain, bloating, blood in stool, constipation, nausea and vomiting.   Endocrine: Negative for diabetes and hot flashes.   Genitourinary:  Positive for vaginal discharge and vaginal odor. Negative for dysmenorrhea, dyspareunia, dysuria, frequency, hematuria, hot flashes, menorrhagia, menstrual problem, pelvic pain, urgency, vaginal bleeding, vaginal pain, urinary incontinence and postcoital bleeding.        Frequent perineal abscess   Musculoskeletal:  Negative for back pain and myalgias.   Integumentary:  Negative for rash, breast mass and nipple discharge.   Neurological:  Negative for seizures and headaches.   Psychiatric/Behavioral:  Negative for depression and sleep disturbance. The patient is not nervous/anxious.    Breast: Negative for mass, mastodynia and nipple discharge          Objective:    Physical Exam:   Constitutional: She appears well-developed and well-nourished. No distress.   BMI of 36.17    HENT:   Head: Normocephalic and atraumatic.    Eyes: EOM are normal.      Pulmonary/Chest: Effort normal. No respiratory distress.   Breasts: Non-tender, no engorgement, no masses, no retraction, no discharge. Negative for lymphadenopathy.         Abdominal: Soft. She exhibits no distension and no abdominal incision. There is no abdominal tenderness. There is no rebound and no guarding.   Pfannenstiel scar       Genitourinary:    Vagina and uterus normal.   No vaginal discharge in the vagina.    Genitourinary Comments: Vulva without any obvious lesions.   Urethral meatus normal size and location without any lesion.  Urethra is non-tender without stricture or discharge.  Bladder is non-tender.  Vaginal vault with good support.  Minimal white discharge noted.  No obvious lesion.  Normal rugation.  Cervix is without any cervical motion tenderness.  No obvious lesion.  Uterus is small, non-tender, normal contour.  Adnexa is without any masses or tenderness.  Perineum with healing abscess on the left ischial area.  Minimal tenderness and induration.  No obvious erythema.               Musculoskeletal: Normal range of motion.       Neurological: She is alert.    Skin: Skin is warm and dry.    Psychiatric: She has a normal mood and affect.          Assessment:        1. Well woman exam with routine gynecological exam              Plan:          I have discussed with the patient her condition.  Monthly breast examination was instructed, discussed, and encouraged.  Patient was encouraged to consume a low-calorie, low fat diet, and to increase of physical activity.  Healthy habits encouraged.  A Pap smear was not performed according to the USPSTF recommendations.  Mammogram was not ordered because of the combination of her age and risk factors, according to ACOG guidelines.  Gonorrhea and Chlamydia testing performed;  HIV test offered, again according to guidelines.  Patient is to continue her medications as prescribed.    Care gaps addressed.  She needs to get her foot and eye exam  She will come back to see me in one year for her annual visit.  She can come back to see me sooner as necessary.  All of her questions were answered appropriately to her satisfaction.     She will continue to work with her PCP to better control of her diabetes.         ** A female chaperone, Machelle Cárdenas, was present for the pelvic exam

## 2024-12-19 LAB
C TRACH DNA SPEC QL NAA+PROBE: NOT DETECTED
N GONORRHOEA DNA SPEC QL NAA+PROBE: NOT DETECTED